# Patient Record
Sex: MALE | Race: WHITE | NOT HISPANIC OR LATINO | Employment: UNEMPLOYED | URBAN - METROPOLITAN AREA
[De-identification: names, ages, dates, MRNs, and addresses within clinical notes are randomized per-mention and may not be internally consistent; named-entity substitution may affect disease eponyms.]

---

## 2018-01-05 ENCOUNTER — COMMUNICATION - HEALTHEAST (OUTPATIENT)
Dept: SCHEDULING | Facility: CLINIC | Age: 33
End: 2018-01-05

## 2019-04-10 ENCOUNTER — OFFICE VISIT - HEALTHEAST (OUTPATIENT)
Dept: FAMILY MEDICINE | Facility: CLINIC | Age: 34
End: 2019-04-10

## 2019-04-10 DIAGNOSIS — I10 HYPERTENSION, UNSPECIFIED TYPE: ICD-10-CM

## 2019-04-10 DIAGNOSIS — Z01.818 PREOPERATIVE EXAMINATION: ICD-10-CM

## 2019-04-10 DIAGNOSIS — G47.33 OSA (OBSTRUCTIVE SLEEP APNEA): ICD-10-CM

## 2019-04-10 DIAGNOSIS — Z87.828 HISTORY OF LACERATION OF SKIN: ICD-10-CM

## 2019-04-10 LAB
ANION GAP SERPL CALCULATED.3IONS-SCNC: 10 MMOL/L (ref 5–18)
BUN SERPL-MCNC: 12 MG/DL (ref 8–22)
CALCIUM SERPL-MCNC: 9.9 MG/DL (ref 8.5–10.5)
CHLORIDE BLD-SCNC: 105 MMOL/L (ref 98–107)
CO2 SERPL-SCNC: 24 MMOL/L (ref 22–31)
CREAT SERPL-MCNC: 0.71 MG/DL (ref 0.7–1.3)
ERYTHROCYTE [DISTWIDTH] IN BLOOD BY AUTOMATED COUNT: 11 % (ref 11–14.5)
GFR SERPL CREATININE-BSD FRML MDRD: >60 ML/MIN/1.73M2
GLUCOSE BLD-MCNC: 91 MG/DL (ref 70–125)
HCT VFR BLD AUTO: 44.7 % (ref 40–54)
HGB BLD-MCNC: 14.8 G/DL (ref 14–18)
MCH RBC QN AUTO: 31.5 PG (ref 27–34)
MCHC RBC AUTO-ENTMCNC: 33.1 G/DL (ref 32–36)
MCV RBC AUTO: 95 FL (ref 80–100)
PLATELET # BLD AUTO: 293 THOU/UL (ref 140–440)
PMV BLD AUTO: 9.2 FL (ref 7–10)
POTASSIUM BLD-SCNC: 4.7 MMOL/L (ref 3.5–5)
RBC # BLD AUTO: 4.71 MILL/UL (ref 4.4–6.2)
SODIUM SERPL-SCNC: 139 MMOL/L (ref 136–145)
WBC: 7.4 THOU/UL (ref 4–11)

## 2019-04-11 ENCOUNTER — OFFICE VISIT - HEALTHEAST (OUTPATIENT)
Dept: SURGERY | Facility: CLINIC | Age: 34
End: 2019-04-11

## 2019-04-11 ENCOUNTER — ANESTHESIA - HEALTHEAST (OUTPATIENT)
Dept: SURGERY | Facility: CLINIC | Age: 34
End: 2019-04-11

## 2019-04-11 DIAGNOSIS — S31.802A: ICD-10-CM

## 2019-04-11 ASSESSMENT — MIFFLIN-ST. JEOR: SCORE: 2642.7

## 2019-04-12 ENCOUNTER — SURGERY - HEALTHEAST (OUTPATIENT)
Dept: SURGERY | Facility: CLINIC | Age: 34
End: 2019-04-12

## 2019-04-12 ASSESSMENT — MIFFLIN-ST. JEOR: SCORE: 2640.43

## 2019-04-18 ENCOUNTER — OFFICE VISIT - HEALTHEAST (OUTPATIENT)
Dept: SURGERY | Facility: CLINIC | Age: 34
End: 2019-04-18

## 2019-04-18 DIAGNOSIS — S31.811A LACERATION OF RIGHT BUTTOCK, INITIAL ENCOUNTER: ICD-10-CM

## 2019-06-05 ENCOUNTER — OFFICE VISIT - HEALTHEAST (OUTPATIENT)
Dept: FAMILY MEDICINE | Facility: CLINIC | Age: 34
End: 2019-06-05

## 2019-06-05 DIAGNOSIS — I15.9 SECONDARY HYPERTENSION: ICD-10-CM

## 2019-06-05 DIAGNOSIS — S31.811A LACERATION OF RIGHT BUTTOCK, INITIAL ENCOUNTER: ICD-10-CM

## 2020-01-21 ENCOUNTER — OFFICE VISIT - HEALTHEAST (OUTPATIENT)
Dept: FAMILY MEDICINE | Facility: CLINIC | Age: 35
End: 2020-01-21

## 2020-01-21 DIAGNOSIS — M25.562 ACUTE PAIN OF LEFT KNEE: ICD-10-CM

## 2020-03-16 ENCOUNTER — OFFICE VISIT - HEALTHEAST (OUTPATIENT)
Dept: FAMILY MEDICINE | Facility: CLINIC | Age: 35
End: 2020-03-16

## 2020-03-16 DIAGNOSIS — G47.33 OSA (OBSTRUCTIVE SLEEP APNEA): ICD-10-CM

## 2020-03-16 DIAGNOSIS — I15.9 SECONDARY HYPERTENSION: ICD-10-CM

## 2020-03-16 RX ORDER — HYDROCHLOROTHIAZIDE 25 MG/1
25 TABLET ORAL DAILY
Qty: 90 TABLET | Refills: 1 | Status: SHIPPED | OUTPATIENT
Start: 2020-03-16 | End: 2022-04-18

## 2020-07-31 ENCOUNTER — OFFICE VISIT - HEALTHEAST (OUTPATIENT)
Dept: FAMILY MEDICINE | Facility: CLINIC | Age: 35
End: 2020-07-31

## 2020-07-31 DIAGNOSIS — R31.0 GROSS HEMATURIA: ICD-10-CM

## 2020-07-31 DIAGNOSIS — R10.9 FLANK PAIN: ICD-10-CM

## 2020-07-31 DIAGNOSIS — I10 BENIGN ESSENTIAL HYPERTENSION: ICD-10-CM

## 2020-07-31 DIAGNOSIS — R00.0 TACHYCARDIA: ICD-10-CM

## 2020-07-31 DIAGNOSIS — R11.11 VOMITING WITHOUT NAUSEA, INTRACTABILITY OF VOMITING NOT SPECIFIED, UNSPECIFIED VOMITING TYPE: ICD-10-CM

## 2020-07-31 DIAGNOSIS — R35.0 URINE FREQUENCY: ICD-10-CM

## 2020-07-31 LAB
ALBUMIN UR-MCNC: ABNORMAL MG/DL
APPEARANCE UR: CLEAR
BILIRUB UR QL STRIP: NEGATIVE
COLOR UR AUTO: YELLOW
GLUCOSE UR STRIP-MCNC: NEGATIVE MG/DL
HGB UR QL STRIP: NEGATIVE
KETONES UR STRIP-MCNC: NEGATIVE MG/DL
LEUKOCYTE ESTERASE UR QL STRIP: NEGATIVE
NITRATE UR QL: NEGATIVE
PH UR STRIP: 7 [PH] (ref 5–8)
SP GR UR STRIP: 1.02 (ref 1–1.03)
UROBILINOGEN UR STRIP-ACNC: ABNORMAL

## 2020-07-31 ASSESSMENT — MIFFLIN-ST. JEOR: SCORE: 2547.45

## 2020-08-02 ENCOUNTER — COMMUNICATION - HEALTHEAST (OUTPATIENT)
Dept: SCHEDULING | Facility: CLINIC | Age: 35
End: 2020-08-02

## 2021-05-27 ENCOUNTER — RECORDS - HEALTHEAST (OUTPATIENT)
Dept: ADMINISTRATIVE | Facility: CLINIC | Age: 36
End: 2021-05-27

## 2021-05-27 NOTE — ANESTHESIA CARE TRANSFER NOTE
Last vitals:   Vitals:    04/12/19 0837   BP: (!) 197/105   Pulse: 100   Resp: 22   Temp:    SpO2: 99%     Patient's level of consciousness is drowsy  Spontaneous respirations: yes  Maintains airway independently: yes  Dentition unchanged: yes  Oropharynx: oropharynx clear of all foreign objects    QCDR Measures:  ASA# 20 - Surgical Safety Checklist: WHO surgical safety checklist completed prior to induction    PQRS# 430 - Adult PONV Prevention: 4558F - Pt received => 2 anti-emetic agents (different classes) preop & intraop  ASA# 8 - Peds PONV Prevention: NA - Not pediatric patient, not GA or 2 or more risk factors NOT present  PQRS# 424 - Nina-op Temp Management: 4559F - At least one body temp DOCUMENTED => 35.5C or 95.9F within required timeframe  PQRS# 426 - PACU Transfer Protocol: - Transfer of care checklist used  ASA# 14 - Acute Post-op Pain: ASA14B - Patient did NOT experience pain >= 7 out of 10

## 2021-05-27 NOTE — ANESTHESIA POSTPROCEDURE EVALUATION
Patient: Dk Akins  IRRIGATION AND DEBRIDEMENT, TORSO, PERIANAL WOUND  Anesthesia type: general    Patient location: PACU  Last vitals:   Vitals Value Taken Time   /77 4/12/2019 10:30 AM   Temp 36.9  C (98.4  F) 4/12/2019 10:40 AM   Pulse 82 4/12/2019 10:43 AM   Resp 18 4/12/2019 10:40 AM   SpO2 95 % 4/12/2019 10:43 AM   Vitals shown include unvalidated device data.  Post vital signs: stable  Level of consciousness: awake and responds to simple questions  Post-anesthesia pain: pain controlled  Post-anesthesia nausea and vomiting: no  Pulmonary: unassisted, return to baseline  Cardiovascular: stable and blood pressure at baseline  Hydration: adequate  Anesthetic events: no    QCDR Measures:  ASA# 11 - Nina-op Cardiac Arrest: ASA11B - Patient did NOT experience unanticipated cardiac arrest  ASA# 12 - Nina-op Mortality Rate: ASA12B - Patient did NOT die  ASA# 13 - PACU Re-Intubation Rate: ASA13B - Patient did NOT require a new airway mgmt  ASA# 10 - Composite Anes Safety: ASA10A - No serious adverse event    Additional Notes:

## 2021-05-27 NOTE — PROGRESS NOTES
Preoperative Exam    Scheduled Procedure: debridement R side buttocks  Surgery Date: Unspecified  Surgery Location: [facility]  Surgeon: Unspecified    33-year-old male with a past medical history of obstructive sleep apnea, and hypertension as well as obesity presents today for acute onset laceration.  He was seen in the emergency department this weekend after he accidentally sat on a betts's hook and sustained a laceration to his right buttocks.  He was sent to a different hospital ostensibly for insurance reasons and unfortunately showed up in this clinic requesting guidance.    He has a history of hypertension but for reasons that are not clear he is neglected to take his medication for the past month or so.  He has been on hydrochlorothiazide 25 mg daily.    The patient has had multiple past orthopedic surgeries without complication, he has high risk for sleep apnea, family history is negative for malignant hyperthermia, he is not on any blood thinners, and he is a Mallampati class III.    Assessment/Plan:     1. History of laceration of skin  Patient has a laceration which was inspected and can be repaired and debrided by general surgery.  Referral made.  - Ambulatory referral to General Surgery    2. Preoperative examination  Patient is been optimized for surgery  - HM2(CBC w/o Differential)  - Basic Metabolic Panel    3. LATOYA (obstructive sleep apnea)  Close monitoring postoperatively for sleep apnea    4. Hypertension, unspecified type  Patient's blood pressure elevated.  Unfortunately he has not been taking his antihypertensives and I encouraged him to get back on his hydrochlorothiazide.  Close monitoring of blood pressure pre-and postoperatively recommended.    Surgical Procedure Risk: Low (reported cardiac risk generally < 1%)  Have you had prior anesthesia?: Yes  Have you or any family members had a previous anesthesia reaction:  No  Do you or any family members have a history of a clotting or  bleeding disorder?: Yes: hx of DVT on shin  Cardiac Risk Assessment: no increased risk for major cardiac complications    Pt has been optimized for surgery      Functional Status: Independent  Patient plans to recover at home with family.     Subjective:      Dk Akins is a 33 y.o. male who presents for a preoperative consultation.      All other systems reviewed and are negative, other than those listed in the HPI.    Pertinent History  Do you have difficulty breathing or chest pain after walking up a flight of stairs: No  History of obstructive sleep apnea: Yes: undiagnosed  Steroid use in the last 6 months: No  Frequent Aspirin/NSAID use: No  Prior Blood Transfusion: No  Prior Blood Transfusion Reaction: No  If for some reason prior to, during or after the procedure, if it is medically indicated, would you be willing to have a blood transfusion?:  There is no transfusion refusal.    Current Outpatient Medications   Medication Sig Dispense Refill     amoxicillin-clavulanate (AUGMENTIN) 875-125 mg per tablet Take 1 tablet by mouth.       No current facility-administered medications for this visit.         No Known Allergies    Patient Active Problem List   Diagnosis     Closed Fracture Of The Patella     Bone Cyst       Past Medical History:   Diagnosis Date     Anxiety      Femur fracture (H)     x4       Past Surgical History:   Procedure Laterality Date     KNEE SURGERY       [place] OPEN TREATMENT BIMALLEOLAR ANKLE FRACTURE      Description: Open Treatment Of Bimalleolar Ankle Fracture;  Recorded: 09/27/2012;       Social History     Socioeconomic History     Marital status: Single     Spouse name: Not on file     Number of children: Not on file     Years of education: Not on file     Highest education level: Not on file   Occupational History     Not on file   Social Needs     Financial resource strain: Not on file     Food insecurity:     Worry: Not on file     Inability: Not on file     Transportation  needs:     Medical: Not on file     Non-medical: Not on file   Tobacco Use     Smoking status: Light Tobacco Smoker     Smokeless tobacco: Never Used   Substance and Sexual Activity     Alcohol use: Not on file     Drug use: Not on file     Sexual activity: Not on file   Lifestyle     Physical activity:     Days per week: Not on file     Minutes per session: Not on file     Stress: Not on file   Relationships     Social connections:     Talks on phone: Not on file     Gets together: Not on file     Attends Voodoo service: Not on file     Active member of club or organization: Not on file     Attends meetings of clubs or organizations: Not on file     Relationship status: Not on file     Intimate partner violence:     Fear of current or ex partner: Not on file     Emotionally abused: Not on file     Physically abused: Not on file     Forced sexual activity: Not on file   Other Topics Concern     Not on file   Social History Narrative     Not on file       ROS:  10 pt system review complete.  Notable for hypertension obesity              Objective:     Vitals:    04/10/19 1352   BP: (!) 160/100   Pulse: 95   Resp: 16   SpO2: 97%   Weight: (!) 372 lb (168.7 kg)         Physical Exam:  Constitutional:    --Vitals as above  --No acute distress  Eyes-  --Sclera noninjected  --Lids and conjunctiva normal  ENT-  --TMs clear  --Sclera noninjected  --Pharynx not erythematous  Neck-  --Neck supple with no cervical lymphadenopathy  Lungs-  --Clear to Auscultation  Heart-  --Regular rate and rhythm  Abdomen--  --Soft, non-tender, non-distended but obese  Skin-  --Pink and dry except for the right buttock has a approximately 10 cm laceration on the medial aspect of the right buttock  Psych-  --Alert and oriented  --Normal affect      There are no Patient Instructions on file for this visit.        Labs:  Labs pending at this time.  Results will be reviewed when available.    Immunization History   Administered Date(s)  Administered     DT (pediatric) 01/01/1999     Hep B, historic 01/21/1999, 03/22/1999     Influenza, seasonal,quad inj 36+ mos 09/30/2016     Td,adult,historic,unspecified 01/01/1999     Tdap 02/07/2012       Thank you for the opportunity to participate in the care for this patient.  If you have any concerns please do not hesitate to contact me at the [facility] at [phone].    Electronically signed by Usha Garcia MD 04/10/19 1:54 PM

## 2021-05-27 NOTE — ANESTHESIA PREPROCEDURE EVALUATION
Anesthesia Evaluation      Patient summary reviewed     Airway   Mallampati: III  Neck ROM: limited  Comment: Very large neck diameter and beard   Pulmonary - normal exam                          Cardiovascular   Exercise tolerance: > or = 4 METS  (+) hypertension, ,     Rhythm: regular  Rate: normal,         Neuro/Psych    (+) depression, anxiety/panic attacks,     Endo/Other    (+) obesity (BMI 49.7),      GI/Hepatic/Renal       Other findings: Obese habitus  eyeglasses      Dental - normal exam                        Anesthesia Plan  Planned anesthetic: general endotracheal  Gildescope, discussed the possibility of awake fiberoptic intubation  Patient likely has undiagnosed sleep apnea.  Will monitor accordingly  ASA 3   Induction: intravenous   Anesthetic plan and risks discussed with: patient and spouse  Anesthesia plan special considerations: antiemetics,   Post-op plan: routine recovery

## 2021-05-27 NOTE — PROGRESS NOTES
HPI: Dk Akins is a 33 y.o. male referred to see me by Usha Garcia MD for duration of a perianal laceration..  He notes sustaining a laceration to his right buttock 4 days ago, when he accidentally sat on a betts's hook.  He initially presented to urgent care, where after inspection he was referred to our colleagues at [facility] were unable to help him due to insurance reasons.  He was then seen by Dr. Garcia yesterday, who felt this wound needed debridement and refer him on to us.  Notes still having a fair amount of pain, no difficulty with defecation, no incontinence.    Allergies:Patient has no known allergies.    Past Medical History:   Diagnosis Date     Anxiety      Femur fracture (H)     x4       Past Surgical History:   Procedure Laterality Date     KNEE SURGERY       [place] OPEN TREATMENT BIMALLEOLAR ANKLE FRACTURE      Description: Open Treatment Of Bimalleolar Ankle Fracture;  Recorded: 09/27/2012;       CURRENT MEDS:    Current Outpatient Medications:      amoxicillin-clavulanate (AUGMENTIN) 875-125 mg per tablet, Take 1 tablet by mouth., Disp: , Rfl:      hydroCHLOROthiazide (HYDRODIURIL) 25 MG tablet, Take 25 mg by mouth daily., Disp: , Rfl:     Family History   Problem Relation Age of Onset     Mental retardation Brother      Breast cancer Maternal Grandmother      Coronary artery disease Paternal Grandfather         reports that he has been smoking.  He has never used smokeless tobacco.    Review of Systems:  The 10 point review of systems  is within normal limits except for as mentioned above in the HPI.  General ROS: No complaints or constitutional symptoms  Skin: As noted in HPI  Hematologic/Lymphatic: No symptoms or complaints  Psychiatric: No symptoms or complaints  Endocrine: No excessive fatigue, no hypermetabolic symptoms reported  Respiratory ROS: no cough, shortness of breath, or wheezing  Cardiovascular ROS: no chest pain or dyspnea on exertion  Gastrointestinal  ROS: GI complaints or concerns  Musculoskeletal ROS: no recent injuries reported  Neurological ROS: no focal neurologic defects reported.        BP (!) 140/94 (Patient Site: Right Arm, Patient Position: Sitting, Cuff Size: Adult Large)   Pulse (!) 107   Ht 6' (1.829 m)   Wt (!) 367 lb (166.5 kg)   SpO2 97%   BMI 49.77 kg/m    Body mass index is 49.77 kg/m .    EXAM:  General : Alert, cooperative, appears stated age   Skin: 8-10 cm laceration at the 3 o'clock position examined the patient in prone jackknife, with some fibrinous exudate and dead skin tissue at the medial aspect.  No purulence noted.  Lymphatic: No obvious adenopathy, no swelling   Eyes: No scleral icterus, pupils equal  HENT: no traumatic injury to the head or face, no gross abnormalities  Lungs: Normal respiratory effort  Heart: Regular rate and rhythm  Musculoskeletal: No obvious swelling  Neurologic: Grossly intact      LABS:  Lab Results   Component Value Date    WBC 7.4 04/10/2019    HGB 14.8 04/10/2019    HCT 44.7 04/10/2019    MCV 95 04/10/2019     04/10/2019     INR/Prothrombin Time  Results from last 7 days   Lab Units 04/10/19  1428   LN-SODIUM mmol/L 139   LN-POTASSIUM mmol/L 4.7   LN-CHLORIDE mmol/L 105   LN-CO2 mmol/L 24   LN-BLOOD UREA NITROGEN mg/dL 12   LN-CREATININE mg/dL 0.71   LN-CALCIUM mg/dL 9.9     Lab Results   Component Value Date    ALT 56 04/04/2011    AST 23 04/04/2011    ALKPHOS 60 04/04/2011    BILITOT 1.23 (H) 04/04/2011     Assessment/Plan:   1. Laceration of buttock with foreign body, unspecified laterality, initial encounter        Dk Akins is a 33 y.o. male with a laceration now 4 days old of the skin involving the perianal tissue, but not involving the sphincter complex.  We will plan on performing debridement under monitored anesthetic care tomorrow.  Discussed that we will not be primarily closing the wound due to bacterial colonization in the area, but will try to reapproximate it to allow for  quicker healing.    Maxi Rolle MD  [phone]  [facility] Department of Surgery

## 2021-05-29 NOTE — PROGRESS NOTES
Chief Complaint   Patient presents with     Wound Check     Pt c/o wound from April has reopened       HPI: 33-year-old male presents today with 2 concerns.  The first is a right buttock wound.  He sat on a betts's hook approximately 1 month ago, had surgery, and was using wet-to-dry dressings and noted that the wound was completely healed.  In fact, he showed me a photo of the wound being completely healed and closed.  Unfortunately he was fishing over the weekend, and was leaning against a fishing seat and opened up the wound.  Since then has been having clear drainage.  He has had no bowel movement issues.  No blood.    He was also concerned about hypertension.  He is currently on hydrochlorothiazide from another provider, and his blood pressure has been elevated in the past.    ROS: No chest pain or shortness of breath.  No blood from the wound.  No bowel or bladder difficulties.    SH:    reports that he has been smoking.  He has never used smokeless tobacco. He reports that he drinks about 1.2 oz of alcohol per week.      FH: The Patient's family history includes Breast cancer in his maternal grandmother; Coronary artery disease in his paternal grandfather; Mental retardation in his brother.     Meds:  Dk has a current medication list which includes the following prescription(s): docusate sodium, hydrochlorothiazide, and ibuprofen.    O:  /80   Pulse 76   Temp 99  F (37.2  C)   Resp 16   Wt (!) 373 lb (169.2 kg)   SpO2 96%   BMI 50.59 kg/m    Examination of the buttocks show a 1 x 3 cm oval laceration in the right gluteal region medial aspect that has epithelization underneath with open wound edges of approximately 1 cm.    A/P:   1. Laceration of right buttock, initial encounter  The wound appears to be clean, and no evidence of infection.  I think that allowing it to heal from within as opposed to closure is the appropriate choice as was the choice of surgery.  Encourage wet-to-dry  dressings and follow-up in 4 weeks.  Wife is very attentive and aware of wound dressing techniques.  He will let me know if wound gets worse in any way.    2. Secondary hypertension  Blood pressure elevated and I encouraged him to continue the hydrochlorothiazide.  Encouraged him to give me serial blood pressures and if blood pressures are elevated at his next visit, in 4 weeks, would consider further medications.  Encouraged exercise weight loss and healthy diet.

## 2021-06-02 ENCOUNTER — RECORDS - HEALTHEAST (OUTPATIENT)
Dept: ADMINISTRATIVE | Facility: CLINIC | Age: 36
End: 2021-06-02

## 2021-06-03 VITALS — WEIGHT: 315 LBS | BODY MASS INDEX: 48.96 KG/M2

## 2021-06-03 VITALS — WEIGHT: 315 LBS | BODY MASS INDEX: 50.59 KG/M2

## 2021-06-03 VITALS — BODY MASS INDEX: 42.66 KG/M2 | HEIGHT: 72 IN | WEIGHT: 315 LBS

## 2021-06-03 VITALS — BODY MASS INDEX: 50.45 KG/M2 | WEIGHT: 315 LBS

## 2021-06-04 VITALS
SYSTOLIC BLOOD PRESSURE: 150 MMHG | RESPIRATION RATE: 16 BRPM | HEART RATE: 102 BPM | BODY MASS INDEX: 49.37 KG/M2 | DIASTOLIC BLOOD PRESSURE: 90 MMHG | OXYGEN SATURATION: 96 % | WEIGHT: 315 LBS

## 2021-06-04 VITALS
DIASTOLIC BLOOD PRESSURE: 106 MMHG | SYSTOLIC BLOOD PRESSURE: 160 MMHG | WEIGHT: 315 LBS | BODY MASS INDEX: 42.66 KG/M2 | HEART RATE: 125 BPM | OXYGEN SATURATION: 97 % | HEIGHT: 72 IN

## 2021-06-04 VITALS
WEIGHT: 315 LBS | OXYGEN SATURATION: 97 % | SYSTOLIC BLOOD PRESSURE: 158 MMHG | DIASTOLIC BLOOD PRESSURE: 90 MMHG | BODY MASS INDEX: 47.88 KG/M2 | RESPIRATION RATE: 16 BRPM | HEART RATE: 97 BPM

## 2021-06-05 NOTE — PROGRESS NOTES
Chief Complaint   Patient presents with     Knee Pain     Pt c/o slipping on the ice and hurt his L knee 2 weeks ago. He also pulled off his boots and fell landed on L knee.        HPI: Complicated 34-year-old male with multiple past orthopedic injuries including fractured patella, wound dehiscence and hypertension, presents today with left knee pain for 1 month in duration.  He notes that he slipped on the ice and had a pivoting motion which caused pain.  He cannot tell me whether not it was swollen.  Now hurts him to ambulate particularly up and down stairs.    ROS: No distal paresthesias.  No swelling.    SH:    reports that he has been smoking. He has never used smokeless tobacco. He reports current alcohol use of about 2.0 standard drinks of alcohol per week.      FH: The Patient's family history includes Breast cancer in his maternal grandmother; Coronary artery disease in his paternal grandfather; Mental retardation in his brother.     Meds: Dk has a current medication list which includes the following prescription(s): docusate sodium, hydrochlorothiazide, and ibuprofen.    O:  /90   Pulse 97   Resp 16   Wt (!) 353 lb (160.1 kg)   SpO2 97%   BMI 47.88 kg/m       Constitutional:    --Vitals as above  --No acute distress  Eyes-  --Sclera noninjected  --Lids and conjunctiva normal  Musculoskeletal-left knee is examined and shows patella freely movable, there is no mediolateral or posterior joint space tenderness, normal flexion extension, positive Josue sign, negative Lockman sign.  Skin-  --Finderne and dry    A/P:   1. Acute pain of left knee  Most likely represents meniscal tear.  Offered MRI and imaging.  Patient will be referred to orthopedics since he has had a relationship with some orthopedics in the past.  - Ambulatory referral to Orthopedics

## 2021-06-06 NOTE — PROGRESS NOTES
Chief Complaint   Patient presents with     Snoring     Pt would like referral to sleep clinic       HPI: 34-year-old male with history of obesity, hypertension presents today with concerns about sleep apnea.  His wife tells him that he snores, and that is the only evidence that he has.  He sleeps well, feels rested and has no concerns personally.    He continues to have hypertension has not taken his hydrochlorothiazide.    His knee is improving after surgery.    ROS: No sleepiness.  No falling asleep in the middle of the day.  No difficulty breathing at night.    SH: Reviewed-see Snapshot for review.     FH: Reviewed-see Snapshot for review.    Meds:  Dk has a current medication list which includes the following prescription(s): docusate sodium, hydrochlorothiazide, and ibuprofen.    O:  /90   Pulse (!) 102   Resp 16   Wt (!) 364 lb (165.1 kg)   SpO2 96%   BMI 49.37 kg/m    Constitutional:    --Vitals as above  --No acute distress  Eyes-  --Sclera noninjected  --Lids and conjunctiva normal  --Pink and dry    A/P:   1. LATOYA (obstructive sleep apnea)  The patient has no symptoms of sleep apnea but is told he snores.  Will refer to sleep medicine.  - Ambulatory referral to Sleep Medicine    2. Secondary hypertension  Restart hydrochlorothiazide monitor blood pressure  - hydroCHLOROthiazide (HYDRODIURIL) 25 MG tablet; Take 1 tablet (25 mg total) by mouth daily.  Dispense: 90 tablet; Refill: 1    3.  Obesity  -Patient is aware he needs to lose weight.

## 2021-06-10 NOTE — PROGRESS NOTES
Chief Complaint   Patient presents with     Back Pain     Started off with low left side now last 2 days has been low right side. Pain wraps around to the front.      Urine Frequency     States that he has had increased frequency but still able to go.        HPI: Patient presents today with 2 days of worsening right flank pain and also severe left lower back pain as well.  This is in the presence of urinary frequency.  When asked about hematuria, the patient says that last night he cannot remember if he directed or if it actually happened, but he has a foggy vision of him having a significant amount of blood in the toilet bowl.  No dysuria.  He has been vomiting the pain is so bad.  He intermittently will feel hot and cold.  No constipation or diarrhea but he admits the pain gets so bad at times that he will vomit.  No personal history of renal colic but he thinks his dad might of had a kidney stone.  Specific concerns for STDs.    Blood pressure today is elevated.  Patient says that this is nothing new.  No chest pain palpitations, lightheadedness, dizziness.      ROS:Review of Systems - negative except for what's listed in the HPI    SH: The Patient's  reports that he has been smoking. He has never used smokeless tobacco. He reports current alcohol use of about 2.0 standard drinks of alcohol per week.      FH: The Patient's family history includes Breast cancer in his maternal grandmother; Coronary artery disease in his paternal grandfather; Mental retardation in his brother.     Meds:    Current Outpatient Medications on File Prior to Visit   Medication Sig Dispense Refill     docusate sodium (COLACE) 100 MG capsule Take 100 mg by mouth daily.       hydroCHLOROthiazide (HYDRODIURIL) 25 MG tablet Take 1 tablet (25 mg total) by mouth daily. 90 tablet 1     ibuprofen (ADVIL,MOTRIN) 200 MG tablet Take 800 mg by mouth every 6 (six) hours as needed for pain.       No current facility-administered medications on file  prior to visit.        O:  BP (!) 160/106   Pulse (!) 125   Ht 6' (1.829 m)   Wt (!) 346 lb (156.9 kg)   SpO2 97%   BMI 46.93 kg/m      Physical Examination:   General appearance -alert.  Appears in significant distress.  Hunched over.  He has vomited into a trash can.  Mental status - alert, oriented to person, place, and time  Mouth - mucous membranes moist  Neck - no significant adenopathy  Lymphatics - no palpable lymphadenopathy, no hepatosplenomegaly  Chest - clear to auscultation, no wheezes, rales or rhonchi, symmetric air entry  Heart - normal rate and regular rhythm, S1 and S2 normal, no murmurs noted  Abdomen -mild discomfort with palpation along the right and left abdomen.  Musculoskeletal-hunched over.  Slow sit to stand.  No significant CVA tenderness.  Extremities peripheral pulses normal  Skin - normal coloration and turgor.      A/P:     Problem List Items Addressed This Visit     None      Visit Diagnoses     Flank pain    -  Primary    Urine frequency        Relevant Orders    Urinalysis Macroscopic (Completed)    Gross hematuria        Tachycardia        Vomiting without nausea, intractability of vomiting not specified, unspecified vomiting type        Benign essential hypertension            Patient appears extremely uncomfortable.  Given the vital signs and potential for infectious origin, we have to think of sepsis as well.  Likelihood of kidney stone is high with possible hydronephrosis.  Urine is surprisingly normal.  Potential also for back issues.  Discussed with the patient that we should get evaluation in the emergency department but he declines.  He would like to try to get a CT scan done outpatient.  This needs to be done stat.    Addendum:    Reentering the room with the ketorolac injection for the patient, he has changed his mind and is willing to go to the emergency department.  Report called to [facility] ED.    1. Flank pain    2. Urine frequency  - Urinalysis  Macroscopic    3. Gross hematuria    4. Tachycardia    5. Vomiting without nausea, intractability of vomiting not specified, unspecified vomiting type    6. Benign essential hypertension        Kevin Parada, CNP

## 2021-06-10 NOTE — PATIENT INSTRUCTIONS - HE
Toradol injection given. No advil or ibuprofen for the next 24 hours.    CT to rule out a stone.    I'll call your cell phone when the results are read by radiology and will leave a message if I cant reach you.    If the pain is too uncontrolled, get checked out in the ER..

## 2021-06-16 PROBLEM — T81.33XA TRAUMATIC WOUND DEHISCENCE: Status: ACTIVE | Noted: 2019-04-11

## 2021-06-19 NOTE — LETTER
Letter by Maxi Rolle MD at      Author: Maxi Rolle MD Service: -- Author Type: --    Filed:  Encounter Date: 4/18/2019 Status: (Other)         April 18, 2019     Patient: Dk Akins   YOB: 1985   Date of Visit: 4/18/2019       To Whom It May Concern:    It is my medical opinion that Dk Akins should remain out of work until 4/29/2019 due to an injury resulting in an open wound.  Due to its location, frequent physical activity poses a risk to its healing.  .    If you have any questions or concerns, please don't hesitate to call.    Sincerely,        Electronically signed by Maxi Rolle MD

## 2021-06-27 ENCOUNTER — HEALTH MAINTENANCE LETTER (OUTPATIENT)
Age: 36
End: 2021-06-27

## 2021-10-17 ENCOUNTER — HEALTH MAINTENANCE LETTER (OUTPATIENT)
Age: 36
End: 2021-10-17

## 2022-04-18 ENCOUNTER — OFFICE VISIT (OUTPATIENT)
Dept: FAMILY MEDICINE | Facility: CLINIC | Age: 37
End: 2022-04-18
Payer: COMMERCIAL

## 2022-04-18 VITALS
HEART RATE: 88 BPM | DIASTOLIC BLOOD PRESSURE: 82 MMHG | OXYGEN SATURATION: 97 % | SYSTOLIC BLOOD PRESSURE: 128 MMHG | BODY MASS INDEX: 42.04 KG/M2 | WEIGHT: 310 LBS

## 2022-04-18 DIAGNOSIS — E66.01 MORBID OBESITY (H): ICD-10-CM

## 2022-04-18 DIAGNOSIS — Z09 HOSPITAL DISCHARGE FOLLOW-UP: Primary | ICD-10-CM

## 2022-04-18 DIAGNOSIS — Z91.89 AT RISK FOR NARCOLEPSY: ICD-10-CM

## 2022-04-18 DIAGNOSIS — I15.9 SECONDARY HYPERTENSION: ICD-10-CM

## 2022-04-18 DIAGNOSIS — G47.33 OSA (OBSTRUCTIVE SLEEP APNEA): ICD-10-CM

## 2022-04-18 PROCEDURE — 99214 OFFICE O/P EST MOD 30 MIN: CPT | Performed by: FAMILY MEDICINE

## 2022-04-18 RX ORDER — HYDROCHLOROTHIAZIDE 25 MG/1
25 TABLET ORAL
COMMUNITY
Start: 2020-03-16 | End: 2022-05-02 | Stop reason: DRUGHIGH

## 2022-04-18 NOTE — PROGRESS NOTES
ANTICOAGULATION FOLLOW-UP CLINIC VISIT    Patient Name:  Angel Garrett  Date:  2021  Contact Type:  Face to Face    SUBJECTIVE:  Patient Findings         Clinical Outcomes     Negatives:  Major bleeding event, Thromboembolic event, Anticoagulation-related hospital admission, Anticoagulation-related ED visit, Anticoagulation-related fatality           OBJECTIVE    Recent labs: (last 7 days)     21  1515   INR 2.9*       ASSESSMENT / PLAN  INR assessment THER    Recheck INR In: 6 WEEKS    INR Location Clinic      Anticoagulation Summary  As of 2021    INR goal:  2.0-3.0   TTR:  86.1 % (10.1 mo)   INR used for dosin.9 (2021)   Warfarin maintenance plan:  9 mg (3 mg x 3) every Sat; 6 mg (3 mg x 2) all other days   Full warfarin instructions:  9 mg every Sat; 6 mg all other days   Weekly warfarin total:  45 mg   No change documented:  Taniya Christiansen RN   Plan last modified:  Saba Honeycutt RN (2020)   Next INR check:  2021   Priority:  Maintenance   Target end date:  Indefinite    Indications    Chronic atrial fibrillation (H) [I48.20]  Anticoagulation monitoring  INR range 2-3 [Z79.01]             Anticoagulation Episode Summary     INR check location:  Anticoagulation Clinic    Preferred lab:  [facility]    Send INR reminders to:  [facility]    Comments:        Anticoagulation Care Providers     Provider Role Specialty Phone number    Donte Mckeon MD Referring Family Medicine [phone]            See the Encounter Report to view Anticoagulation Flowsheet and Dosing Calendar (Go to Encounters tab in chart review, and find the Anticoagulation Therapy Visit)        Taniya Christiansen RN                  Assessment/plan   Dk Akins is a 36 year old male who is New  patient to my practice here with chief complaint     Patient presents with:  Establish Care: Check for sleep Apnea and follow up blood pressure        Dk was seen today for establish care.    Diagnoses and all orders for this visit:    Hospital discharge follow-up  Comments:  STOP-BANG score 6-7. CPAP/sleep test. Will follow up.    Secondary hypertension  Comments:  Currently controlled with hydrochlorothiazide.    LATOYA (obstructive sleep apnea)  Comments:  STOP-BANG score 6-7. CPAP/sleep test. Will follow up.  Orders:  -     Adult Sleep Eval & Management  Referral; Future    Morbid obesity (H)  Comments:  STOP-BANG score 6-7. CPAP/sleep test. Will follow up.    At risk for narcolepsy  Comments:  STOP-BANG score 6-7. CPAP/sleep test. Will follow up.      He will also like to establish care with us advised to make appointment in next 2 to 3 weeks for preventive visit  No fasting labs done today    Subjective:      HPI: Dk Akins, a group-home resident, is a 36 year old male here for hospital followup visit. He went to the urgent care and ER 8 days ago with a chief complaint of confusion with increased nausea, vomiting, diarrhea, and warm feeling over the upper body including the face over the past week. This might have been a stomach flu. It might have caused dehydration as reflected in his lab and decompensation in the context of severe sleep deprivation from LATOYA +/- narcolepsy.    HISTORY: Last Saturday, he came home looking confused but reports he remembered everything up to that point. He drove to the gas station shortly after he came home and apparently fell asleep. However, he doesn't remember spending time with his daughter and how he drove to the gas station or how he fell asleep. He was found by his concerned wife as it was the gas station he usually goes to.     He admits to drinking alcohol but the last drink was  over 3 weeks ago. Has had an eye exam done in the last 6 months. He would like to establish care at this clinic. Recommended scheduling a preventive visit at his convenience in the next two weeks or so after completing the sleep study. Scored 6-7 points on STOP-BANG.    Hospital Follow-up Visit:    Hospital/Nursing Home/ Rehab Facility: [facility]  Date of Admission: 4/10/22  Date of Discharge: 4/10/22  Reason(s) for Admission: Confusion, Found at the gas station sleeping      Was your hospitalization related to COVID-19? No   Problems taking medications regularly:  None  Medication changes since discharge: Hydrochlorothiazide 25m  Problems adhering to non-medication therapy:  None    Summary of hospitalization:  [facility] discharge summary reviewed  Diagnostic Tests/Treatments reviewed.  Follow up needed: Sleep test  Other Healthcare Providers Involved in Patient s Care:         None  Update since discharge: improved. Post Discharge Medication Reconciliation: discharge medications reconciled, continue medications without change.  Plan of care communicated with patient            I have personally reviewed the patient's allergies, medications, past medical history, family history, social history, rooming notes and problem list in detail and updated the patient record as necessary.      Past Medical History:   Diagnosis Date     Anxiety      Anxiety      Femur fracture (H)     x4     Femur fracture (H)     x4     Humerus fracture 2000     Humerus fracture 2000     Hypertension      Hypertension      Past Surgical History:   Procedure Laterality Date     HC OPEN TX BIMALLEOLAR ANKLE FX, INCL INTERNAL FIXATION      Description: Open Treatment Of Bimalleolar Ankle Fracture;  Recorded: 09/27/2012;     KNEE SURGERY       Patient has no known allergies.  Current Outpatient Medications   Medication Sig Dispense Refill     hydrochlorothiazide (HYDRODIURIL) 25 MG tablet Take 25 mg by mouth       Family  History   Problem Relation Age of Onset     Mental retardation Brother      Breast Cancer Maternal Grandmother      Coronary Artery Disease Paternal Grandfather        Patient Active Problem List   Diagnosis     Closed Fracture Of The Patella     Bone cyst     Traumatic wound dehiscence     Laceration of right buttock, initial encounter     Secondary hypertension     Elevated blood pressure     Morbid obesity (H)       Review of Systems  Skin: negative, rash, bruising  Eyes: negative, visual blurring, double vision  Ears/Nose/Throat: negative, tinnitus, vertigo  Respiratory: No shortness of breath, dyspnea on exertion, cough, or hemoptysis  Cardiovascular: negative, exertional chest pain or pressure, paroxysmal nocturnal dyspnea, orthopnea and lower extremity edema  Gastrointestinal: negative, nausea, vomiting, melena, hematochezia, constipation and diarrhea  Genitourinary: negative, dysuria, frequency, urgency and hesitancy  Musculoskeletal: negative, joint pain and joint swelling  Neurologic: negative, numbness or tingling of hands and numbness or tingling of feet  Psychiatric: negative, excessive alcohol consumption and illegal drug usage  Hematologic/Lymphatic/Immunologic: negative, chills and fever  Endocrine: negative, cold intolerance and heat intolerance    Social History     Social History Narrative     Not on file       Objective:     Vitals:    04/18/22 1106   BP: 128/82   Pulse: 88   SpO2: 97%   Weight: 140.6 kg (310 lb)       Physical Exam:   Physical Exam:  General Appearance:  Appears comfortable, cooperative, tired/sleepy, no distress  Head: Normocephalic, without obvious abnormality, atraumatic  Eyes: PERRL, conjunctiva/corneas clear, EOM's intact, both eyes             Nose: Nares normal, no drainage   Throat: Lips, mucosa, and tongue normal; teeth and gums normal  Neck: Supple, symmetrical, trachea midline, no adenopathy;                      Lungs: Clear to auscultation bilaterally, respirations  unlabored  Heart: Regular rate and rhythm, S1 and S2 normal, no murmur, rubs or gallop  Abdomen: soft, symmetric, BS normal in 4 quadrants. No guarding or rebound tenderness  Extremities: Extremities normal, atraumatic, no cyanosis or edema  Pulses: DP pulses are 1-2+ bilat.  Skin: no rashes or lesions        25 minutes spent on the day of encounter doing chart review, history and exam, documentation, and further activities as noted.     This note has been dictated using voice recognition software. Any grammatical or context distortions are unintentional and inherent to the software    Bridgette Peace MD     There are no Patient Instructions on file for this visit.

## 2022-05-02 ENCOUNTER — OFFICE VISIT (OUTPATIENT)
Dept: FAMILY MEDICINE | Facility: CLINIC | Age: 37
End: 2022-05-02
Payer: COMMERCIAL

## 2022-05-02 VITALS
SYSTOLIC BLOOD PRESSURE: 148 MMHG | HEART RATE: 84 BPM | WEIGHT: 315 LBS | BODY MASS INDEX: 49.68 KG/M2 | DIASTOLIC BLOOD PRESSURE: 94 MMHG

## 2022-05-02 DIAGNOSIS — I15.9 SECONDARY HYPERTENSION: Primary | ICD-10-CM

## 2022-05-02 DIAGNOSIS — G47.33 OSA (OBSTRUCTIVE SLEEP APNEA): ICD-10-CM

## 2022-05-02 DIAGNOSIS — Z13.228 SCREENING FOR METABOLIC DISORDER: ICD-10-CM

## 2022-05-02 DIAGNOSIS — E66.01 MORBID OBESITY (H): ICD-10-CM

## 2022-05-02 DIAGNOSIS — Z11.59 ENCOUNTER FOR HEPATITIS C SCREENING TEST FOR LOW RISK PATIENT: ICD-10-CM

## 2022-05-02 LAB
ANION GAP SERPL CALCULATED.3IONS-SCNC: 15 MMOL/L (ref 5–18)
BUN SERPL-MCNC: 15 MG/DL (ref 8–22)
CALCIUM SERPL-MCNC: 9.9 MG/DL (ref 8.5–10.5)
CHLORIDE BLD-SCNC: 99 MMOL/L (ref 98–107)
CHOLEST SERPL-MCNC: 221 MG/DL
CO2 SERPL-SCNC: 26 MMOL/L (ref 22–31)
CREAT SERPL-MCNC: 0.8 MG/DL (ref 0.7–1.3)
FASTING STATUS PATIENT QL REPORTED: NO
GFR SERPL CREATININE-BSD FRML MDRD: >90 ML/MIN/1.73M2
GLUCOSE BLD-MCNC: 101 MG/DL (ref 70–125)
HBA1C MFR BLD: 5.3 % (ref 0–5.6)
HDLC SERPL-MCNC: 37 MG/DL
LDLC SERPL CALC-MCNC: 124 MG/DL
POTASSIUM BLD-SCNC: 4.2 MMOL/L (ref 3.5–5)
SODIUM SERPL-SCNC: 140 MMOL/L (ref 136–145)
TRIGL SERPL-MCNC: 301 MG/DL

## 2022-05-02 PROCEDURE — 80048 BASIC METABOLIC PNL TOTAL CA: CPT | Performed by: FAMILY MEDICINE

## 2022-05-02 PROCEDURE — 0054A COVID-19,PF,PFIZER (12+ YRS): CPT | Performed by: FAMILY MEDICINE

## 2022-05-02 PROCEDURE — 91305 COVID-19,PF,PFIZER (12+ YRS): CPT | Performed by: FAMILY MEDICINE

## 2022-05-02 PROCEDURE — 36415 COLL VENOUS BLD VENIPUNCTURE: CPT | Performed by: FAMILY MEDICINE

## 2022-05-02 PROCEDURE — 90715 TDAP VACCINE 7 YRS/> IM: CPT | Performed by: FAMILY MEDICINE

## 2022-05-02 PROCEDURE — 99214 OFFICE O/P EST MOD 30 MIN: CPT | Mod: 25 | Performed by: FAMILY MEDICINE

## 2022-05-02 PROCEDURE — 83036 HEMOGLOBIN GLYCOSYLATED A1C: CPT | Performed by: FAMILY MEDICINE

## 2022-05-02 PROCEDURE — 80061 LIPID PANEL: CPT | Performed by: FAMILY MEDICINE

## 2022-05-02 PROCEDURE — 86803 HEPATITIS C AB TEST: CPT | Performed by: FAMILY MEDICINE

## 2022-05-02 PROCEDURE — 90471 IMMUNIZATION ADMIN: CPT | Performed by: FAMILY MEDICINE

## 2022-05-02 RX ORDER — ATENOLOL AND CHLORTHALIDONE TABLET 50; 25 MG/1; MG/1
1 TABLET ORAL DAILY
Qty: 90 TABLET | Refills: 1 | Status: SHIPPED | OUTPATIENT
Start: 2022-05-02 | End: 2023-05-10

## 2022-05-02 NOTE — PROGRESS NOTES
Assessment/plan   Dk Akins is a 36 year old male who is  establish patient to my practice here with chief complaint     Patient presents with:  RECHECK: Follow up regarding the confusion and blood pressure, no new concerns, confusion gotten better       Dk was seen today for recheck.    Diagnoses and all orders for this visit:    Secondary hypertension  -     atenolol-chlorthalidone (TENORETIC) 50-25 MG tablet; Take 1 tablet by mouth daily  -     REVIEW OF HEALTH MAINTENANCE PROTOCOL ORDERS  -     TDAP VACCINE (Adacel, Boostrix)  -     COVID-19,PF,PFIZER (12+ YRS)  -     Basic metabolic panel  (Ca, Cl, CO2, Creat, Gluc, K, Na, BUN); Future  -     Basic metabolic panel  (Ca, Cl, CO2, Creat, Gluc, K, Na, BUN)    Morbid obesity (H)    LATOYA (obstructive sleep apnea)    Screening for metabolic disorder  -     Lipid Profile; Future  -     Hemoglobin A1c; Future  -     Lipid Profile  -     Hemoglobin A1c    Encounter for hepatitis C screening test for low risk patient  -     Hepatitis C antibody; Future  -     Hepatitis C antibody    As blood pressure is not well controlled we will change his medication to atenolol with chlorthalidone combination 1 tablet every day advised to check the blood pressure at his work and also for documentation 2 weeks follow-up at clinic by nurse visit  Work on DASH diet stable from caffeine and smoking and some form of physical activity on daily basis  Follow-up in 3-month for office visit      Subjective:      HPI: Dk Akins is a 36 year old male is here for Follow up on his blood pressure and continue to have sleep issues.  Patient not able to get into sleep study till end of June.  Continue to struggle a lot at work trying to find a new job  For some reason his blood pressure was in normal range when he came back for a first visit after the hospital discharge not sure it was the correct blood pressure as he continued to have quite elevated blood pressure even on  hydrochlorothiazide 25 mg dose high.  Patient denies any chest pain pressure shortness of breath    Sleep issues: Most likely patient has obstructive sleep apnea with narcolepsy ?  Patient does fall asleep without a problem but whenever his 2-year-old daughter wake up in the night he not able to fall back to sleep he has very hard time staying awake during the daytime and found himself in a situation where he will fall asleep and do not even remember.        Patient wt was entered wrong at last visit he was 370lb , lost 4 lb since last visit     I have personally reviewed the patient's allergies, medications, past medical history, family history, social history, rooming notes and problem list in detail and updated the patient record as necessary.      Past Medical History:   Diagnosis Date     Anxiety      Anxiety      Femur fracture (H)     x4     Femur fracture (H)     x4     Humerus fracture 2000     Humerus fracture 2000     Hypertension      Hypertension      Past Surgical History:   Procedure Laterality Date     HC OPEN TX BIMALLEOLAR ANKLE FX, INCL INTERNAL FIXATION      Description: Open Treatment Of Bimalleolar Ankle Fracture;  Recorded: 09/27/2012;     KNEE SURGERY       Patient has no known allergies.  Current Outpatient Medications   Medication Sig Dispense Refill     atenolol-chlorthalidone (TENORETIC) 50-25 MG tablet Take 1 tablet by mouth daily 90 tablet 1     Family History   Problem Relation Age of Onset     Mental retardation Brother      Breast Cancer Maternal Grandmother      Coronary Artery Disease Paternal Grandfather        Patient Active Problem List   Diagnosis     Closed Fracture Of The Patella     Bone cyst     Traumatic wound dehiscence     Laceration of right buttock, initial encounter     Secondary hypertension     Elevated blood pressure     Morbid obesity (H)       Review of Systems   12 point comprehensive review of systems was negative except as noted and HPI     Social History      Social History Narrative     Not on file       Objective:     Vitals:    05/02/22 1126 05/02/22 1145   BP: (!) 156/92 (!) 148/94   Pulse: 84    Weight: (!) 166.2 kg (366 lb 4.8 oz)        Physical Exam:   Physical Exam:  General Appearance:  Appears comfortable, Alert, cooperative, no distress,   Head: Normocephalic, without obvious abnormality, atraumatic  Eyes: PERRL, conjunctiva/corneas clear, EOM's intact, both eyes             Nose: Nares normal, no drainage   Throat: Lips, mucosa, and tongue normal; teeth and gums normal  Neck: Supple, symmetrical, trachea midline, no adenopathy;                      Lungs: Clear to auscultation bilaterally, respirations unlabored   Heart: Regular rate and rhythm, S1 and S2 normal, no murmur, rubs or gallop  Extremities: Extremities normal, atraumatic, no cyanosis or edema  Pulses: DP pulses are 1-2+ bilat.    Skin: no rashes or lesions       20minutes spent on the day of encounter doing chart review, history and exam, documentation, and further activities as noted.     This note has been dictated using voice recognition software. Any grammatical or context distortions are unintentional and inherent to the software    Bridgette Peace MD     There are no Patient Instructions on file for this visit.

## 2022-05-03 LAB — HCV AB SERPL QL IA: NEGATIVE

## 2022-07-10 ASSESSMENT — SLEEP AND FATIGUE QUESTIONNAIRES
HOW LIKELY ARE YOU TO NOD OFF OR FALL ASLEEP WHILE LYING DOWN TO REST IN THE AFTERNOON WHEN CIRCUMSTANCES PERMIT: HIGH CHANCE OF DOZING
HOW LIKELY ARE YOU TO NOD OFF OR FALL ASLEEP WHILE SITTING INACTIVE IN A PUBLIC PLACE: SLIGHT CHANCE OF DOZING
HOW LIKELY ARE YOU TO NOD OFF OR FALL ASLEEP WHILE WATCHING TV: MODERATE CHANCE OF DOZING
HOW LIKELY ARE YOU TO NOD OFF OR FALL ASLEEP WHILE SITTING AND READING: WOULD NEVER DOZE
HOW LIKELY ARE YOU TO NOD OFF OR FALL ASLEEP IN A CAR, WHILE STOPPED FOR A FEW MINUTES IN TRAFFIC: WOULD NEVER DOZE
HOW LIKELY ARE YOU TO NOD OFF OR FALL ASLEEP WHILE SITTING AND TALKING TO SOMEONE: WOULD NEVER DOZE
HOW LIKELY ARE YOU TO NOD OFF OR FALL ASLEEP WHEN YOU ARE A PASSENGER IN A CAR FOR AN HOUR WITHOUT A BREAK: HIGH CHANCE OF DOZING
HOW LIKELY ARE YOU TO NOD OFF OR FALL ASLEEP WHILE SITTING QUIETLY AFTER LUNCH WITHOUT ALCOHOL: WOULD NEVER DOZE

## 2022-07-11 ENCOUNTER — VIRTUAL VISIT (OUTPATIENT)
Dept: SLEEP MEDICINE | Facility: CLINIC | Age: 37
End: 2022-07-11
Attending: FAMILY MEDICINE
Payer: COMMERCIAL

## 2022-07-11 VITALS — WEIGHT: 315 LBS | HEIGHT: 72 IN | BODY MASS INDEX: 42.66 KG/M2

## 2022-07-11 DIAGNOSIS — I15.9 SECONDARY HYPERTENSION: ICD-10-CM

## 2022-07-11 DIAGNOSIS — F51.04 CHRONIC INSOMNIA: ICD-10-CM

## 2022-07-11 DIAGNOSIS — G47.30 OBSERVED SLEEP APNEA: ICD-10-CM

## 2022-07-11 DIAGNOSIS — R06.83 SNORING: Primary | ICD-10-CM

## 2022-07-11 PROCEDURE — 99204 OFFICE O/P NEW MOD 45 MIN: CPT | Mod: GT | Performed by: PHYSICIAN ASSISTANT

## 2022-07-11 NOTE — PATIENT INSTRUCTIONS
"      MY TREATMENT INFORMATION FOR SLEEP APNEA-  Dk Akins    DOCTOR : Bennett Goltz, PA-[facility]    Am I having a sleep study at a sleep center?  --->Due to normal delays, you will be contacted within 2-4 weeks to schedule    Am I having a home sleep study?  --->Watch the video for the device you are using:    -/drop off device-   https://www.[employer].com/watch?v=yGGFBdELGhk    -Disposable device sent out require phone/computer application-   https://www.[employer].com/watch?v=BCce_vbiwxE      Frequently asked questions:  1. What is Obstructive Sleep Apnea (LATOYA)? LATOYA is the most common type of sleep apnea. Apnea means, \"without breath.\"  Apnea is most often caused by narrowing or collapse of the upper airway as muscles relax during sleep.   Almost everyone has occasional apneas. Most people with sleep apnea have had brief interruptions at night frequently for many years.  The severity of sleep apnea is related to how frequent and severe the events are.   2. What are the consequences of LATOYA? Symptoms include: feeling sleepy during the day, snoring loudly, gasping or stopping of breathing, trouble sleeping, and occasionally morning headaches or heartburn at night.  Sleepiness can be serious and even increase the risk of falling asleep while driving. Other health consequences may include development of high blood pressure and other cardiovascular disease in persons who are susceptible. Untreated LATOYA  can contribute to heart disease, stroke and diabetes.   3. What are the treatment options? In most situations, sleep apnea is a lifelong disease that must be managed with daily therapy. Medications are not effective for sleep apnea and surgery is generally not considered until other therapies have been tried. Your treatment is your choice . Continuous Positive Airway (CPAP) works right away and is the therapy that is effective in nearly everyone. An oral device to hold your jaw forward is usually the next most " reliable option. Other options include postioning devices (to keep you off your back), weight loss, and surgery including a tongue pacing device. There is more detail about some of these options below.  4. Are my sleep studies covered by insurance? Although we will request verification of coverage, we advise you also check in advance of the study to ensure there is coverage.    Important tips for those choosing CPAP and similar devices   Know your equipment:  CPAP is continuous positive airway pressure that prevents obstructive sleep apnea by keeping the throat from collapsing while you are sleeping. In most cases, the device is  smart  and can slowly self-adjusts if your throat collapses and keeps a record every day of how well you are treated-this information is available to you and your care team.  BPAP is bilevel positive airway pressure that keeps your throat open and also assists each breath with a pressure boost to maintain adequate breathing.  Special kinds of BPAP are used in patients who have inadequate breathing from lung or heart disease. In most cases, the device is  smart  and can slowly self-adjusts to assist breathing. Like CPAP, the device keeps a record of how well you are treated.  Your mask is your connection to the device. You get to choose what feels most comfortable and the staff will help to make sure if fits. Here: are some examples of the different masks that are available:       Key points to remember on your journey with sleep apnea:  Sleep study.  PAP devices often need to be adjusted during a sleep study to show that they are effective and adjusted right.  Good tips to remember: Try wearing just the mask during a quiet time during the day so your body adapts to wearing it. A humidifier is recommended for comfort in most cases to prevent drying of your nose and throat. Allergy medication from your provider may help you if you are having nasal congestion.  Getting settled-in. It takes  more than one night for most of us to get used to wearing a mask. Try wearing just the mask during a quiet time during the day so your body adapts to wearing it. A humidifier is recommended for comfort in most cases. Our team will work with you carefully on the first day and will be in contact within 4 days and again at 2 and 4 weeks for advice and remote device adjustments. Your therapy is evaluated by the device each day.   Use it every night. The more you are able to sleep naturally for 7-8 hours, the more likely you will have good sleep and to prevent health risks or symptoms from sleep apnea. Even if you use it 4 hours it helps. Occasionally all of us are unable to use a medical therapy, in sleep apnea, it is not dangerous to miss one night.   Communicate. Call our skilled team on the number provided on the first day if your visit for problems that make it difficult to wear the device. Over 2 out of 3 patients can learn to wear the device long-term with help from our team. Remember to call our team or your sleep providers if you are unable to wear the device as we may have other solutions for those who cannot adapt to mask CPAP therapy. It is recommended that you sleep your sleep provider within the first 3 months and yearly after that if you are not having problems.   Use it for your health. We encourage use of CPAP masks during daytime quiet periods to allow your face and brain to adapt to the sensation of CPAP so that it will be a more natural sensation to awaken to at night or during naps. This can be very useful during the first few weeks or months of adapting to CPAP though it does not help medically to wear CPAP during wakefulness and  should not be used as a strategy just to meet guidelines.  Take care of your equipment. Make sure you clean your mask and tubing using directions every day and that your filter and mask are replaced as recommended or if they are not working.     BESIDES CPAP, WHAT OTHER  THERAPIES ARE THERE?    Positioning Device  Positioning devices are generally used when sleep apnea is mild and only occurs on your back.This example shows a pillow that straps around the waist. It may be appropriate for those whose sleep study shows milder sleep apnea that occurs primarily when lying flat on one's back. Preliminary studies have shown benefit but effectiveness at home may need to be verified by a home sleep test. These devices are generally not covered by medical insurance.  Examples of devices that maintain sleeping on the back to prevent snoring and mild sleep apnea.    Belt type body positioner  http://InCytu.Simpleshow/    Electronic reminder  http://nightshifttherapy.com/  http://www.[employer].Simpleshow.au/      Oral Appliance  What is oral appliance therapy?  An oral appliance device fits on your teeth at night like a retainer used after having braces. The device is made by a specialized dentist and requires several visits over 1-2 months before a manufactured device is made to fit your teeth and is adjusted to prevent your sleep apnea. Once an oral device is working properly, snoring should be improved. A home sleep test may be recommended at that time if to determine whether the sleep apnea is adequately treated.       Some things to remember:  -Oral devices are often, but not always, covered by your medical insurance. Be sure to check with your insurance provider.   -If you are referred for oral therapy, you will be given a list of specialized dentists to consider or you may choose to visit the Web site of the American Academy of Dental Sleep Medicine  -Oral devices are less likely to work if you have severe sleep apnea or are extremely overweight.     More detailed information  An oral appliance is a small acrylic device that fits over the upper and lower teeth  (similar to a retainer or a mouth guard). This device slightly moves jaw forward, which moves the base of the tongue forward, opens the  airway, improves breathing for effective treat snoring and obstructive sleep apnea in perhaps 7 out of 10 people .  The best working devices are custom-made by a dental device  after a mold is made of the teeth 1, 2, 3.  When is an oral appliance indicated?  Oral appliance therapy is recommended as a first-line treatment for patients with primary snoring, mild sleep apnea, and for patients with moderate sleep apnea who prefer appliance therapy to use of CPAP4, 5. Severity of sleep apnea is determined by sleep testing and is based on the number of respiratory events per hour of sleep.   How successful is oral appliance therapy?  The success rate of oral appliance therapy in patients with mild sleep apnea is 75-80% while in patients with moderate sleep apnea it is 50-70%. The chance of success in patients with severe sleep apnea is 40-50%. The research also shows that oral appliances have a beneficial effect on the cardiovascular health of LATOYA patients at the same magnitude as CPAP therapy7.  Oral appliances should be a second-line treatment in cases of severe sleep apnea, but if not completely successful then a combination therapy utilizing CPAP plus oral appliance therapy may be effective. Oral appliances tend to be effective in a broad range of patients although studies show that the patients who have the highest success are females, younger patients, those with milder disease, and less severe obesity. 3, 6.   Finding a dentist that practices dental sleep medicine  Specific training is available through the American Academy of Dental Sleep Medicine for dentists interested in working in the field of sleep. To find a dentist who is educated in the field of sleep and the use of oral appliances, near you, visit the Web site of the American Academy of Dental Sleep Medicine.    References  1. karla Sarah al. Objectively measured vs self-reported compliance during oral appliance therapy for sleep-disordered  breathing. Chest 2013; 144(5): 3999-4915.  2. Yobani, et al. Objective measurement of compliance during oral appliance therapy for sleep-disordered breathing. Thorax 2013; 68(1): 91-96.  3. Cristina et al. Mandibular advancement devices in 620 men and women with LATOYA and snoring: tolerability and predictors of treatment success. Chest 2004; 125: 1297-5323.  4. Nona et al. Oral appliances for snoring and LATOYA: a review. Sleep 2006; 29: 244-262.  5. Jonas et al. Oral appliance treatment for LATOYA: an update. J Clin Sleep Med 2014; 10(2): 215-227.  6. Dorina et al. Predictors of OSAH treatment outcome. J Dent Res 2007; 86: 5799-5228.      Weight Loss:    Weight loss is a long-term strategy that may improve sleep apnea in some patients.    Weight management is a personal decision and the decision should be based on your interest and the potential benefits.  If you are interested in exploring weight loss strategies, the following discussion covers the impact on weight loss on sleep apnea and the approaches that may be successful.    Being overweight does not necessarily mean you will have health consequences.  Those who have BMI over 35 or over 27 with existing medical conditions carries greater risk.   Weight loss decreases severity of sleep apnea in most people with obesity. For those with mild obesity who have developed snoring with weight gain, even 15-30 pound weight loss can improve and occasionally eliminate sleep apnea.  Structured and life-long dietary and health habits are necessary to lose weight and keep healthier weight levels.     Though there may be significant health benefits from weight loss, long-term weight loss is very difficult to achieve- studies show success with dietary management in less than 10% of people. In addition, substantial weight loss may require years of dietary control and may be difficult if patients have severe obesity. In these cases, surgical management may be  considered.  Finally, older individuals who have tolerated obesity without health complications may be less likely to benefit from weight loss strategies.      BMI 47    Surgery:    Surgery for obstructive sleep apnea is considered generally only when other therapies fail to work. Surgery may be discussed with you if you are having a difficult time tolerating CPAP and or when there is an abnormal structure that requires surgical correction.  Nose and throat surgeries often enlarge the airway to prevent collapse.  Most of these surgeries create pain for 1-2 weeks and up to half of the most common surgeries are not effective throughout life.  You should carefully discuss the benefits and drawbacks to surgery with your sleep provider and surgeon to determine if it is the best solution for you.   More information  Surgery for LATOYA is directed at areas that are responsible for narrowing or complete obstruction of the airway during sleep.  There are a wide range of procedures available to enlarge and/or stabilize the airway to prevent blockage of breathing in the three major areas where it can occur: the palate, tongue, and nasal regions.  Successful surgical treatment depends on the accurate identification of the factors responsible for obstructive sleep apnea in each person.  A personalized approach is required because there is no single treatment that works well for everyone.  Because of anatomic variation, consultation with an examination by a sleep surgeon is a critical first step in determining what surgical options are best for each patient.  In some cases, examination during sedation may be recommended in order to guide the selection of procedures.  Patients will be counseled about risks and benefits as well as the typical recovery course after surgery. Surgery is typically not a cure for a person s LATOYA.  However, surgery will often significantly improve one s LATOYA severity (termed  success rate ).  Even in the  absence of a cure, surgery will decrease the cardiovascular risk associated with OSA7; improve overall quality of life8 (sleepiness, functionality, sleep quality, etc).      Palate Procedures:  Patients with LATOYA often have narrowing of their airway in the region of their tonsils and uvula.  The goals of palate procedures are to widen the airway in this region as well as to help the tissues resist collapse.  Modern palate procedure techniques focus on tissue conservation and soft tissue rearrangement, rather than tissue removal.  Often the uvula is preserved in this procedure. Residual sleep apnea is common in patient after pharyngoplasty with an average reduction in sleep apnea events of 33%2.      Tongue Procedures:  ExamWhile patients are awake, the muscles that surround the throat are active and keep this region open for breathing. These muscles relax during sleep, allowing the tongue and other structures to collapse and block breathing.  There are several different tongue procedures available.  Selection of a tongue base procedure depends on characteristics seen on physical exam.  Generally, procedures are aimed at removing bulky tissues in this area or preventing the back of the tongue from falling back during sleep.  Success rates for tongue surgery range from 50-62%3.    Hypoglossal Nerve Stimulation:  Hypoglossal nerve stimulation has recently received approval from the [place] Food and Drug Administration for the treatment of obstructive sleep apnea.  This is based on research showing that the system was safe and effective in treating sleep apnea6.  Results showed that the median AHI score decreased 68%, from 29.3 to 9.0. This therapy uses an implant system that senses breathing patterns and delivers mild stimulation to airway muscles, which keeps the airway open during sleep.  The system consists of three fully implanted components: a small generator (similar in size to a pacemaker), a breathing  sensor, and a stimulation lead.  Using a small handheld remote, a patient turns the therapy on before bed and off upon awakening.    Candidates for this device must be greater than 22 years of age, have moderate to severe LATOYA (AHI between 20-65), BMI less than 32, have tried CPAP/oral appliance without success, and have appropriate upper airway anatomy (determined by a sleep endoscopy performed by Dr. Patel).    Hypoglossal Nerve Stimulation Pathway:    The sleep surgeon s office will work with the patient through the insurance prior-authorization process (including communications and appeals).    Nasal Procedures:  Nasal obstruction can interfere with nasal breathing during the day and night.  Studies have shown that relief of nasal obstruction can improve the ability of some patients to tolerate positive airway pressure therapy for obstructive sleep apnea1.  Treatment options include medications such as nasal saline, topical corticosteroid and antihistamine sprays, and oral medications such as antihistamines or decongestants. Non-surgical treatments can include external nasal dilators for selected patients. If these are not successful by themselves, surgery can improve the nasal airway either alone or in combination with these other options.      Combination Procedures:  Combination of surgical procedures and other treatments may be recommended, particularly if patients have more than one area of narrowing or persistent positional disease.  The success rate of combination surgery ranges from 66-80%2,3.    References  David MICHAEL. The Role of the Nose in Snoring and Obstructive Sleep Apnoea: An Update.  Eur Arch Otorhinolaryngol. 2011; 268: 1365-73.   Cori SM; Jessica JA; Kyree JR; Pallanch JF; Eunice MB; Kavya SG; Vee PAGAN. Surgical modifications of the upper airway for obstructive sleep apnea in adults: a systematic review and meta-analysis. SLEEP 2010;33(10):2217-3413. Mai MCMANUS. Hypopharyngeal surgery in  obstructive sleep apnea: an evidence-based medicine review.  Arch Otolaryngol Head Neck Surg. 2006 Feb;132(2):206-13.  Sourav YH1, Tete Y, Telly NATHALIA. The efficacy of anatomically based multilevel surgery for obstructive sleep apnea. Otolaryngol Head Neck Surg. 2003 Oct;129(4):327-35.  Mia MCMANUS, Goldberg A. Hypopharyngeal Surgery in Obstructive Sleep Apnea: An Evidence-Based Medicine Review. Arch Otolaryngol Head Neck Surg. 2006 Feb;132(2):206-13.  Ryan MAYO et al. Upper-Airway Stimulation for Obstructive Sleep Apnea.  N Engl J Med. 2014 Jan 9;370(2):139-49.  Jenaro Y et al. Increased Incidence of Cardiovascular Disease in Middle-aged Men with Obstructive Sleep Apnea. Am J Respir Crit Care Med; 2002 166: 159-165  Cartagenajoanie HENDRICKSON et al. Studying Life Effects and Effectiveness of Palatopharyngoplasty (SLEEP) study: Subjective Outcomes of Isolated Uvulopalatopharyngoplasty. Otolaryngol Head Neck Surg. 2011; 144: 623-631.        WHAT IF I ONLY HAVE SNORING?    Mandibular advancement devices, lateral sleep positioning, long-term weight loss and treatment of nasal allergies have been shown to improve snoring.  Exercising tongue muscles with a game (https://www.ncbi.nlm.nih.gov/pubmed/71325001) or stimulating the tongue during the day with a device (https://doi.org/10.3390/ekr08208911) have improved snoring in some individuals.    Remember to Drive Safe... Drive Alive     Sleep health profoundly affects your health, mood, and your safety.  Thirty three percent of the population (one in three of us) is not getting enough sleep and many have a sleep disorder. Not getting enough sleep or having an untreated / undertreated sleep condition may make us sleepy without even knowing it. In fact, our driving could be dramatically impaired due to our sleep health. As your provider, here are some things I would like you to know about driving:     Here are some warning signs for impairment and dangerous drowsy driving:              -Having  been awake more than 16 hours               -Looking tired               -Eyelid drooping              -Head nodding (it could be too late at this point)              -Driving for more than 30 minutes     Some things you could do to make the driving safer if you are experiencing some drowsiness:              -Stop driving and rest              -Call for transportation              -Make sure your sleep disorder is adequately treated     Some things that have been shown NOT to work when experiencing drowsiness while driving:              -Turning on the radio              -Opening windows              -Eating any  distracting  /  entertaining  foods (e.g., sunflower seeds, candy, or any other)              -Talking on the phone      Your decision may not only impact your life, but also the life of others. Please, remember to drive safe for yourself and all of us.

## 2022-07-11 NOTE — NURSING NOTE
Chief Complaint   Patient presents with     Video Visit     New Sleep:  Excessive daytime sleepiness       Patient confirms medications and allergies are accurate via patients echeck in completion, and or denies any changes since last reviewed/verified.     Flu shot 12/28/21    Cami Craig, Virtual Facilitator/JAMESN

## 2022-07-11 NOTE — PROGRESS NOTES
Nadir is a 36 year old who is being evaluated via a billable video visit.      How would you like to obtain your AVS? MyChart  If the video visit is dropped, the invitation should be resent by: Text to cell phone: [phone]  Will anyone else be joining your video visit? Neisha Craig, Cely Facilitator/LPN      Video-Visit Details    Video Start Time: 8:30 AM    Type of service:  Video Visit    Video End Time:9:02 AM    Originating Location (pt. Location): Home    Distant Location (provider location):  [facility] [place]     Platform used for Video Visit: [facility]      Outpatient Sleep Medicine Consultation:      Name: kD Akins MRN# [id number]   Age: 36 year old YOB: 1985     Date of Consultation: July 11, 2022  Consultation is requested by: Bridgette Peace MD  [address] [place] [address] Bridgette Peace  Primary care provider: Usha Garcia       Reason for Sleep Consult:     Dk Akins is sent by Bridgette Peace for a sleep consultation regarding LATOYA.    Patient s Reason for visit  Dk Akins main reason for visit: Gasping for breath/choking while sleeping, feeling fatigued after a night s sleep  Patient states problem(s) started: Many years ago  Dk Akins's goals for this visit: Speak with provider regarding sleeping issues           Assessment and Plan:     Summary Sleep Diagnoses and Recommendations:  (R06.83) Snoring  (primary encounter diagnosis), (G47.30) Observed sleep apnea, (Z68.42) BMI 45.0-49.9, adult (H), (I15.9) Secondary hypertension  Comment: Nadir presents for evaluation of probable sleep apnea. He has snored loudly for his entire adult life. He wakes himself with gasping frequently. He feels unrefreshed by his sleep but denies inadvertent dozing. His ESS is just subthreshold at 9/24). STOP BANG TOTAL: 6 snoring, tired, observed apnea, HTN, BMI >35 (47), male gender. We do not have a neck measurement, but it is  likely over 40 cm. His BMI of 47 confers some risk for hypoventilation, but he denies morning headaches or confusion and his CO2 on metabolic panels has been no higher than 26.   Plan: HST-Home Sleep Apnea Test            (F51.04) Chronic insomnia  Comment: Nadir has some difficulty falling asleep on 4-5 nights per week and difficulty getting back to sleep after awakenings a couple of times per week. He sometimes has a racing mind. He also sometimes has late caffeine. He has a history of anxiety. He also indicates that he sometimes wakes with a gasp and then has trouble getting back to sleep.  Plan: I suspect his insomnia will improve once his apnea is treated. We will reassess at a future visit.      Comorbid Diagnoses:  HTN, morbid obesity        Summary Counseling:    Sleep Testing Reviewed  Obstructive Sleep Apnea Reviewed  Complications of Untreated Sleep Apnea Reviewed      Patient will follow up 2 weeks after sleep study.  Bennett Goltz, PA-C     Total time spent reviewing medical records, history and physical examination, review of previous testing and interpretation as well as documentation on this date: 43 min    CC: Bridgette Peace          History of Present Illness:     Nadir has woken up gasping/choking at night for years. He has been told by friends at [employer] that he has to go to bed last or they wouldn't fall asleep. His wife says it seemed to get better when his weight was lower (330#). It has been bad again in the last 3-4 years. He feels unrefreshed by his sleep. He may nap in the afternoon if he has time, but usually does not have trouble staying awake if he needs to.     Past Sleep Evaluations: none    SLEEP-WAKE SCHEDULE:     Work/School Days: Patient goes to school/work: Yes   Usually gets into bed at 11:00pm  Takes patient about 10 minutes to 1 hour to fall asleep  Has trouble falling asleep 4-5 nights per week, probably if his mind is running. Sometimes he dozes, wakes with a gasp and then can't  get back to sleep.  Wakes up in the middle of the night 2-3 times.  Wakes up due to Snorting self awake, Use the bathroom, Anxiety  He has trouble falling back asleep 2 times a week.   It usually takes It varies to get back to sleep  Patient is usually up at 8am  Uses alarm: Yes    Weekends/Non-work Days/All Other Days:  Usually gets into bed at 12am   Takes patient about Varies to fall asleep  Patient is usually up at 9am  Uses alarm: No  He does not feel he sleeps better on weekends    Sleep Need  Patient gets  5-6 hours sleep on average   Patient thinks he needs about 8 hours sleep    Dk Akins prefers to sleep in this position(s): Side, Stomach  Patient states they do the following activities in bed: Use phone, computer, or tablet- on phone    Naps  Patient takes a purposeful nap 2-3 times a week and naps are usually 1-2 hours in duration  He feels better after a nap: Yes  He dozes off unintentionally Occasionally days per week  Patient has had a driving accident or near-miss due to sleepiness/drowsiness: No      SLEEP DISRUPTIONS:    Breathing/Snoring  Patient snores:Yes  Other people complain about his snoring: Yes  Patient has been told he stops breathing in his sleep: Yes  He has issues with the following: Heartburn or reflux at night, Getting up to urinate more than once  No morning headaches or difficulty breathing through his nose. He denies feeling short of breath at night or laying flat. CO2 has been 26 or less on metabolic panels.    Movement:  Patient gets pain, discomfort, with an urge to move:  No  It happens when he is resting:  No  It happens more at night:  No  Patient has been told he kicks his legs at night:  Yes- as he is waking himself from gasping     Behaviors in Sleep:  Dk Akins has experienced the following behaviors while sleeping:    Pt denies bruxism, sleep talking, sleep walking, and dream enactment behavior. Pt denies sleep paralysis, hypnagogue and cataplexy.        Is there anything else you would like your sleep provider to know: No      CAFFEINE AND OTHER SUBSTANCES:    Patient consumes caffeinated beverages per day:  4-5 pop, energy drinks (1)  Last caffeine use is usually: Varies-sometimes as late as 9pm  List of any prescribed or over the counter stimulants that patient takes:  none  List of any prescribed or over the counter sleep medication patient takes: No  List of previous sleep medications that patient has tried: may have tried melatonin years ago (does not recall if it helped)  Patient drinks alcohol to help them sleep: No  Patient drinks alcohol near bedtime: No    Family History:  Patient has a family member been diagnosed with a sleep disorder: Yes  Maternal grandmother, aunt     Social history:  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  He lives with his wife and child.    SCALES:    EPWORTH SLEEPINESS SCALE      Melrose Sleepiness Scale ( KARAN Cervantes  1990-1997[facility]/English - Final version - 21 Nov 07 - [facility] Research [employer].) 7/10/2022   Sitting and reading Would never doze   Watching TV Moderate chance of dozing   Sitting, inactive in a public place (e.g. a theatre or a meeting) Slight chance of dozing   As a passenger in a car for an hour without a break High chance of dozing   Lying down to rest in the afternoon when circumstances permit High chance of dozing   Sitting and talking to someone Would never doze   Sitting quietly after a lunch without alcohol Would never doze   In a car, while stopped for a few minutes in traffic Would never doze   Melrose Score (MC) 9   Melrose Score (Sleep) 9         INSOMNIA SEVERITY INDEX (EVERARDO)      Insomnia Severity Index (EVERARDO) 7/10/2022   Difficulty falling asleep 2   Difficulty staying asleep 1   Problems waking up too  early 3   How SATISFIED/DISSATISFIED are you with your CURRENT sleep pattern? 3   How NOTICEABLE to others do you think your sleep problem is in terms of impairing the quality of your life? 4   How WORRIED/DISTRESSED are you about your current sleep problem? 4   To what extent do you consider your sleep problem to INTERFERE with your daily functioning (e.g. daytime fatigue, mood, ability to function at work/daily chores, concentration, memory, mood, etc.) CURRENTLY? 4   EVERARDO Total Score 21       Guidelines for Scoring/Interpretation:  Total score categories:  0-7 = No clinically significant insomnia   8-14 = Subthreshold insomnia   15-21 = Clinical insomnia (moderate severity)  22-28 = Clinical insomnia (severe)  Used via courtesy of www.[employer].va.gov with permission from Pablo Cifuentes PhD., [facility]      STOP BANG     STOP BANG Questionnaire (  2008, the American Society of Anesthesiologists, Inc. [employer] 7/11/2022   1. Snoring - Do you snore loudly (louder than talking or loud enough to be heard through closed doors)? -   2. Tired - Do you often feel tired, fatigued, or sleepy during daytime? -   3. Observed - Has anyone observed you stop breathing during your sleep? -   4. Blood pressure - Do you have or are you being treated for high blood pressure? -   5. BMI - BMI more than 35 kg/m2? -   6. Age - Age over 50 yr old? -   7. Neck circumference - Neck circumference greater than 40 cm? -   8. Gender - Gender male? -   STOP BANG Score (MC): -   B/P Clinic: -   BMI Clinic: 47.47           PATIENT HEALTH QUESTIONNAIRE-9 (PHQ - 9)    PHQ-9 ([employer]) 3/16/2020   1.  Little interest or pleasure in doing things 0   2.  Feeling down, depressed, or hopeless 0       Developed by Shanthi Cuenca, Annalise Da Silva, Leo Cline and colleagues, with an educational phoebe from [employer] No permission required to reproduce, translate, display or distribute.        Allergies:     No Known Allergies    Medications:    Current Outpatient Medications   Medication Sig Dispense Refill     atenolol-chlorthalidone (TENORETIC) 50-25 MG tablet Take 1 tablet by mouth daily 90 tablet 1       Problem List:  Patient Active Problem List    Diagnosis Date Noted     Morbid obesity (H) 04/18/2022     Priority: Medium     Secondary hypertension 06/05/2019     Priority: Medium     Laceration of right buttock, initial encounter      Priority: Medium     Traumatic wound dehiscence 04/11/2019     Priority: Medium     Added automatically from request for surgery [id number]         Bone cyst      Priority: Medium     Created by [employer] [facility] Annotation: Jun 2 2008  8:Azul Rodgers: humeral   unicameral bone cyst  Replacement Utility updated for latest IMO load      Formatting of this note might be different from the original.  Created by [employer] [facility] Annotation: Jun 2 2008  8:Azul Rodgers: humeral   unicameral bone cyst    Replacement Utility updated for latest IMO load       Elevated blood pressure 09/08/2015     Priority: Medium     Closed Fracture Of The Patella      Priority: Medium     Created by Conversion            Past Medical/Surgical History:  Past Medical History:   Diagnosis Date     Anxiety      Anxiety      Femur fracture (H)     x4     Femur fracture (H)     x4     Humerus fracture 2000     Humerus fracture 2000     Hypertension      Hypertension      Past Surgical History:   Procedure Laterality Date     HC OPEN TX BIMALLEOLAR ANKLE FX, INCL INTERNAL FIXATION      Description: Open Treatment Of Bimalleolar Ankle Fracture;  Recorded: 09/27/2012;     KNEE SURGERY         Social History:  Social History     Socioeconomic History     Marital status:      Spouse name: Not on file     Number of children: Not on file     Years of education: Not on file     Highest education level: Not on file   Occupational History     Not on file   Tobacco Use     Smoking  status: Current Every Day Smoker     Smokeless tobacco: Never Used     Tobacco comment: marijuana, not tobacco   Substance and Sexual Activity     Alcohol use: Yes     Comment: once a month usually, Anywhere from 2 to 10     Drug use: Yes     Types: Marijuana     Comment: couple times per day     Sexual activity: Not on file   Other Topics Concern     Not on file   Social History Narrative     Not on file     Social Determinants of Health     Financial Resource Strain: Not on file   Food Insecurity: Not on file   Transportation Needs: Not on file   Physical Activity: Not on file   Stress: Not on file   Social Connections: Not on file   Intimate Partner Violence: Not on file   Housing Stability: Not on file       Family History:  Family History   Problem Relation Age of Onset     Mental retardation Brother      Breast Cancer Maternal Grandmother      Coronary Artery Disease Paternal Grandfather        Review of Systems:  A complete review of systems reviewed by me is negative with the exeption of what has been mentioned in the history of present illness.  In the last TWO WEEKS have you experienced any of the following symptoms?  Fevers: No  Night Sweats: No  Weight Gain: No  Pain at Night: No  Double Vision: No  Changes in Vision: No  Difficulty Breathing through Nose: No  Sore Throat in Morning: No  Dry Mouth in the Morning: No  Shortness of Breath Lying Flat: Yes-apnea  Shortness of Breath With Activity: Yes  Awakening with Shortness of Breath: No  Increased Cough: No  Heart Racing at Night: No  Swelling in Feet or Legs: No  Diarrhea at Night: No  Heartburn at Night: No  Urinating More than Once at Night: Yes  Losing Control of Urine at Night: No  Joint Pains at Night: No  Headaches in Morning: No  Weakness in Arms or Legs: No  Depressed Mood: No  Anxiety: Yes     Physical Examination:  Vitals: Ht 1.829 m (6')   Wt (!) 158.8 kg (350 lb)   BMI 47.47 kg/m    BMI= Body mass index is 47.47 kg/m .           GENERAL  APPEARANCE: healthy, alert, no distress and cooperative  EYES: Eyes grossly normal to inspection  HENT: oral mucous membranes moist and oropharynx clear  NECK: no asymmetry, masses, or scars  RESP: no respiratory distress, cough or wheeze  Mallampati Class: II.  Tonsillar Stage: 1  hidden by pillars.         Data: All pertinent previous laboratory data reviewed     Recent Labs   Lab Test 05/02/22  1207 07/31/20  1735    138   POTASSIUM 4.2 3.9   CHLORIDE 99 105   CO2 26 23   ANIONGAP 15 10    113   BUN 15 8   CR 0.80 0.73   KAROL 9.9 9.9       Recent Labs   Lab Test 07/31/20  1736   WBC 18.4*   RBC 4.99   HGB 15.4   HCT 43.8   MCV 88   MCH 30.9   MCHC 35.2   RDW 13.2          No results for input(s): PROTTOTAL, ALBUMIN, BILITOTAL, ALKPHOS, AST, ALT, BILIDIRECT in the last 36899 hours.    No results found for: TSH    No results found for: UAMP, UBARB, BENZODIAZEUR, UCANN, UCOC, OPIT, UPCP    No results found for: IRONSAT, [id number], MARK ANTHONY    No results found for: PH, PHARTERIAL, PO2, [id number], SAT, PCO2, HCO3, BASEEXCESS, SEDA, BEB    @LABRCNTIPR(phv:4,pco2v:4,po2v:4,hco3v:4,herrera:4,o2per:4)@    Echocardiology: No results found for this or any previous visit (from the past 4320 hour(s)).    Chest x-ray: No results found for this or any previous visit from the past 365 days.      Chest CT: No results found for this or any previous visit from the past 365 days.      PFT: Most Recent [facility] Pulmonary Function Testing    No results found for: 20001      Bennett Ezra Goltz, PA-C, V[facility]CTOR 7/11/2022

## 2022-07-24 ENCOUNTER — HEALTH MAINTENANCE LETTER (OUTPATIENT)
Age: 37
End: 2022-07-24

## 2022-10-01 ENCOUNTER — HEALTH MAINTENANCE LETTER (OUTPATIENT)
Age: 37
End: 2022-10-01

## 2022-11-29 PROBLEM — I10 PRIMARY HYPERTENSION: Status: ACTIVE | Noted: 2019-06-05

## 2023-01-03 ENCOUNTER — OFFICE VISIT (OUTPATIENT)
Dept: SLEEP MEDICINE | Facility: CLINIC | Age: 38
End: 2023-01-03
Payer: COMMERCIAL

## 2023-01-03 DIAGNOSIS — R06.83 SNORING: ICD-10-CM

## 2023-01-03 DIAGNOSIS — G47.33 OSA (OBSTRUCTIVE SLEEP APNEA): Primary | ICD-10-CM

## 2023-01-03 NOTE — PROGRESS NOTES
Pt is completing a home sleep test. Pt was instructed on how to put on the Noxturnal T3 device and associated equipment before going to bed and given the opportunity to practice putting it on before leaving the sleep center. Pt was reminded to bring the home sleep test kit back to the center tomorrow, at agreed upon time for download and reporting.   Adrianne Echavarria CMA on 1/3/2023 at 9:55 AM'

## 2023-01-04 ENCOUNTER — DOCUMENTATION ONLY (OUTPATIENT)
Dept: SLEEP MEDICINE | Facility: CLINIC | Age: 38
End: 2023-01-04
Payer: COMMERCIAL

## 2023-01-04 NOTE — PROCEDURES
HOME SLEEP STUDY INTERPRETATION        Patient: Dk Akins  MRN: [id number]  YOB: 1985  Study Date: 1/3/2023  PCP/Referring Provider: Usha Garcia;   Ordering Provider: Bennett Goltz PA         Indications for Home Study: Dk Akins is a 37 year old male with a history of symptoms suggestive of obstructive sleep apnea.    Estimated body mass index is 47.47 kg/m  as calculated from the following:    Height as of 22: 1.829 m (6').    Weight as of 22: 158.8 kg (350 lb).  Total score - Chandler: 9 (7/10/2022 11:36 PM)  STOP-BAN/8        Data: A full night home sleep study was performed recording the standard physiologic parameters including body position, movement, sound, nasal pressure, thermal oral airflow, chest and abdominal movements with respiratory inductance plethysmography, and oxygen saturation by pulse oximetry. Pulse rate was estimated by oximetry recording. This study was considered adequate based on > 4 hours of quality oximetry and respiratory recording. As specified by the AASM Manual for the Scoring of Sleep and Associated events, version 2.3, Rule VIII.D 1B, 4% oxygen desaturation scoring for hypopneas is used as a standard of care on all home sleep apnea testing.        Analysis Time:  296 minutes        Respiration:   Sleep Associated Hypoxemia: sustained hypoxemia was present. Baseline oxygen saturation was 86%.  Time with saturation less than or equal to 88% was 150 minutes. The lowest oxygen saturation was 62%.   Snoring: Snoring was present.  Respiratory events: The home study revealed a presence of 253 obstructive apneas and 110 mixed and central apneas. There were 54 hypopneas resulting in a combined apnea/hypopnea index [AHI] of 89 events per hour.  AHI was 86 per hour supine, 77 per hour prone, 89 per hour on left side, and 99 per hour on right side.   Pattern: Excluding events noted above, respiratory rate and pattern was Normal.      Heart  Rate: By pulse oximetry tachycardia was noted.       Assessment:     Severe obstructive sleep apnea.    Sleep associated hypoxemia was present.    Recommendations:    Considering the severity of sleep disordered breathing and sustained O2 desaturations advised an in lab titration PSG with TCM monitoring.         Diagnosis Code(s): Obstructive Sleep Apnea G47.33, Hypoxemia G47.36    Timbo Guzman MD, January 4, 2023   Diplomate, American Board of Psychiatry and Neurology, Sleep Medicine

## 2023-01-04 NOTE — PROGRESS NOTES
HST POST-STUDY QUESTIONNAIRE    1. What time did you go to bed?  12:30 am  2. How long do you think it took to fall asleep?  12:35 am  3. What time did you wake up to start the day?  7:30 am  4. Did you get up during the night at all?  yes  5. If you woke up, do you remember approximately what time(s)? frequently  6. Did you have any difficulty with the equipment?  No  7. Did you us any type of treatment with this study?  None  8. Was the head of the bed elevated? No  9. Did you sleep in a recliner?  No  10. Did you stop using CPAP at least 3 days before this test?  NA  11. Any other information you'd like us to know?

## 2023-01-04 NOTE — NURSING NOTE
Pt returned HST device. It was downloaded and forwarded data to the clinical specialist for scoring.  Adrianne Echavarria CMA on 1/4/2023 at 10:00 AM

## 2023-01-04 NOTE — PROGRESS NOTES
Considering severity of sleep disordered breathing and absence of a scheduled follow up I called patient.  He did not  but I left a message advising a titration PSG.  Order placed.  Will ask front dest to schedule a follow up with a provider.

## 2023-01-04 NOTE — PROGRESS NOTES
This HSAT was performed using a Noxturnal T3 device which recorded snore, sound, movement activity, body position, nasal pressure, oronasal thermal airflow, pulse, oximetry and both chest and abdominal respiratory effort. HSAT data was restricted to the time patient states they were in bed.     HSAT was scored using 1B 4% hypopnea rule.     HST AHI (Non-PAT): 89.3  Snoring was reported as loud.  Time with SpO2 below 89% was 149.6 minutes.   Overall signal quality was good.     Pt will follow up with sleep provider to determine appropriate therapy.

## 2023-01-05 DIAGNOSIS — I15.9 SECONDARY HYPERTENSION: ICD-10-CM

## 2023-01-05 DIAGNOSIS — R06.83 SNORING: ICD-10-CM

## 2023-01-05 DIAGNOSIS — G47.30 OBSERVED SLEEP APNEA: ICD-10-CM

## 2023-03-29 ENCOUNTER — TRANSFERRED RECORDS (OUTPATIENT)
Dept: HEALTH INFORMATION MANAGEMENT | Facility: CLINIC | Age: 38
End: 2023-03-29

## 2023-05-09 ENCOUNTER — ALLIED HEALTH/NURSE VISIT (OUTPATIENT)
Dept: FAMILY MEDICINE | Facility: CLINIC | Age: 38
End: 2023-05-09
Payer: COMMERCIAL

## 2023-05-09 VITALS — SYSTOLIC BLOOD PRESSURE: 174 MMHG | DIASTOLIC BLOOD PRESSURE: 124 MMHG

## 2023-05-09 DIAGNOSIS — I10 PRIMARY HYPERTENSION: Primary | ICD-10-CM

## 2023-05-09 PROCEDURE — 99207 PR NO CHARGE NURSE ONLY: CPT

## 2023-05-09 NOTE — PROGRESS NOTES
I met with Dk Akins at the request of Bridgette Peace to recheck his blood pressure.  Blood pressure medications on the med list were reviewed with patient.    Patient has taken all medications as per usual regimen: No  Patient reports tolerating them without any issues or concerns: No    There were no vitals filed for this visit.    After 5 minutes, the patient's blood pressure remained greater than or equal to 140/90.    Is the patient currently having any chest pain? No  Does the patient currently have a headache? No  Does the patient currently have any vision changes? No  Does the patient currently have any nausea? No  Does the patient currently have any abdominal pain? No    The previous encounter was reviewed.  The patient was discharged and the note will be sent to the provider for final review.

## 2023-05-10 ENCOUNTER — OFFICE VISIT (OUTPATIENT)
Dept: FAMILY MEDICINE | Facility: CLINIC | Age: 38
End: 2023-05-10
Payer: COMMERCIAL

## 2023-05-10 VITALS
RESPIRATION RATE: 20 BRPM | BODY MASS INDEX: 42.66 KG/M2 | SYSTOLIC BLOOD PRESSURE: 158 MMHG | DIASTOLIC BLOOD PRESSURE: 124 MMHG | WEIGHT: 315 LBS | HEIGHT: 72 IN | OXYGEN SATURATION: 95 % | HEART RATE: 99 BPM | TEMPERATURE: 98.8 F

## 2023-05-10 DIAGNOSIS — Z00.01 ENCOUNTER FOR ROUTINE ADULT MEDICAL EXAM WITH ABNORMAL FINDINGS: Primary | ICD-10-CM

## 2023-05-10 DIAGNOSIS — I16.0 HYPERTENSIVE URGENCY: ICD-10-CM

## 2023-05-10 DIAGNOSIS — Z87.891 QUIT SMOKING WITHIN PAST YEAR: ICD-10-CM

## 2023-05-10 DIAGNOSIS — E66.01 MORBID OBESITY (H): ICD-10-CM

## 2023-05-10 DIAGNOSIS — F10.29 ALCOHOL DEPENDENCE WITH UNSPECIFIED ALCOHOL-INDUCED DISORDER (H): ICD-10-CM

## 2023-05-10 DIAGNOSIS — F10.10 ALCOHOL CONSUMPTION BINGE DRINKING: ICD-10-CM

## 2023-05-10 DIAGNOSIS — G47.33 OBSTRUCTIVE SLEEP APNEA SYNDROME: ICD-10-CM

## 2023-05-10 DIAGNOSIS — Z13.228 SCREENING FOR METABOLIC DISORDER: ICD-10-CM

## 2023-05-10 DIAGNOSIS — I10 PRIMARY HYPERTENSION: ICD-10-CM

## 2023-05-10 DIAGNOSIS — I15.9 SECONDARY HYPERTENSION: ICD-10-CM

## 2023-05-10 PROBLEM — T81.33XA TRAUMATIC WOUND DEHISCENCE: Status: RESOLVED | Noted: 2019-04-11 | Resolved: 2023-05-10

## 2023-05-10 LAB
AMPHETAMINES UR QL SCN: ABNORMAL
ANION GAP SERPL CALCULATED.3IONS-SCNC: 14 MMOL/L (ref 7–15)
BARBITURATES UR QL SCN: ABNORMAL
BENZODIAZ UR QL SCN: ABNORMAL
BUN SERPL-MCNC: 13.8 MG/DL (ref 6–20)
BZE UR QL SCN: ABNORMAL
CALCIUM SERPL-MCNC: 9.5 MG/DL (ref 8.6–10)
CANNABINOIDS UR QL SCN: ABNORMAL
CHLORIDE SERPL-SCNC: 103 MMOL/L (ref 98–107)
CHOLEST SERPL-MCNC: 181 MG/DL
CREAT SERPL-MCNC: 0.65 MG/DL (ref 0.67–1.17)
CREAT UR-MCNC: 115 MG/DL
CREAT UR-MCNC: 118 MG/DL
DEPRECATED HCO3 PLAS-SCNC: 23 MMOL/L (ref 22–29)
GFR SERPL CREATININE-BSD FRML MDRD: >90 ML/MIN/1.73M2
GLUCOSE SERPL-MCNC: 103 MG/DL (ref 70–99)
HBA1C MFR BLD: 5.8 % (ref 0–5.6)
HDLC SERPL-MCNC: 27 MG/DL
LDLC SERPL CALC-MCNC: 113 MG/DL
MICROALBUMIN UR-MCNC: <12 MG/L
MICROALBUMIN/CREAT UR: NORMAL MG/G{CREAT}
NONHDLC SERPL-MCNC: 154 MG/DL
OPIATES UR QL SCN: ABNORMAL
OXYCODONE UR QL: ABNORMAL
PCP QUAL URINE (ROCHE): ABNORMAL
POTASSIUM SERPL-SCNC: 4.5 MMOL/L (ref 3.4–5.3)
SODIUM SERPL-SCNC: 140 MMOL/L (ref 136–145)
TRIGL SERPL-MCNC: 206 MG/DL
TSH SERPL DL<=0.005 MIU/L-ACNC: 1.51 UIU/ML (ref 0.3–4.2)

## 2023-05-10 PROCEDURE — 99214 OFFICE O/P EST MOD 30 MIN: CPT | Mod: 25 | Performed by: FAMILY MEDICINE

## 2023-05-10 PROCEDURE — 82306 VITAMIN D 25 HYDROXY: CPT | Performed by: FAMILY MEDICINE

## 2023-05-10 PROCEDURE — 99395 PREV VISIT EST AGE 18-39: CPT | Performed by: FAMILY MEDICINE

## 2023-05-10 PROCEDURE — 82570 ASSAY OF URINE CREATININE: CPT | Mod: 59 | Performed by: FAMILY MEDICINE

## 2023-05-10 PROCEDURE — 83036 HEMOGLOBIN GLYCOSYLATED A1C: CPT | Performed by: FAMILY MEDICINE

## 2023-05-10 PROCEDURE — 80061 LIPID PANEL: CPT | Performed by: FAMILY MEDICINE

## 2023-05-10 PROCEDURE — 80307 DRUG TEST PRSMV CHEM ANLYZR: CPT | Performed by: FAMILY MEDICINE

## 2023-05-10 PROCEDURE — 82043 UR ALBUMIN QUANTITATIVE: CPT | Performed by: FAMILY MEDICINE

## 2023-05-10 PROCEDURE — 80048 BASIC METABOLIC PNL TOTAL CA: CPT | Performed by: FAMILY MEDICINE

## 2023-05-10 PROCEDURE — 84443 ASSAY THYROID STIM HORMONE: CPT | Performed by: FAMILY MEDICINE

## 2023-05-10 PROCEDURE — 36415 COLL VENOUS BLD VENIPUNCTURE: CPT | Performed by: FAMILY MEDICINE

## 2023-05-10 RX ORDER — METAPROTERENOL SULFATE 10 MG
500 TABLET ORAL DAILY
Qty: 90 CAPSULE | Refills: 3 | Status: SHIPPED | OUTPATIENT
Start: 2023-05-10 | End: 2024-01-25

## 2023-05-10 RX ORDER — MAGNESIUM OXIDE 400 MG/1
400 TABLET ORAL DAILY
Qty: 90 TABLET | Refills: 1 | Status: SHIPPED | OUTPATIENT
Start: 2023-05-10 | End: 2024-01-25

## 2023-05-10 RX ORDER — ATENOLOL AND CHLORTHALIDONE TABLET 50; 25 MG/1; MG/1
1 TABLET ORAL DAILY
Qty: 90 TABLET | Refills: 4 | Status: SHIPPED | OUTPATIENT
Start: 2023-05-10 | End: 2024-01-25

## 2023-05-10 ASSESSMENT — ENCOUNTER SYMPTOMS
ABDOMINAL PAIN: 0
PALPITATIONS: 0
DIARRHEA: 0
EYE PAIN: 0
NAUSEA: 0
HEMATOCHEZIA: 0
NERVOUS/ANXIOUS: 0
PARESTHESIAS: 0
CHILLS: 0
FEVER: 0
ARTHRALGIAS: 0
HEADACHES: 0
SHORTNESS OF BREATH: 0
COUGH: 0
SORE THROAT: 0
DIZZINESS: 0
CONSTIPATION: 0
HEARTBURN: 1
HEMATURIA: 0
FREQUENCY: 0
JOINT SWELLING: 0
MYALGIAS: 0
DYSURIA: 0
WEAKNESS: 0
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
## 2023-05-11 LAB — DEPRECATED CALCIDIOL+CALCIFEROL SERPL-MC: 23 UG/L (ref 20–75)

## 2023-06-04 ENCOUNTER — TRANSFERRED RECORDS (OUTPATIENT)
Dept: HEALTH INFORMATION MANAGEMENT | Facility: CLINIC | Age: 38
End: 2023-06-04

## 2023-06-27 ENCOUNTER — TRANSFERRED RECORDS (OUTPATIENT)
Dept: HEALTH INFORMATION MANAGEMENT | Facility: CLINIC | Age: 38
End: 2023-06-27
Payer: COMMERCIAL

## 2023-11-08 ENCOUNTER — TRANSFERRED RECORDS (OUTPATIENT)
Dept: HEALTH INFORMATION MANAGEMENT | Facility: CLINIC | Age: 38
End: 2023-11-08

## 2024-01-02 NOTE — PATIENT INSTRUCTIONS
Nausea and constipation are the most common side effects with the GLP-1/GIP medications.     Drinking water throughout the day, preventing and or treating constipation, and eating 3 nutrient dense meals containing protein is important while on GLP-1 RA/GIP medications. It can mitigate these side effects.     For Prevention and Treatment of Constipation    From least aggressive to most aggressive:    Move: wallking-the more we move, the more our bowels move  Water: Drink water-64oz+ day  Go when you need to go. Don't wait. The longer you wait, the harder it gets.  Fiber: Fruit, raw veggies, nuts, whole grains,   Stool Softeners: if constipation is mild and for maintenance  Gentle laxatives: Miralax, senokot, dulcolax , Smooth move tea as needed    More aggressive (and typically won't get to this point)  Milk of Magnesia  Mag Citrate (what you drink before a colonoscopy)  Suppositories  Enema        If you are planning to have your surgery at [facility], please complete this online video seminar series.    Throughout this six-video series, you will be introduced to some members of our bariatric team, gain a better understanding of what obesity is, learn about the surgery process, review the benefits and risks of surgery, and see a glimpse of life after surgery.      Completion time of all six videos is about 25 minutes and you can watch them each separately as time allows.     Start by clicking on the link below and keep scrolling down to the [facility] Weight Loss Surgery Video.    [email or web address]    Please let us know if you have any questions or issues.             Welcome to [facility]!      We are grateful that you have chosen us to partner with you on your journey to better health!  We have included some very important information for you to read as you begin your journey towards weight loss surgery. We will discuss parts of this as you move closer to  surgery but please reach out if you have any questions or concerns.      Please click on the following links and they will lead you to a printable version of each handout or copy into your browser to view/print at home:    Making Your Decision, Understanding Weight Loss Surgery  [email or web address]    A Roadmap to Your Weight Loss Surgery  [email or web address]    Honoring Choices - Your Rights  [email or web address]    Honoring Choices - [place] Health Care Directive (form that can be filled out)  [email or web address]      Insurance Coverage and Approval for Surgery  Every insurance plan is different.  Many companies approve benefits for surgery, but many have specific requirements that must be completed prior to being approved.    Verification of benefits is the patient's responsibility.  You will be responsible for any charges not covered by your insurance plan - including, but not limited to copays, deductible, out of pocket, any amount over your cap limit, etc.  Using the guideline below, please contact your insurance plan (we recommend you call the Customer Service number on the back of your card).      Once all of the requirements for surgery are complete, you will see the surgeon.  Following this visit, we will submit your information to your insurance plan for Prior Authorization.  We strongly encourage you to submit any documentation that supports your weight loss attempts to us.    Questions that are important to ask your insurance company when you call them:    Are [facility] in my network?  Is bariatric surgery a covered benefit under my policy?  If so, what is my estimated out of pocket expense?  Are there any exclusions or cap limits on my plan for bariatric surgery?  If so, what are they?  What are the criteria necessary to be approved for bariatric surgery?  Do you require medically supervised weight loss visits for approval of  surgery?  If yes, for how many months?  Within the last # of years?  Are the following procedures a covered benefit under my plan?  Laparoscopic Gastric Bypass (CPT Code 35945)  Laparoscopic Sleeve Gastrectomy (CPT Code 44144)  Nutrition/Dietitian Consult (CPT Code 62244  Nutrition/Dietitian Reassessment (CPT Code 44367  Psychological Assessment (CPT Codes 82270 & 41919      Initial labs for Bariatric Surgery    Some lab orders were placed by your doctor in the [facility] system and can be drawn at any of our outpatient hospital labs or your clinic. If you do them at one of three hospitals ([facility] in [place], [facility] in [place], [facility] in [place]) you do not need an appointment but if you'd like to do them at a clinic, you will need to schedule an appointment with that clinic.       You will need to be fasting 10-12 hours prior (you can continue to drink water) and should have them drawn sooner verses later so if any corrections need to be made we will have time to address them.      [facility] lab is open 7 am - 4:30 pm Monday through Friday and 9am-12:30 pm on Saturdays.  [facility] lab is open 7 am -4:30 pm Monday through Friday and 8am to 11:30am on Saturday and Sundays.     If you would like them done at a clinic outside the [facility] system, then we will need to know where to fax the orders.   If you have any questions or would like help scheduling a lab appointment at the [facility], please give us a call on the Bariatric Nurse Line at [phone].        [place] [facility] for Bariatric Surgery  Eat 3 nutrient-rich meals each day     Don't skip meals--it will cause you to overeat later in the day! Space meals 4-6 hours apart    Eat around the same times each day to develop a routine eating schedule    Avoid snacking unless physically hungry.   Planned snacks: 1-2 times per day and no more than 150 calories    Eating protein and fiber (vegetables/fruits/whole  grains) with meals helps you stay full     Choose foods with less than 10 grams of sugar and 5-10 grams of fat per serving to prevent excess calories and weight gain  Eat protein first    Protein helps with healing, maintaining muscle mass and good nutrition, and reducing hunger     For RNY Gastric Bypass, Sleeve Gastrectomy, and Duodenal Switch: aim for 60-80 grams of protein per day    Eat protein first, then veggies and the fruits or grains  Stop drinking 15-30 minutes before meals and wait 30-45 minutes after eating to drink    Drinking too soon before a meal may cause you to be too full to eat    Drinking too soon after you eat will cause the food to  wash  through your new stomach too quickly--leaving you hungry and likely to eat more    Eat S-[id number]    Take 20-30 minutes to eat each meal by taking small bites, chewing foods to applesauce consistency or 20-30 times before you swallow    Not chewing food properly can block the opening of your pouch--causing pain, nausea, vomiting    Eating foods too fast can delay those full signals and increase overeating   Slow down your eating by smaller plates/bowls, toddler utensils, putting your fork/spoon down between bites and not watching TV/computer during meals!  Make a list of activities to prevent boredom, stress and emotional eating    Go for a walk or lift weights while watching your favorite TV show    Talk to a co-worker or email/call a friend     Keep your hands and mind busy with woodworking, puzzles, knitting or painting your nails    Practice deep breathing or meditation  Drink 64 ounces of 0-Calorie drinks between meals     Water    Zero calorie Propel , Vitamin Water Zero  or [employer] Lif[employer]ater , Crystal Light , Sugar-Free Thomas-Aid , and other sugar-free lemonade or flavored alfonso    Decaffeinated, unsweetened coffee/ tea (1 cup or less per day)   Avoid Caffeine and Carbonation- NO STRAWS    Caffeine can irritate your new pouch, increase risk for ulcers,  and can increase your appetite      Carbonation can lead to increased bloating/gas and discomfort   Work up to a total of 30-60 minutes of physical activity most days of the week    Helps with losing weight and preventing weight regain after surgery     Do a combination of cardio (walking/water exercises/biking/swimming) and strength training  Take daily vitamin/mineral supplements after surgery    Daily use of vitamins/minerals is necessary for the rest of your life      Psychological Evaluation for Bariatric Surgery      Why do I need a psychological evaluation before bariatric surgery?     The psychologist's main purpose is to provide the support and education to ensure you have the most successful outcome after surgery.   Preparing for bariatric surgery often involves changing behavior patterns. Working on these changes before surgery allows you to achieve the best results after surgery as some of these behaviors have been entrenched for many years. In addition, your relationship with food may need to change. Psychologists are experts in behavior change and are there to guide and support you as you make these important changes.   A significant life change, like losing a lot of weight, may affect many areas of your life--self-concept, physical activity and relationships with others. Your psychologist will help you prepare to adjust to these changes in a healthy way.   If you have mental health symptoms, relationship struggles, or other challenges, your psychologist will suggest how to best address these concerns so that they do not interfere with your progress toward a healthier lifestyle.       Is the psychologist looking for reasons to say I can't have surgery?   The goal is to not find specific psychological problems. Instead, the psychologist will be working with your strengths to ensure you have the most optimal outcome after surgery.  There is no expectation that you will have everything figured out already  or that you must have  perfect  behaviors before moving forward with surgery. Your psychologist is expecting that you will have some behavior changes that will need to occur concurrent with the surgery.   You can feel comfortable that the psychologist is not there to    you or your habits. The psychologist is there to provide support and encourage your progress.      What should I expect during the evaluation?   During the interview, which could take place over 2 or more sessions, the psychologist will ask about:   -weight history, current eating pattern and fluid intake, and previous attempts at weight loss   -activity level   -psychiatric history  -drug, alcohol and nicotine use   -social and developmental history  -support system   -current stressors and coping mechanisms   -understanding of the risks of surgery   -expectations for outcomes after surgery   You will complete various questionnaires that will focus on coping strategies, eating behaviors, quality of life and adverse childhood experiences in order to provide additional information to inform the assessment.   Your psychologist will likely give you some  homework assignments  to practice as you prepare for surgery. This will be individually tailored to your specific needs.   Your psychologist will talk with you about some of the typical challenges that patients might encounter after surgery and how to prepare for these.   A final report will be generated and any treatment recommendations will be discussed with you and the bariatric team to determine next steps.   If you would like, you may have any support people (e.g., spouse) join a session of the evaluation to provide additional information.       Bariatric surgery offers a physical  tool  for weight management. Similarly, your work with the psychologist will provide you with some additional emotional and behavioral  tools  as you work toward your healthier lifestyle goals.      Support  Group    Support and accountability is an important part of your weight loss journey and maintenance once you reach your health goals.  Because of this, we require that you attend at least one of our support groups before you meet with the surgeon so you get a feel for what they are like and hopefully establish a connection to others who are going through a similar process or have had surgery before so you can ask your questions.    We currently have two options for support group but they are in the virtual format only at this time.  Both groups are using Microsoft Teams for their platform and you can access it through the web or an lynn that can be downloaded to a smart phone if you have one.      The Pre- and Post-op Connections Group is on the third Tuesday of the month from 6:30-8pm and is hosted by Inocencio Campa, PhD.  If you are interested in this group, you will need to email him at [email or web address] each month and then he will in turn send you the invitation to join.      We also have a Post-op focused Connections Group the 4th Wednesday of the month from 11am-12pm that is mostly geared toward post-operative patients who are past three months post-op.  This group is hosted by ELDON Rivera, CBN, CIC and you can email her to join the group at [email or web address] each month and she will send you an invite similar to Inocencio's group.  If you decide you would like to be a regular attender at this group, we can add you to an automatic invitation list of people.    You can see more information about available support groups that the [email or web address] [facility] offers to our patients as well by following the link:    The following Support Group information can also be found on our website:  [email or web address]      Please let us know if you have any questions about all the information above and we will be talking more about this during future visits.  "    Thank you,   [facility] Bariatric Team    Bariatric Task List    Fax:  Please fax all paperwork to: [phone] -     Status:  Is patient a candidate for bariatric surgery?:  patient is a candidate for bariatric surgery -     Cleared to schedule surgeon consult?:  not cleared to schedule surgeon consult -     Status:  surgery evaluation in process -     Surgeon: Kasia Rolle -        Insurance: Insurance:  [employer] -      Contact insurance to discuss coverage: Needed -       HP Referral:  Needed - HP phone calls set up by [place]   Other:  Call [place] at [phone] to discuss insurance requirements. -        Patient Info: Initial Weight:  415# -     Date of Initial Weight/Height:  1/25/2024 -     Goal Weight (lbs):  415 -     Required Weight Loss:  No gain from initial weight, any loss is great! -     Surgery Type:  duodenal switch -        Dietician Visits: Clearance from dietician to see surgeon?:  Needed - Denny      Psychological Evaluation: Psych eval:  Needed - Inocencio      Lab Work: Complete Blood Count:  Completed -     Comprehensive Metabolic Panel:  Completed -     Vitamin D:    -     PTH:  Completed -     Hgb A1c:  Completed - 5.5 on 1/25/2024     Ferritin: Completed -       Folate: Completed -       Testosterone, Total and Free: Completed -     Thiamine: Completed -     Vitamin A: Completed -     Vitamin B12: Completed -     Zinc: Completed -        Consults/ Clearance Sleep Medicine:  Completed - Uses Bipap--bring to the hospital on the day of surgery.   Medical Weight Management: Needed - Starting Tirzepatide.      Health Maintenance: Colonoscopy(> 50 yrs or family hx):  NOT Needed -        Stopping Smoking/ Alcohol Use/Cannabis Use: Quit tobacco use (3 months smoke free)?:  Completed - March 2023--never again!      Patient Education:  Information Session:  Needed - Must watch videos!   Given \"Making your decision\" handout?:  Yes -     Given \"A Roadmap to you Weight Loss Surgery\" " handout?: Yes -     Attended support group?:  Needed - Must attend at least once!!   Support plan in place?:  Completed - wife, parents, extended family and friends      Additional Surgery Requirements: Review Coag plan:  Completed - Standard   Final nicotine screen:  Needed - w/pre-op labs.   Gallstone prevention plan (Actigall for 6 months postop): Needed -        Final Tasks:  Before surgery online preop class:  Needed - Prior to surgery date.   After surgery online class:  Needed - 1 week after surgery w/dietitian   Nurse visit for information:    -     Weight Check:   -     History and Physical: Primary Care Provider -   Needed - Within 30 days of surgery   Final labs per clinic: Needed - Within 30 days of surgery   Chest xray per clinic: Needed - Within 90 days of surgery   Electrocardiogram (ECG) per clinic: Needed - Within 90 days of surgery   Schedule postop appointments: Needed - [place] to set up when scheduling surgery.

## 2024-01-25 ENCOUNTER — OFFICE VISIT (OUTPATIENT)
Dept: SURGERY | Facility: CLINIC | Age: 39
End: 2024-01-25
Payer: COMMERCIAL

## 2024-01-25 ENCOUNTER — LAB (OUTPATIENT)
Dept: LAB | Facility: CLINIC | Age: 39
End: 2024-01-25
Payer: COMMERCIAL

## 2024-01-25 VITALS
BODY MASS INDEX: 42.66 KG/M2 | WEIGHT: 315 LBS | SYSTOLIC BLOOD PRESSURE: 138 MMHG | DIASTOLIC BLOOD PRESSURE: 80 MMHG | HEIGHT: 72 IN

## 2024-01-25 DIAGNOSIS — E66.01 MORBID OBESITY WITH BMI OF 50.0-59.9, ADULT (H): Primary | ICD-10-CM

## 2024-01-25 DIAGNOSIS — I10 PRIMARY HYPERTENSION: ICD-10-CM

## 2024-01-25 DIAGNOSIS — E88.810 METABOLIC SYNDROME: ICD-10-CM

## 2024-01-25 DIAGNOSIS — G47.33 OBSTRUCTIVE SLEEP APNEA SYNDROME: ICD-10-CM

## 2024-01-25 DIAGNOSIS — E66.01 MORBID OBESITY WITH BMI OF 50.0-59.9, ADULT (H): ICD-10-CM

## 2024-01-25 DIAGNOSIS — E78.5 DYSLIPIDEMIA: ICD-10-CM

## 2024-01-25 DIAGNOSIS — I16.0 HYPERTENSIVE URGENCY: ICD-10-CM

## 2024-01-25 DIAGNOSIS — M25.562 CHRONIC PAIN OF BOTH KNEES: ICD-10-CM

## 2024-01-25 DIAGNOSIS — M25.561 CHRONIC PAIN OF BOTH KNEES: ICD-10-CM

## 2024-01-25 DIAGNOSIS — Z87.898 HISTORY OF ALCOHOL USE DISORDER: ICD-10-CM

## 2024-01-25 DIAGNOSIS — G89.29 CHRONIC PAIN OF BOTH KNEES: ICD-10-CM

## 2024-01-25 PROBLEM — M25.569 KNEE PAIN: Status: ACTIVE | Noted: 2024-01-25

## 2024-01-25 LAB
ERYTHROCYTE [DISTWIDTH] IN BLOOD BY AUTOMATED COUNT: 13.1 % (ref 10–15)
FOLATE SERPL-MCNC: 7.6 NG/ML (ref 4.6–34.8)
HBA1C MFR BLD: 5.5 % (ref 0–5.6)
HCT VFR BLD AUTO: 41.5 % (ref 40–53)
HGB BLD-MCNC: 14.6 G/DL (ref 13.3–17.7)
MCH RBC QN AUTO: 29.9 PG (ref 26.5–33)
MCHC RBC AUTO-ENTMCNC: 35.2 G/DL (ref 31.5–36.5)
MCV RBC AUTO: 85 FL (ref 78–100)
PLATELET # BLD AUTO: 392 10E3/UL (ref 150–450)
RBC # BLD AUTO: 4.88 10E6/UL (ref 4.4–5.9)
WBC # BLD AUTO: 8.4 10E3/UL (ref 4–11)

## 2024-01-25 PROCEDURE — 84425 ASSAY OF VITAMIN B-1: CPT | Mod: 90

## 2024-01-25 PROCEDURE — 83970 ASSAY OF PARATHORMONE: CPT

## 2024-01-25 PROCEDURE — 82746 ASSAY OF FOLIC ACID SERUM: CPT

## 2024-01-25 PROCEDURE — 84403 ASSAY OF TOTAL TESTOSTERONE: CPT

## 2024-01-25 PROCEDURE — 84590 ASSAY OF VITAMIN A: CPT | Mod: 90

## 2024-01-25 PROCEDURE — 82607 VITAMIN B-12: CPT

## 2024-01-25 PROCEDURE — 36415 COLL VENOUS BLD VENIPUNCTURE: CPT

## 2024-01-25 PROCEDURE — 82728 ASSAY OF FERRITIN: CPT

## 2024-01-25 PROCEDURE — 85027 COMPLETE CBC AUTOMATED: CPT

## 2024-01-25 PROCEDURE — 83036 HEMOGLOBIN GLYCOSYLATED A1C: CPT

## 2024-01-25 PROCEDURE — 99000 SPECIMEN HANDLING OFFICE-LAB: CPT

## 2024-01-25 PROCEDURE — 80053 COMPREHEN METABOLIC PANEL: CPT

## 2024-01-25 PROCEDURE — 84270 ASSAY OF SEX HORMONE GLOBUL: CPT

## 2024-01-25 PROCEDURE — 99215 OFFICE O/P EST HI 40 MIN: CPT | Performed by: FAMILY MEDICINE

## 2024-01-25 PROCEDURE — 84630 ASSAY OF ZINC: CPT | Mod: 90

## 2024-01-25 RX ORDER — ATENOLOL AND CHLORTHALIDONE TABLET 50; 25 MG/1; MG/1
1 TABLET ORAL DAILY
Qty: 90 TABLET | Refills: 4 | Status: SHIPPED | OUTPATIENT
Start: 2024-01-25

## 2024-01-25 NOTE — PROGRESS NOTES
"New Bariatric Surgery Consultation Note    2024    RE: Dk Akins  MR#: [id number]  : 1985      Referring provider: Dr. Peace      Chief Complaint/Reason for visit: evaluation for possible weight loss surgery    Dear Dr. Peace,    I had the pleasure of seeing your patient, Dk Akins, to evaluate his obesity and consider him for possible weight loss surgery. As you know, Dk Akins is 38 year old.  He has a height of 6' 0\", a weight of 415 lbs 0 oz, and calculated Body mass index is 56.28 kg/m . Nadir quit smoking marijuana and using alcohol in 2023. He subsequently gained significant weight to his highest weight today.         HISTORY OF PRESENT ILLNESS:      2024    12:59 PM   Weight Loss History Reviewed with Patient   What is the most that you have ever weighed 415   What is the most weight you have lost? 40   I have tried the following methods to lose weight Watching portions or calories    Exercise    Weight Watchers   I have tried the following weight loss medications? (Check all that apply) None       CO-MORBIDITIES OF OBESITY INCLUDE:      2024    12:59 PM   --   I have the following health issues associated with obesity High Blood Pressure    Sleep Apnea       PAST MEDICAL HISTORY:  Past Medical History:   Diagnosis Date    Anxiety     Dyslipidemia     Femur fracture (H)     x4    History of alcohol use disorder     Quit 2023    History of tobacco use     [age]    Humerus fracture 2000    Hypertension     Knee pain     Metabolic syndrome     LATOYA (obstructive sleep apnea)     Uses Bipap       PAST SURGICAL HISTORY:  Past Surgical History:   Procedure Laterality Date    HC OPEN TX BIMALLEOLAR ANKLE FX, INCL INTERNAL FIXATION      Description: Open Treatment Of Bimalleolar Ankle Fracture;  Recorded: 2012;    KNEE SURGERY         FAMILY HISTORY:   Family History   Problem Relation Age of Onset    Mental retardation Brother     Breast " Cancer Maternal Grandmother     Coronary Artery Disease Paternal Grandfather        SOCIAL HISTORY:       1/25/2024    12:59 PM   Social History Questions Reviewed With Patient   Which best describes your employment status (select all that apply) I work full-time   If you work, 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 wife, parents, extended family and friends   Who can you count on for support throughout your weight loss surgery journey? wife and family       HABITS:      1/25/2024    12:59 PM   --   How often do you drink alcohol? Never   Do you currently use any of the following Nicotine products? No   Have you ever used any of the following nicotine products? Cigars   If you previously used any of these products, what year did you quit? 2023   Have you or are you currently using street drugs or prescription strength medication for which you do not have a prescription for? No   Do you have a history of chemical dependency (alcohol or drug abuse)? No       PSYCHOLOGICAL HISTORY:       1/25/2024    12:59 PM   Psychological History Reviewed With Patient   Have you ever attempted suicide? Never.   Have you had thoughts of suicide in the past year? No   Have you ever been hospitalized for mental illness or a suicide attempt? Never.   Do you have a history of chronic pain? No   Have you ever been diagnosed with fibromyalgia? No   Are you currently being treated for any of the following? (select all that apply) I do not have a mental illness       ROS:      1/25/2024    12:59 PM   --   Skin None of the above   HEENT None of these   Musculoskeletal Joint Pain    Back pain   Cardiovascular Shortness of breath with activity    None of the above   Pulmonary Shortness of breath with activity    Snoring    Awaken from sleep to catch your breath    People  have told me I stop breathing while asleep   Gastrointestinal None of the above   Genitourinary None of the above   Hematological None of the above   Neurological None of the above       EATING BEHAVIORS:      1/25/2024    12:59 PM   --   Have you or anyone else thought that you had an eating disorder? No   Do you currently binge eat (eat a large amount of food in a short time)? No   Are you an emotional eater? No   Do you get up to eat after falling asleep? No   Can you afford 3 meals a day? Yes   Can you afford 50-60 dollars a month for vitamins? Yes       EXERCISE:      1/25/2024    12:59 PM   --   How often do you exercise? Never   What keeps you from being more active? Pain    I have just had surgery on one or more of my joints    I should be more active but I just have not gotten around to it       MEDICATIONS:  Current Outpatient Medications   Medication Sig Dispense Refill    atenolol-chlorthalidone (TENORETIC) 50-25 MG tablet Take 1 tablet by mouth daily 90 tablet 4    tirzepatide-Weight Management (ZEPBOUND) 10 MG/0.5ML prefilled pen Inject 0.5 mLs (10 mg) Subcutaneous every 7 days for 28 days 2 mL 0    tirzepatide-Weight Management (ZEPBOUND) 12.5 MG/0.5ML prefilled pen Inject 0.5 mLs (12.5 mg) Subcutaneous every 7 days for 28 days 2 mL 0    tirzepatide-Weight Management (ZEPBOUND) 15 MG/0.5ML prefilled pen Inject 0.5 mLs (15 mg) Subcutaneous every 7 days for 336 days 6 mL 3    tirzepatide-Weight Management (ZEPBOUND) 2.5 MG/0.5ML prefilled pen Inject 0.5 mLs (2.5 mg) Subcutaneous every 7 days for 28 days 2 mL 0    tirzepatide-Weight Management (ZEPBOUND) 5 MG/0.5ML prefilled pen Inject 0.5 mLs (5 mg) Subcutaneous every 7 days for 28 days 2 mL 0    tirzepatide-Weight Management (ZEPBOUND) 7.5 MG/0.5ML prefilled pen Inject 0.5 mLs (7.5 mg) Subcutaneous every 7 days for 28 days 2 mL 0       ALLERGIES:  No Known Allergies    LABS/IMAGING/MEDICAL RECORDS REVIEWED    PHYSICAL EXAM:  /80   Ht 1.829 m  "(6')   Wt (!) 188.2 kg (415 lb)   BMI 56.28 kg/m    Pleasant and in no distress  Neck 20.5\" Mallampati 3+  Heart regular  Lungs clear  Abdominal circumference 66\" Striae, no rashes  Extremities, scant FLAVIA  Gait unassisted  Euthymic      In summary, Nadir has LATOYA, HTN, Dyslipidemia, Elevated Glucose, Metabolic syndrome, and knee pain  in the setting of morbid obesity. His Body mass index is 56.28 kg/m . This satisfies NIH criteria for bariatric surgery. Once Nadir has been cleared by our bariatric psychologist and our bariatric dietitian, completed initial lab work, attended one support group, and satisfied insurance mandated visits if applicable, he  will be scheduled with Dr. Rolle for Bariatric Surgery Consultation. He is interested in the DOMINIQUE.  Nadir understands that routine health care maintenance will need to be up to date prior to surgery. he verbalizes understanding of the process to surgery and expectations for the postoperative period including the need for lifelong lifestyle changes, vitamin supplementation, and laboratory monitoring.       Weight will need to be 415# or as low as possible prior to submitting for insurance approval. We initiated Zepbound today as Wegovy and Saxenda unavailable  Standard DVT protocol  Ursodiol for 6 months after surgery to prevent gallstone formation  Sleep Study has been done. He will bring his BiPap to the hospital on the am of surgery  Smoking Cessation and Urine for tobacco metabolites pre-operatively. He quit smoking in March   Consider Naltrexone postoperatively      Sincerely,          Laurie Moreno MD     Total time spent on the date of this encounter doing: chart review, review of test results, patient visit, physical exam, education, counseling, developing plan of care, and documenting = 60 minutes.     "

## 2024-01-25 NOTE — LETTER
"    2024         RE: Dk Akins  [address] [place] [id number]        Dear Colleague,    Thank you for referring your patient, Dk Akins, to the [facility] [place]. Please see a copy of my visit note below.    New Bariatric Surgery Consultation Note    2024    RE: Dk Akins  MR#: [id number]  : 1985      Referring provider: Dr. Peace      Chief Complaint/Reason for visit: evaluation for possible weight loss surgery    Dear Dr. Peace,    I had the pleasure of seeing your patient, Dk Akins, to evaluate his obesity and consider him for possible weight loss surgery. As you know, Dk Akins is 38 year old.  He has a height of 6' 0\", a weight of 415 lbs 0 oz, and calculated Body mass index is 56.28 kg/m . Nadir quit smoking marijuana and using alcohol in 2023. He subsequently gained significant weight to his highest weight today.         HISTORY OF PRESENT ILLNESS:      2024    12:59 PM   Weight Loss History Reviewed with Patient   What is the most that you have ever weighed 415   What is the most weight you have lost? 40   I have tried the following methods to lose weight Watching portions or calories    Exercise    Weight Watchers   I have tried the following weight loss medications? (Check all that apply) None       CO-MORBIDITIES OF OBESITY INCLUDE:      2024    12:59 PM   --   I have the following health issues associated with obesity High Blood Pressure    Sleep Apnea       PAST MEDICAL HISTORY:  Past Medical History:   Diagnosis Date     Anxiety      Dyslipidemia      Femur fracture (H)     x4     History of alcohol use disorder     Quit 2023     History of tobacco use     19-26     Humerus fracture 2000     Hypertension      Knee pain      Metabolic syndrome      LATOYA (obstructive sleep apnea)     Uses Bipap       PAST SURGICAL HISTORY:  Past Surgical History:   Procedure " Laterality Date     HC OPEN TX BIMALLEOLAR ANKLE FX, INCL INTERNAL FIXATION      Description: Open Treatment Of Bimalleolar Ankle Fracture;  Recorded: 09/27/2012;     KNEE SURGERY         FAMILY HISTORY:   Family History   Problem Relation Age of Onset     Mental retardation Brother      Breast Cancer Maternal Grandmother      Coronary Artery Disease Paternal Grandfather        SOCIAL HISTORY:       1/25/2024    12:59 PM   Social History Questions Reviewed With Patient   Which best describes your employment status (select all that apply) I work full-time   If you work, 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 wife, parents, extended family and friends   Who can you count on for support throughout your weight loss surgery journey? wife and family       HABITS:      1/25/2024    12:59 PM   --   How often do you drink alcohol? Never   Do you currently use any of the following Nicotine products? No   Have you ever used any of the following nicotine products? Cigars   If you previously used any of these products, what year did you quit? 2023   Have you or are you currently using street drugs or prescription strength medication for which you do not have a prescription for? No   Do you have a history of chemical dependency (alcohol or drug abuse)? No       PSYCHOLOGICAL HISTORY:       1/25/2024    12:59 PM   Psychological History Reviewed With Patient   Have you ever attempted suicide? Never.   Have you had thoughts of suicide in the past year? No   Have you ever been hospitalized for mental illness or a suicide attempt? Never.   Do you have a history of chronic pain? No   Have you ever been diagnosed with fibromyalgia? No   Are you currently being treated for any of the following? (select all that apply) I do not have a mental illness       ROS:       1/25/2024    12:59 PM   --   Skin None of the above   HEENT None of these   Musculoskeletal Joint Pain    Back pain   Cardiovascular Shortness of breath with activity    None of the above   Pulmonary Shortness of breath with activity    Snoring    Awaken from sleep to catch your breath    People have told me I stop breathing while asleep   Gastrointestinal None of the above   Genitourinary None of the above   Hematological None of the above   Neurological None of the above       EATING BEHAVIORS:      1/25/2024    12:59 PM   --   Have you or anyone else thought that you had an eating disorder? No   Do you currently binge eat (eat a large amount of food in a short time)? No   Are you an emotional eater? No   Do you get up to eat after falling asleep? No   Can you afford 3 meals a day? Yes   Can you afford 50-60 dollars a month for vitamins? Yes       EXERCISE:      1/25/2024    12:59 PM   --   How often do you exercise? Never   What keeps you from being more active? Pain    I have just had surgery on one or more of my joints    I should be more active but I just have not gotten around to it       MEDICATIONS:  Current Outpatient Medications   Medication Sig Dispense Refill     atenolol-chlorthalidone (TENORETIC) 50-25 MG tablet Take 1 tablet by mouth daily 90 tablet 4     tirzepatide-Weight Management (ZEPBOUND) 10 MG/0.5ML prefilled pen Inject 0.5 mLs (10 mg) Subcutaneous every 7 days for 28 days 2 mL 0     tirzepatide-Weight Management (ZEPBOUND) 12.5 MG/0.5ML prefilled pen Inject 0.5 mLs (12.5 mg) Subcutaneous every 7 days for 28 days 2 mL 0     tirzepatide-Weight Management (ZEPBOUND) 15 MG/0.5ML prefilled pen Inject 0.5 mLs (15 mg) Subcutaneous every 7 days for 336 days 6 mL 3     tirzepatide-Weight Management (ZEPBOUND) 2.5 MG/0.5ML prefilled pen Inject 0.5 mLs (2.5 mg) Subcutaneous every 7 days for 28 days 2 mL 0     tirzepatide-Weight Management (ZEPBOUND) 5 MG/0.5ML prefilled pen Inject 0.5 mLs (5 mg)  "Subcutaneous every 7 days for 28 days 2 mL 0     tirzepatide-Weight Management (ZEPBOUND) 7.5 MG/0.5ML prefilled pen Inject 0.5 mLs (7.5 mg) Subcutaneous every 7 days for 28 days 2 mL 0       ALLERGIES:  No Known Allergies    LABS/IMAGING/MEDICAL RECORDS REVIEWED    PHYSICAL EXAM:  /80   Ht 1.829 m (6')   Wt (!) 188.2 kg (415 lb)   BMI 56.28 kg/m    Pleasant and in no distress  Neck 20.5\" Mallampati 3+  Heart regular  Lungs clear  Abdominal circumference 66\" Striae, no rashes  Extremities, scant FLAVIA  Gait unassisted  Euthymic      In summary, Nadir has LATOYA, HTN, Dyslipidemia, Elevated Glucose, Metabolic syndrome, and knee pain  in the setting of morbid obesity. His Body mass index is 56.28 kg/m . This satisfies NIH criteria for bariatric surgery. Once Nadir has been cleared by our bariatric psychologist and our bariatric dietitian, completed initial lab work, attended one support group, and satisfied insurance mandated visits if applicable, he  will be scheduled with Dr. Rolle for Bariatric Surgery Consultation. He is interested in the DOMINIQUE.  Nadir understands that routine health care maintenance will need to be up to date prior to surgery. he verbalizes understanding of the process to surgery and expectations for the postoperative period including the need for lifelong lifestyle changes, vitamin supplementation, and laboratory monitoring.       Weight will need to be 415# or as low as possible prior to submitting for insurance approval. We initiated Zepbound today as Wegovy and Saxenda unavailable  Standard DVT protocol  Ursodiol for 6 months after surgery to prevent gallstone formation  Sleep Study has been done. He will bring his BiPap to the hospital on the am of surgery  Smoking Cessation and Urine for tobacco metabolites pre-operatively. He quit smoking in March   Consider Naltrexone postoperatively      Sincerely,          Laurie Moreno MD     Total time spent on the date of this encounter doing: " chart review, review of test results, patient visit, physical exam, education, counseling, developing plan of care, and documenting = 60 minutes.       Again, thank you for allowing me to participate in the care of your patient.        Sincerely,        Laurie Moreno MD

## 2024-01-26 ENCOUNTER — TELEPHONE (OUTPATIENT)
Dept: SURGERY | Facility: CLINIC | Age: 39
End: 2024-01-26
Payer: COMMERCIAL

## 2024-01-26 LAB
ALBUMIN SERPL BCG-MCNC: 4.4 G/DL (ref 3.5–5.2)
ALP SERPL-CCNC: 61 U/L (ref 40–150)
ALT SERPL W P-5'-P-CCNC: 47 U/L (ref 0–70)
ANION GAP SERPL CALCULATED.3IONS-SCNC: 12 MMOL/L (ref 7–15)
AST SERPL W P-5'-P-CCNC: 29 U/L (ref 0–45)
BILIRUB SERPL-MCNC: 0.6 MG/DL
BUN SERPL-MCNC: 16.6 MG/DL (ref 6–20)
CALCIUM SERPL-MCNC: 9.6 MG/DL (ref 8.6–10)
CHLORIDE SERPL-SCNC: 104 MMOL/L (ref 98–107)
CREAT SERPL-MCNC: 0.86 MG/DL (ref 0.67–1.17)
DEPRECATED HCO3 PLAS-SCNC: 26 MMOL/L (ref 22–29)
EGFRCR SERPLBLD CKD-EPI 2021: >90 ML/MIN/1.73M2
FERRITIN SERPL-MCNC: 217 NG/ML (ref 31–409)
GLUCOSE SERPL-MCNC: 105 MG/DL (ref 70–99)
POTASSIUM SERPL-SCNC: 3.8 MMOL/L (ref 3.4–5.3)
PROT SERPL-MCNC: 7.7 G/DL (ref 6.4–8.3)
PTH-INTACT SERPL-MCNC: 38 PG/ML (ref 15–65)
SHBG SERPL-SCNC: 27 NMOL/L (ref 11–80)
SODIUM SERPL-SCNC: 142 MMOL/L (ref 135–145)
VIT B12 SERPL-MCNC: 596 PG/ML (ref 232–1245)

## 2024-01-26 NOTE — TELEPHONE ENCOUNTER
VELMA montalvo.     Beverly Olivas RN, CBN  [facility]  P [phone]  F [phone]

## 2024-01-26 NOTE — TELEPHONE ENCOUNTER
I have spoken with Nadir to follow up with him after his visit with Dr. Moreno yesterday.    I have submitted a referral to  for him to participate in the phone program for surgery.  Needs to be cleared by RD and Psych, something new this year is that he has to show that he's exercise plan.  He will email Inocencio to get a meeting invite for support group

## 2024-01-27 LAB — ZINC SERPL-MCNC: 86 UG/DL

## 2024-01-28 LAB
ANNOTATION COMMENT IMP: NORMAL
RETINYL PALMITATE SERPL-MCNC: <0.02 MG/L
VIT A SERPL-MCNC: 0.56 MG/L

## 2024-01-29 LAB — VIT B1 PYROPHOSHATE BLD-SCNC: 149 NMOL/L

## 2024-01-31 LAB
TESTOST FREE SERPL-MCNC: 3.67 NG/DL
TESTOST SERPL-MCNC: 174 NG/DL (ref 240–950)

## 2024-02-02 ENCOUNTER — TELEPHONE (OUTPATIENT)
Dept: SURGERY | Facility: CLINIC | Age: 39
End: 2024-02-02
Payer: COMMERCIAL

## 2024-02-02 NOTE — TELEPHONE ENCOUNTER
Reason for Call:  Other prescription    Detailed comments: Patient calling to check on prior auth for Zepbound    Phone Number Patient can be reached at: Cell number on file:    Telephone Information:   Mobile [phone]       Best Time: any    Can we leave a detailed message on this number? YES    Call taken on 2/2/2024 at 4:13 PM by Missy Salazar

## 2024-02-05 NOTE — TELEPHONE ENCOUNTER
Left pt a message letting him know that a PA is in process.    Beverly Olivas RN, CBN  [facility]  P [phone]  F [phone]

## 2024-02-07 NOTE — TELEPHONE ENCOUNTER
PA Initiation    Medication: ZEPBOUND 2.5 MG/0.5ML SC SOAJ  Insurance Company: [employer] - Phone [phone] Fax [phone]  [facility] the Rx[facility] [place] [address]  Filling Pharmacy Phone: [phone]  Filling Pharmacy Fax:    Start Date: 2/7/2024

## 2024-02-08 NOTE — TELEPHONE ENCOUNTER
Retail Pharmacy Prior Authorization Team   Phone: [phone]    PLEASE SEE NOTE BELOW FROM INSURANCE - THEY ARE STATING THAT PT HAS TO TRY/FAIL ONE OF THESE THREE MEDS BEFORE ZEPBOUND WILL BE APPROVE: QSYMIA, SAXENDA, WEGOVY. WE KNOW THAT THE LATTER TWO MEDS ARE ON BACK ORDER, BUT LOOKS LIEK QSYMIA IS AVAILABLE NOW?     WOULD YOU LIKE TO PRESCRIBE FOR QSYMIA FOR THE TIME BEING? IF WE RESUBMIT THE PA WE'LL NEED SUPPORTING DOCUMENTATION TO GO ALONG WITH IT - SEE BELOW.     IF YOU END UP PRESCRIBING QSYMIA THEN OK TO CLOSE THIS ENCOUNTER THANK YOU.

## 2024-02-09 ENCOUNTER — TELEPHONE (OUTPATIENT)
Dept: SURGERY | Facility: CLINIC | Age: 39
End: 2024-02-09

## 2024-02-09 ENCOUNTER — TELEPHONE (OUTPATIENT)
Dept: SURGERY | Facility: CLINIC | Age: 39
End: 2024-02-09
Payer: COMMERCIAL

## 2024-02-09 DIAGNOSIS — E66.01 MORBID OBESITY WITH BMI OF 50.0-59.9, ADULT (H): Primary | ICD-10-CM

## 2024-02-09 RX ORDER — PHENTERMINE AND TOPIRAMATE 3.75; 23 MG/1; MG/1
1 CAPSULE, EXTENDED RELEASE ORAL
Qty: 90 CAPSULE | Refills: 3 | Status: SHIPPED | OUTPATIENT
Start: 2024-02-09 | End: 2024-08-29

## 2024-02-09 NOTE — TELEPHONE ENCOUNTER
Reason for Call:  Other prescription    Detailed comments: Patient calling re: PA for Zepbound, please advise.    Phone Number Patient can be reached at: Cell number on file:    Telephone Information:   Mobile [phone]       Best Time: any    Can we leave a detailed message on this number? YES    Call taken on 2/9/2024 at 4:05 PM by Missy Salazar

## 2024-02-09 NOTE — TELEPHONE ENCOUNTER
Prior Authorization Retail Medication Request    Medication/Dose: Phentermine-Topiramate (QSYMIA) 3.75-23 MG CP24   Diagnosis and ICD code (if different than what is on RX):  Morbid obesity with BMI of 50.0-59.9, adult (H) [E66.01, Z68.43]   New/renewal/insurance change PA/secondary ins. PA:  Previously Tried and Failed:    Rationale:      Insurance   Primary: [employer]   Insurance ID:  [id number]     Secondary (if applicable):  Insurance ID:      Pharmacy Information (if different than what is on RX)  Name:  [facility], [place] - [place] - [address]   Phone:  [phone]   Fax:

## 2024-02-09 NOTE — TELEPHONE ENCOUNTER
Let pt know that Zepbound was denied and sent to  for next steps. Pt verbalized understanding.    Beverly Olivas RN, CBN  [facility]  P [phone]  F [phone]

## 2024-02-21 ENCOUNTER — VIRTUAL VISIT (OUTPATIENT)
Dept: SURGERY | Facility: CLINIC | Age: 39
End: 2024-02-21
Payer: COMMERCIAL

## 2024-02-21 DIAGNOSIS — E66.01 MORBID OBESITY (H): Primary | ICD-10-CM

## 2024-02-21 NOTE — PROGRESS NOTES
Virtual Visit Details    Type of service:  Video Visit     Originating Location (pt. Location): Other car    Distant Location (provider location):  Off-site  Platform used for Video Visit: Shona    Start Time: 3:27 PM  End Time: 4:30 PM    Nadir would like to follow through with bariatric surgery for health reasons. He has struggled with his weight for much of his life and now is concerned about various comorbidities. He has a history of alcohol dependence and was using cannabis regularly, but has been sober since March 17, 2023. He also has a history of depression, but no recent symptoms. He has good knowledge of surgery and good support. He will follow up and complete psychological testing. F50.9; alcohol use disorder, moderate-severe in full sustained remission; E66.01

## 2024-02-24 NOTE — TELEPHONE ENCOUNTER
Central Prior Authorization Team   Phone: [phone]    PA Initiation    Medication: Phentermine-Topiramate (QSYMIA) 3.75-23 MG CP24   Insurance Company: [employer] - Phone [phone] Fax [phone]  Pharmacy Filling the Rx: [facility], [place] [address] [facility]  Filling Pharmacy Phone: [phone]  Filling Pharmacy Fax:    Start Date: 2/23/2024

## 2024-02-26 NOTE — TELEPHONE ENCOUNTER
Prior Authorization Approval    Authorization Effective Date: 2/23/2024  Authorization Expiration Date: 5/23/2024  Medication: Phentermine-Topiramate (QSYMIA) 3.75-23 MG CP24 -PA APPROVED   Approved Dose/Quantity:   Reference #:     Insurance Company: [employer] - Phone [phone] Fax [phone]  Expected CoPay:       CoPay Card Available:      Foundation Assistance Needed:    Which Pharmacy is filling the prescription (Not needed for infusion/clinic administered): [facility], [place] - [address]  Pharmacy Notified:  Yes- **Instructed pharmacy to notify patient when script is ready to /ship.**  Patient Notified:  Yes

## 2024-03-27 ENCOUNTER — VIRTUAL VISIT (OUTPATIENT)
Dept: SURGERY | Facility: CLINIC | Age: 39
End: 2024-03-27
Payer: COMMERCIAL

## 2024-03-27 ENCOUNTER — TELEPHONE (OUTPATIENT)
Dept: SURGERY | Facility: CLINIC | Age: 39
End: 2024-03-27

## 2024-03-27 DIAGNOSIS — E66.01 MORBID OBESITY (H): Primary | ICD-10-CM

## 2024-03-27 NOTE — TELEPHONE ENCOUNTER
General Call    Contacts         Type Contact Phone/Fax    03/27/2024 09:23 AM CDT Phone (Incoming) Nadir Akins (Self) [phone] (M)          Reason for Call:  Issa Zambrano, Can you call or herrera olea.. he's looking for a questionnaire to fill out before his psych visit today.        Could we send this information to you in Whisher or would you prefer to receive a phone call?:   Patient would like to be contacted via Whisher

## 2024-03-27 NOTE — PROGRESS NOTES
Virtual Visit Details    Type of service:  Video Visit     Originating Location (pt. Location): Other car    Distant Location (provider location):  Off-site  Platform used for Video Visit: LYSOGENE    Start Time: 3:25 PM  End time: 4:10 PM    Nadir has made quality changes in eating and lifestyle, but still needs to be more mindful about his eating. He will follow up with mindful eating strategies. He also needs to complete all of the psychological testing. F50.9; alcohol use disorder, moderate-severe in full sustained remission; E66.01

## 2024-04-10 ENCOUNTER — PATIENT OUTREACH (OUTPATIENT)
Dept: CARE COORDINATION | Facility: CLINIC | Age: 39
End: 2024-04-10
Payer: COMMERCIAL

## 2024-04-19 ENCOUNTER — VIRTUAL VISIT (OUTPATIENT)
Dept: SURGERY | Facility: CLINIC | Age: 39
End: 2024-04-19
Payer: COMMERCIAL

## 2024-04-19 DIAGNOSIS — Z71.3 NUTRITIONAL COUNSELING: ICD-10-CM

## 2024-04-19 DIAGNOSIS — E66.01 MORBID OBESITY WITH BMI OF 50.0-59.9, ADULT (H): Primary | ICD-10-CM

## 2024-04-19 PROCEDURE — 97802 MEDICAL NUTRITION INDIV IN: CPT | Mod: 95

## 2024-04-19 NOTE — LETTER
4/19/2024         RE: Dk Akins  [address] [place] [id number]        Dear Colleague,    Thank you for referring your patient, Dk Akins, to the [facility] [place]. Please see a copy of my visit note below.    Dk Akins is a 38 year old who is being evaluated via a billable video visit.      How would you like to obtain your AVS? MyChart  If the video visit is dropped, the invitation should be resent by: Text to cell phone: [phone]  Will anyone else be joining your video visit? No        Initial Structured Weight Loss Supervised Diet Evaluation     Assessment:  Dk is being seen today for initial RD nutritional evaluation. Patient has been unsuccessful with non-surgical weight loss methods and is interested in bariatric surgery. Today we reviewed current eating habits and level of physical activity, and instructed on the changes that are required for successful bariatric outcomes.      Surgery of interest per pt: DS.  Weight loss medications: Qsymia    Workflow review:  Support Group: Not completed.  Psychology:In progress.  Lab work:Completed.  SWL:No   HP phone visits - completed       Weight goal: At or below initial.    Anthropometrics:  Pt's weight is 394 lbs  Initial weight: 415 lbs  Weight change: 21 lbs down   BMI: 53.44  Ideal body weight: 77.6 kg (171 lb 1.2 oz)  Adjusted ideal body weight: 121.9 kg (268 lb 10.3 oz)    Medical History:  Patient Active Problem List   Diagnosis     Closed Fracture Of The Patella     Bone cyst     Primary hypertension     Morbid obesity (H)     Obstructive sleep apnea syndrome     Alcohol consumption binge drinking     Alcohol dependence with unspecified alcohol-induced disorder (H)     Dyslipidemia     Metabolic syndrome     History of alcohol use disorder     Knee pain        Nutrition History  Food allergies/intolerances/cultural or religous food customs: No     Diet history: Recently  cut out soda intake and fast food. Down 21lbs since initial visit with Dr. Moreno. Does not always have time for lunch. Has been making mindful choices when grocery shopping with his wife.     Vitamins/Mineral currently taking: none    Socioeconomic Status  Who does the grocery shopping for your household? Self and wife   Where do you grocery shop?: [employer], [employer], [employer], [employer]  Who prepares your meals at home? Self and wife (mostly self)     Diet Recall/Time  Breakfast: 2-3 eggs sometimes with jayesh Or beef stick 2-3  Lunch: x2 Chomps beef stick and granola bar   Dinner: Protein (chicken, beef, pork) with vegetable and occasional noodles or fruit      Typical Snacks: does not snack     Overnight eating: No    Eating out: limited- maybe  2x per month    Beverages  Water (120 oz+)  Coffee occasionally at work   [place] energy drink squeeze     Exercise  Getting about 9-20k steps in per day. Has a physical job. Trying to walk 2-3x per week with his dogs (1 mile)    Nutrition Diagnosis (PES statement)    Morbid obesity related to excessive calorie intake as evidenced by Intake of high caloric density foods/beverages (juice, soda, alcohol) at meals and/or snacks; large portions; frequent grazing; Estimated intake that exceeds estimated daily energy intake; Binge eating patterns; Frequent excessive fast food or restaurant intake; and BMI 53.44         Intervention    Nutrition Education:   Provided general overview of diet and lifestyle modifications needed to be a deemed a safe candidate for bariatric surgery.   Educated patient on how to read a food label: choosing foods with than 10 grams fat and 10 grams sugar per serving to avoid dumping syndrome.  Dumping Syndrome: Described the mechanisms of syndrome, symptoms, and prevention tools from a dietary perspective.   Vitamins: Educated on post-op vitamin regimen including MVI+ 18 mg Fe two times a day, calcium citrate 400-600 mg two times a day, 6917-0609 mcg sublingual B12 daily,  5000 IU vitamin D3 daily.     Food/Nutrient Delivery:  Educated patient on eating three meals, with cutting out snacking.  Bariatric Plate: Patient and I discussed the importance of including a lean protein source (20-30 grams/meal), vegetables (included at lunch and dinner), one serving (15g) of carbohydrate, and limited added fat (1 tb/day) at each meal.   Educated patient on how to complete a food journal and benefits of meal planning.   Educated patient on using a protein powder drink as a meal replacement and/or supplement after bariatric surgery.   Discussed importance of adequate hydration after surgery, with goal of at least 64 oz of fluids/day.  Addressed avoiding all carbonated, caffeinated and sweetened drinks to prepare for bariatric surgery.     Nutrition Counseling:  Mindful eating techniques: Encouraged slow meal pace, chewing foods to applesauce consistency for 20-30 minutes/meal.   Discussed  fluids 30 minutes before, during, and after meal to prevent dumping syndrome and discomfort post bariatric surgery.   Discussed pre/post operative diet progression, post op vitamin regimen, gave review of surgery process.     Instructions/Goals:     Start implementing bariatric surgery lifestyle modifications.  Fill out food journal and return at follow up.    Handouts Provided:    [employer] [facility] Nutrition Info      Monitor/Evaluation:  Pt. s target weight: no gain from initial visit, patient verbalized understanding.     Plan for next visit:   (Final Supervised Diet visit with RD) pre/post-op  diet progression, give review of surgery process.      Video-Visit Details    Type of service:  Video Visit    Video Start Time (time video started): 3:33 PM    Video End Time (time video stopped): 4:27 PM    Originating Location (pt. Location): Home      Distant Location (provider location):  Off-site    Mode of Communication:  Video Conference via [employer]    Physician has received  verbal consent for a Video Visit from the patient? Yes      Susana Varner RD         Again, thank you for allowing me to participate in the care of your patient.        Sincerely,        Susana Varner RD

## 2024-04-19 NOTE — PROGRESS NOTES
Dk Akins is a 38 year old who is being evaluated via a billable video visit.      How would you like to obtain your AVS? MyChart  If the video visit is dropped, the invitation should be resent by: Text to cell phone: [phone]  Will anyone else be joining your video visit? No        Initial Structured Weight Loss Supervised Diet Evaluation     Assessment:  Dk is being seen today for initial RD nutritional evaluation. Patient has been unsuccessful with non-surgical weight loss methods and is interested in bariatric surgery. Today we reviewed current eating habits and level of physical activity, and instructed on the changes that are required for successful bariatric outcomes.      Surgery of interest per pt: DS.  Weight loss medications: Qsymia    Workflow review:  Support Group: Not completed.  Psychology:In progress.  Lab work:Completed.  SWL:No   HP phone visits - completed       Weight goal: At or below initial.    Anthropometrics:  Pt's weight is 394 lbs  Initial weight: 415 lbs  Weight change: 21 lbs down   BMI: 53.44  Ideal body weight: 77.6 kg (171 lb 1.2 oz)  Adjusted ideal body weight: 121.9 kg (268 lb 10.3 oz)    Medical History:  Patient Active Problem List   Diagnosis    Closed Fracture Of The Patella    Bone cyst    Primary hypertension    Morbid obesity (H)    Obstructive sleep apnea syndrome    Alcohol consumption binge drinking    Alcohol dependence with unspecified alcohol-induced disorder (H)    Dyslipidemia    Metabolic syndrome    History of alcohol use disorder    Knee pain        Nutrition History  Food allergies/intolerances/cultural or religous food customs: No     Diet history: Recently cut out soda intake and fast food. Down 21lbs since initial visit with Dr. Moreno. Does not always have time for lunch. Has been making mindful choices when grocery shopping with his wife.     Vitamins/Mineral currently taking: none    Socioeconomic Status  Who does the grocery shopping for  your household? Self and wife   Where do you grocery shop?: [employer], [employer], [employer], [employer]  Who prepares your meals at home? Self and wife (mostly self)     Diet Recall/Time  Breakfast: 2-3 eggs sometimes with jayesh Or beef stick 2-3  Lunch: x2 Chomps beef stick and granola bar   Dinner: Protein (chicken, beef, pork) with vegetable and occasional noodles or fruit      Typical Snacks: does not snack     Overnight eating: No    Eating out: limited- maybe  2x per month    Beverages  Water (120 oz+)  Coffee occasionally at work   [place] energy drink squeeze     Exercise  Getting about 9-20k steps in per day. Has a physical job. Trying to walk 2-3x per week with his dogs (1 mile)    Nutrition Diagnosis (PES statement)    Morbid obesity related to excessive calorie intake as evidenced by Intake of high caloric density foods/beverages (juice, soda, alcohol) at meals and/or snacks; large portions; frequent grazing; Estimated intake that exceeds estimated daily energy intake; Binge eating patterns; Frequent excessive fast food or restaurant intake; and BMI 53.44         Intervention    Nutrition Education:   Provided general overview of diet and lifestyle modifications needed to be a deemed a safe candidate for bariatric surgery.   Educated patient on how to read a food label: choosing foods with than 10 grams fat and 10 grams sugar per serving to avoid dumping syndrome.  Dumping Syndrome: Described the mechanisms of syndrome, symptoms, and prevention tools from a dietary perspective.   Vitamins: Educated on post-op vitamin regimen including MVI+ 18 mg Fe two times a day, calcium citrate 400-600 mg two times a day, 0144-3420 mcg sublingual B12 daily, 5000 IU vitamin D3 daily.     Food/Nutrient Delivery:  Educated patient on eating three meals, with cutting out snacking.  Bariatric Plate: Patient and I discussed the importance of including a lean protein source (20-30 grams/meal), vegetables (included at lunch and dinner), one serving  (15g) of carbohydrate, and limited added fat (1 tb/day) at each meal.   Educated patient on how to complete a food journal and benefits of meal planning.   Educated patient on using a protein powder drink as a meal replacement and/or supplement after bariatric surgery.   Discussed importance of adequate hydration after surgery, with goal of at least 64 oz of fluids/day.  Addressed avoiding all carbonated, caffeinated and sweetened drinks to prepare for bariatric surgery.     Nutrition Counseling:  Mindful eating techniques: Encouraged slow meal pace, chewing foods to applesauce consistency for 20-30 minutes/meal.   Discussed  fluids 30 minutes before, during, and after meal to prevent dumping syndrome and discomfort post bariatric surgery.   Discussed pre/post operative diet progression, post op vitamin regimen, gave review of surgery process.     Instructions/Goals:     Start implementing bariatric surgery lifestyle modifications.  Fill out food journal and return at follow up.    Handouts Provided:   [employer] [facility] B[facility]ric [facility] Nutrition Info      Monitor/Evaluation:  Pt. s target weight: no gain from initial visit, patient verbalized understanding.     Plan for next visit:   (Final Supervised Diet visit with RD) pre/post-op  diet progression, give review of surgery process.      Video-Visit Details    Type of service:  Video Visit    Video Start Time (time video started): 3:33 PM    Video End Time (time video stopped): 4:27 PM    Originating Location (pt. Location): Home      Distant Location (provider location):  Off-site    Mode of Communication:  Video Conference via [facility]    Physician has received verbal consent for a Video Visit from the patient? Yes      Susana Varner RD      Complex Repair And Z Plasty Text: The defect edges were debeveled with a #15 scalpel blade.  The primary defect was closed partially with a complex linear closure.  Given the location of the remaining defect, shape of the defect and the proximity to free margins a Z plasty was deemed most appropriate for complete closure of the defect.  Using a sterile surgical marker, an appropriate advancement flap was drawn incorporating the defect and placing the expected incisions within the relaxed skin tension lines where possible.    The area thus outlined was incised deep to adipose tissue with a #15 scalpel blade.  The skin margins were undermined to an appropriate distance in all directions utilizing iris scissors.

## 2024-04-19 NOTE — PATIENT INSTRUCTIONS
Goals:  2,300-2,600 calories per day   110 - 130 grams of protein per day       Apps:  Myfitness pal  Cronometer  Fooducate   My Net diary        [facility] Support Groups    Connections: Bariatric Care Support Group?  This is open to all [facility] (and those external to this program) pre- and post- operative bariatric surgery patients as well as their support system.   WHEN: This group meets the 3rd Tuesday of each month from 6:30 PM - 8:00 PM virtually using [employer] Teams.   FACILITATOR: Led by Inocencio Campa, Ph.D who is a Licensed Psychologist with the [facility].   TO REGISTER: Please send an email to Inocencio Campa, Ph.D., LP at [email or web address]?if you would like an invitation to the group and to learn about using [employer] Teams.      PREPARING FOR WEIGHT-LOSS SURGERY    Lifestyle Changes Before and After Weight Loss Surgery: Keys for Success     Diet Guidelines after Weight Loss Surgery     Portion Control Without Measuring       Create a Plate    My Plate    Supplements after Sleeve Gastrectomy, Gastric Bypass or Single Anastomosis Duodenal Switch    Supplements after Duodenal Switch Surgery       Lean Protein Sources    Protein Source Portion Calories Grams of Protein                           Nonfat, plain Greek yogurt    (10 grams sugar or less) 3/4 cup (6 oz)  12-17   Light Yogurt (10 grams sugar or less) 3/4 cup (6 oz)  6-8   Protein Shake 1 shake 110-180 15-30   Skim/1% Milk or lactose-free milk 1 cup ( 8 oz)  8   Plain or light, flavored soymilk 1 cup  7-8   Plain or light, hemp milk 1 cup 110 6   Fat Free or 1% Cottage Cheese 1/2 cup 90 15   Part skim ricotta cheese 1/2 cup 100 14   Part skim or reduced fat cheese slices 1/4cup, 3 dice 65-80 8     Mozzarella String Cheese 1 80 8   Canned tuna, chicken, crab or salmon  (canned in water)  1/2 cup 100 15-20   White fish  (broiled, grilled, baked) 3 ounces 100 21   [place]/Tuna (broiled, grilled, baked) 3 ounces 150-180 21   Shrimp, Scallops, Lobster, Crab 3 ounces 100 21   Pork loin, Pork Tenderloin 3 ounces 150 21   Boneless, skinless chicken /turkey breast                          (broiled, grilled, baked) 3 ounces 120 21   [place], [place], [place], and Venison 3 ounces 120 21   Lean cuts of red meat and pork (sirloin,   round, tenderloin, flank, ground 93%-96%) 3 ounces 170 21   Lean or Extra Lean Ground Turkey 1/2 cup 150 20   90-95% Lean [place] Burger 1 jaleel 140-180 21   Low-fat casserole with lean meat 3/4 cup 200 17   Luncheon Meats                                                        (turkey, lean ham, roast beef, chicken) 3 ounces    100 21   Egg (boiled, poached, scrambled) 1 Egg 60 7   Egg Substitute 1/2 cup 70 10   Nuts (limit to 1 serving per day)  3 Tbsp. 150 7   Nut [place] (peanut, almond)  Limit to 1 serving or less daily 1 Tbsp. 90 4   Soy Burger (varies) 1  10-15   Garbanzo, Black, Tai Beans/Edamame 1/2 cup 110 7-9   Refried Beans 1/2 cup 100 7   Kidney and Lima beans 1/2 cup 110 7   Tempeh 3 oz 175 18   Vegan crumbles 1/2 cup 100 14   Tofu 1/2 cup 110 14   Chili (beans and extra lean beef or turkey) 1 cup 200 23   Lentils 1/2 cup 115 9   Black Bean Soup 1 cup 175 12        Carbohydrates    Carbohydrates fuel your body with glucose (sugar)--the energy your body needs so you can do your daily activities.  Carbohydrates offer an immediate source of energy for your body. They provide the fuel for your muscles and organs, such as your brain.   Types of Carbohydrates     Complex Carbohydrates are higher in fiber and keep you feeling full longer--helping you eat less.   These are found in nearly all plant-based foods and usually take longer for the body to digest.  They are most commonly found in whole-wheat bread, whole-grain pasta, brown rice, starchy vegetables,   and fruits  Refined Carbohydrates require almost  NO WORK for digestion and break down into glucose more quickly   than complex carbohydrates. Refined carbohydrates are usually high in calories and low in nutrients and fiber--  eating more of these can lead to weight gain.    Thinking about eliminating carbohydrates???  If you do not eat enough carbohydrates, the following can occur:  Fatigue  Muscle cramps  Poor mental function  Fatigue easily results from deprivation of carbohydrates, which is seen in people who fast, possibly interfering with activities of daily living.    If you do not eat enough carbohydrates, the following can occur:  Fatigue  Muscle cramps  Poor mental function    Fatigue easily results from deprivation of carbohydrates, which is seen in people who fast, possibly interfering with activities of daily living    Carbohydrates are your body's first choice for fuel. If given a choice of several types of foods simultaneously, your body will use the energy from carbohydrates first.    What foods contain carbohydrates?  Choose the following foods containing carbohydrates (the BEST ones to eat):   Fruit-fresh, frozen, canned in their own juices  Whole grains:  Whole-wheat breads  Brown rice  Oatmeal  Whole-grain cereals  Other starchy foods containing a minimum of 3 grams (g) fiber/100 calories  The ingredient label should list whole wheat or whole grain as one of the first ingredients (bulgur, quinoa, buckwheat, millet, spelt, faro, kasha)  Milk or yogurt (a natural source of carbohydrates):  Low-fat milk  Fat-free milk  Yogurt   Beans or legumes   Starchy vegetables, raw or frozen:  Potatoes  Peas  Corn    AVOID or limit the following foods containing carbohydrates:  Refined sugars, such as in:  Candy  Desserts-ice cream, cakes, pies, brownies, frozen yogurt, sherbet/sorbet  Cookies  White flour: bread/pasta/crackers/rice/tortillas  Sugary snacks: sweetened cereal, granola bars, cereal bars, donuts, muffins, bagels  Sugary Drinks:  Fruit Juice,  Smoothies  Sports Drinks  Regular Soda    What are typical serving sizes or portions?  The following are some serving and portion sizes for foods containing carbohydrates:  One medium piece of fruit, about 4?5 ounces (oz) (-tennis ball)  1 cup (C) berries or melon    C canned fruit    C juice (100% vegetable)    C starchy vegetables, cooked or chopped  One slice whole-grain bread  ? C brown rice, quinoa, buckwheat, millet, spelt, faro, kasha    C oatmeal (dry)    C bulgur  One small tortilla (less than 6inch diameter)    C wheat germ  1 oz pretzels     C flaked cereal

## 2024-04-24 ENCOUNTER — PATIENT OUTREACH (OUTPATIENT)
Dept: CARE COORDINATION | Facility: CLINIC | Age: 39
End: 2024-04-24
Payer: COMMERCIAL

## 2024-05-09 ENCOUNTER — VIRTUAL VISIT (OUTPATIENT)
Dept: SURGERY | Facility: CLINIC | Age: 39
End: 2024-05-09
Payer: COMMERCIAL

## 2024-05-09 DIAGNOSIS — E66.01 MORBID OBESITY (H): Primary | ICD-10-CM

## 2024-05-09 NOTE — PROGRESS NOTES
Virtual Visit Details    Type of service:  Video Visit     Originating Location (pt. Location): Car    Distant Location (provider location):  Off-site  Platform used for Video Visit: Zoom (Telehealth)    Start Time: 3:45 PM  End Time: 4:10 PM    Nadir has continued to make quality changes in eating and lifestyle, but still needs to complete all of the psychological testing. He will follow up in 2 weeks. F50.9; Alcohol Use Disorder, Moderate-Severe in Full Sustained Remission; E66.01

## 2024-05-29 ENCOUNTER — TELEPHONE (OUTPATIENT)
Dept: SURGERY | Facility: CLINIC | Age: 39
End: 2024-05-29

## 2024-05-29 NOTE — TELEPHONE ENCOUNTER
Patient needs to be rescheduled for their virtual visit due to Reason for Reschedule: No-Show    Appointment mode: Video  Provider: Susana Varner RD    LVM x3 for check in, unable to reach patient

## 2024-05-29 NOTE — TELEPHONE ENCOUNTER
5/29 left vm for...    Patient needs to be rescheduled for their virtual visit due to Reason for Reschedule: No-Show     Appointment mode: Video  Provider: Susana Varner RD

## 2024-06-17 ENCOUNTER — TELEPHONE (OUTPATIENT)
Dept: SURGERY | Facility: CLINIC | Age: 39
End: 2024-06-17

## 2024-06-17 NOTE — TELEPHONE ENCOUNTER
Nadir did not show for his 4 PM scheduled telehealth appointment. He should be marked as a no-show and a letter sent his address. He also will need to call back to reschedule.

## 2024-06-27 ENCOUNTER — VIRTUAL VISIT (OUTPATIENT)
Dept: SURGERY | Facility: CLINIC | Age: 39
End: 2024-06-27
Payer: COMMERCIAL

## 2024-06-27 DIAGNOSIS — Z71.3 NUTRITIONAL COUNSELING: ICD-10-CM

## 2024-06-27 DIAGNOSIS — E66.01 MORBID OBESITY WITH BMI OF 50.0-59.9, ADULT (H): Primary | ICD-10-CM

## 2024-06-27 PROCEDURE — 97803 MED NUTRITION INDIV SUBSEQ: CPT | Mod: 95

## 2024-06-27 NOTE — LETTER
6/27/2024      Dk Akins  [address] [place] [id number]      Dear Colleague,    Thank you for referring your patient, Dk Akins, to the [facility] AND [facility] [place]. Please see a copy of my visit note below.    Dk Akins is a 38 year old who is being evaluated via a billable video visit.      How would you like to obtain your AVS? MyChart  If the video visit is dropped, the invitation should be resent by: Text to cell phone: [phone]  Will anyone else be joining your video visit? No           Follow Up Surgical Weight Loss Supervised Diet Evaluation    Assessment:  Pt. is being seen today for a follow up RD nutritional evaluation. Pt. has been unsuccessful with non-surgical weight loss methods and is interested in bariatric surgery. Today we reviewed current eating habits and level of physical activity, and instructed on the changes that are required for successful bariatric outcomes.    Surgery of interest per pt: DS.  Weight loss medications: Qsymia     Workflow review:  Support Group: Not completed. Planning to attend 7/16/2024  Psychology:In progress. Has appointment set   Lab work:Completed.  SWL:No   HP phone visits - completed     Weight goal: At or below initial.    Anthropometrics:  Pt's weight is 389 lbs   Initial weight: 415 lbs  Weight change: 26 lbs down   BMI: There is no height or weight on file to calculate BMI.   Ideal body weight: 77.6 kg (171 lb 1.2 oz)  Adjusted ideal body weight: 121.9 kg (268 lb 10.3 oz)    Medical History:  Patient Active Problem List   Diagnosis     Closed Fracture Of The Patella     Bone cyst     Primary hypertension     Morbid obesity (H)     Obstructive sleep apnea syndrome     Alcohol consumption binge drinking     Alcohol dependence with unspecified alcohol-induced disorder (H)     Dyslipidemia     Metabolic syndrome     History of alcohol use disorder     Knee pain          Progress over past month:  Patient has switched to night shift this summer. Is packing a lunch to bring to work (snack ideas). Doing well practicing bariatric nutritional habits. Is down 26 lbs from initial. Taking his time with meals. Prioritizing protein at meal time. Reading nutrition facts labels. Is planning to buy protein shakes to bring to his job site.  - no longer drinking caffeine daily      Work schedule for the summer: 7pm - 5:30am    Diet Recall/Time  Breakfast: 2-3 eggs with fruit   Lunch: x2 chomps beef sticks (10g protein, 100kcal, low fat) with fruit (snack pairings)  Dinner: varies - chicken, steak or shrimp with broccoli, mushrooms and onions OR pasta or beef, chicken or turkey tacos     Typical Snacks: trail mix, granola bar     Overnight eating: No    Eating out: 0-2x per month    Meal duration: 20-30 minutes.      fluids by 30 minutes before, during meal, and waiting 30 minutes after meal before drinking fluids: Yes - continues to work on     Beverages  Water (120 oz+)  Gatorade zero    Crystal light squeeze    Exercise  Getting an average of 10-15k steps per day on his job site   Walking his dog when the weather allows     PES statement:   Morbid obesity related to excessive energy intake as evidenced by Intake of high caloric density foods/beverages (juice, soda, alcohol) at meals and/or snacks; large portions; frequent grazing; Estimated intake that exceeds estimated daily energy intake; Binge eating patterns; Frequent excessive fast food or restaurant intake; and BMI 52.76     Intervention      Food/Nutrient Delivery:  Educated patient on eating three meals, with cutting out snacking.  Bariatric Plate: Patient and I discussed the importance of including a lean protein source (20-30 grams/meal), vegetables (included at lunch and dinner), one serving (15g) of carbohydrate, and limited added fat (1 tb/day) at each meal.   Educated patient on how to complete a food journal and benefits of meal planning.    Educated patient on using a protein powder drink as a meal replacement and/or supplement after bariatric surgery.   Discussed importance of adequate hydration after surgery, with goal of at least 64 oz of fluids/day.  Addressed avoiding all carbonated, caffeinated and sweetened drinks to prepare for bariatric surgery.     Nutrition Counseling:  Mindful eating techniques: Encouraged slow meal pace, chewing foods to applesauce consistency for 20-30 minutes/meal.   Discussed  fluids 30 minutes before, during, and after meal to prevent dumping syndrome and discomfort post bariatric surgery.   Discussed pre/post operative diet progression, post op vitamin regimen, gave review of surgery process.     Instructions/Goals:     Continue implementing bariatric surgery lifestyle modifications.  Fill out food journal and return at follow up.    Handouts Provided:   Kye diet advancement     Monitor/Evaluation:  Pt. s target weight:  no gain from initial visit, pt. verbalized understanding.     Pt has completed all nutrition requirements and is well-informed of the dietary and physical activity requirements that are necessary for successful bariatric outcomes. This pt is an appropriate candidate for surgery from a nutrition standpoint at this time. The patient understands that surgery is a tool, not a cure, and post operative follow up is essential.     Plan for next visit:   Complete support group  Pre op class        Video-Visit Details    Type of service:  Video Visit    Video Start Time (time video started): 8:58 AM    Video End Time (time video stopped): 9:21 AM    Originating Location (pt. Location): Home      Distant Location (provider location):  Off-site    Mode of Communication:  Video Conference via [employer]    Physician has received verbal consent for a Video Visit from the patient? Yes      Susana Varner RD             Again, thank you for allowing me to participate in the care of your patient.         Sincerely,        Susana Varner RD

## 2024-06-27 NOTE — PROGRESS NOTES
Dk Akins is a 38 year old who is being evaluated via a billable video visit.      How would you like to obtain your AVS? MyChart  If the video visit is dropped, the invitation should be resent by: Text to cell phone: [phone]  Will anyone else be joining your video visit? No           Follow Up Surgical Weight Loss Supervised Diet Evaluation    Assessment:  Pt. is being seen today for a follow up RD nutritional evaluation. Pt. has been unsuccessful with non-surgical weight loss methods and is interested in bariatric surgery. Today we reviewed current eating habits and level of physical activity, and instructed on the changes that are required for successful bariatric outcomes.    Surgery of interest per pt: DS.  Weight loss medications: Qsymia     Workflow review:  Support Group: Not completed. Planning to attend 7/16/2024  Psychology:In progress. Has appointment set   Lab work:Completed.  SWL:No   HP phone visits - completed     Weight goal: At or below initial.    Anthropometrics:  Pt's weight is 389 lbs   Initial weight: 415 lbs  Weight change: 26 lbs down   BMI: There is no height or weight on file to calculate BMI.   Ideal body weight: 77.6 kg (171 lb 1.2 oz)  Adjusted ideal body weight: 121.9 kg (268 lb 10.3 oz)    Medical History:  Patient Active Problem List   Diagnosis    Closed Fracture Of The Patella    Bone cyst    Primary hypertension    Morbid obesity (H)    Obstructive sleep apnea syndrome    Alcohol consumption binge drinking    Alcohol dependence with unspecified alcohol-induced disorder (H)    Dyslipidemia    Metabolic syndrome    History of alcohol use disorder    Knee pain          Progress over past month: Patient has switched to night shift this summer. Is packing a lunch to bring to work (snack ideas). Doing well practicing bariatric nutritional habits. Is down 26 lbs from initial. Taking his time with meals. Prioritizing protein at meal time. Reading nutrition facts labels. Is  planning to buy protein shakes to bring to his job site.  - no longer drinking caffeine daily      Work schedule for the summer: 7pm - 5:30am    Diet Recall/Time  Breakfast: 2-3 eggs with fruit   Lunch: x2 chomps beef sticks (10g protein, 100kcal, low fat) with fruit (snack pairings)  Dinner: varies - chicken, steak or shrimp with broccoli, mushrooms and onions OR pasta or beef, chicken or turkey tacos     Typical Snacks: trail mix, granola bar     Overnight eating: No    Eating out: 0-2x per month    Meal duration: 20-30 minutes.      fluids by 30 minutes before, during meal, and waiting 30 minutes after meal before drinking fluids: Yes - continues to work on     Beverages  Water (120 oz+)  Gatorade zero    Crystal light squeeze    Exercise  Getting an average of 10-15k steps per day on his job site   Walking his dog when the weather allows     PES statement:   Morbid obesity related to excessive energy intake as evidenced by Intake of high caloric density foods/beverages (juice, soda, alcohol) at meals and/or snacks; large portions; frequent grazing; Estimated intake that exceeds estimated daily energy intake; Binge eating patterns; Frequent excessive fast food or restaurant intake; and BMI 52.76     Intervention      Food/Nutrient Delivery:  Educated patient on eating three meals, with cutting out snacking.  Bariatric Plate: Patient and I discussed the importance of including a lean protein source (20-30 grams/meal), vegetables (included at lunch and dinner), one serving (15g) of carbohydrate, and limited added fat (1 tb/day) at each meal.   Educated patient on how to complete a food journal and benefits of meal planning.   Educated patient on using a protein powder drink as a meal replacement and/or supplement after bariatric surgery.   Discussed importance of adequate hydration after surgery, with goal of at least 64 oz of fluids/day.  Addressed avoiding all carbonated, caffeinated and sweetened  drinks to prepare for bariatric surgery.     Nutrition Counseling:  Mindful eating techniques: Encouraged slow meal pace, chewing foods to applesauce consistency for 20-30 minutes/meal.   Discussed  fluids 30 minutes before, during, and after meal to prevent dumping syndrome and discomfort post bariatric surgery.   Discussed pre/post operative diet progression, post op vitamin regimen, gave review of surgery process.     Instructions/Goals:     Continue implementing bariatric surgery lifestyle modifications.  Fill out food journal and return at follow up.    Handouts Provided:   Kye diet advancement     Monitor/Evaluation:  Pt. s target weight:  no gain from initial visit, pt. verbalized understanding.     Pt has completed all nutrition requirements and is well-informed of the dietary and physical activity requirements that are necessary for successful bariatric outcomes. This pt is an appropriate candidate for surgery from a nutrition standpoint at this time. The patient understands that surgery is a tool, not a cure, and post operative follow up is essential.     Plan for next visit:   Complete support group  Pre op class        Video-Visit Details    Type of service:  Video Visit    Video Start Time (time video started): 8:58 AM    Video End Time (time video stopped): 9:21 AM    Originating Location (pt. Location): Home      Distant Location (provider location):  Off-site    Mode of Communication:  Video Conference via [facility]    Physician has received verbal consent for a Video Visit from the patient? Yes      Susana Varner RD

## 2024-06-27 NOTE — PATIENT INSTRUCTIONS
Diet Advancement Overview    Bariatric 2-Week Pre-Surgery Liquid Diet    3 Protein Shakes per Day    Cream of Wheat, Cream of Rice or Grits (plain), quinoa flakes    Sugar-free pudding, unsweetened applesauce and low-sugar, non-fat Greek yogurt or Light yogurt    Soups: beef/chicken and veggies, tomato soup and low-fat cream soups    Bariatric Clear Liquid Diet: Day before Surgery and 1-2 Meals after Surgery    Water, sugar-free clear liquid drinks, diluted fruit juice    Sugar-free Jell-O, Sugar-free Popsicles     Vegetable, chicken, or beef broth (regular or low-sodium)    Sip   ounce every 3-5 minutes (Post Op)      Bariatric Full Liquid Diet: First Week after Surgery (Duration: 1 Week RNY; 2 Weeks DS, LSG)   Cream of Wheat, Cream of Rice or Quinoa flakes   Sugar-free pudding, unsweetened applesauce, blended canned fruit in light juice or water    Protein Shakes (see approved list)   Light Yogurt or plain non-fat Greek yogurt (no fruit chunks: vanilla, lemon, etc.)   Low-fat blended & strained soups    Bariatric Pureed Diet:   RNY Gastric Bypass & Lap Band: 2 week duration   Duodenal Switch and Gastric Sleeve: 3 week duration   Blended canned tuna, chicken, or salmon mixed with light anguiano/Greek yogurt   Blended moist meats--lean ground turkey with spaghetti sauce and oregano, lean pork/beef/turkey/fish or baby food meats   1% cottage cheese or ricotta cheese, Fat free refried beans   Thinned instant mashed potatoes (with added protein powder)   Blended fruits and vegetables (avoid skins, peels, and membranes)    Bariatric Soft Solids   Tender or moist meat, poultry, fish   Soft cooked vegetables    Tuna, chicken, crab, or egg salad (made with plain Greek yogurt or light mayonnaise)   Banana, seedless melons, canned or other soft fruits without skins/membranes   Whole-grain crackers (ingredient listed with the word  whole )     Bariatric Regular Diet:    Avoid oatmeal, bread, rice, pasta, noodles, and white  crackers (Saltines, Ritz, etc.) and foods with skins, peels, membranes, and seeds for 3 months after surgery.    Skinless, boneless moist chicken and turkey breasts     Pork loin, pork tenderloin--add pureed apricots/unsweetened applesauce for moisture    93%-96% lean beef (sirloin, round, flank, hamburger)    Fresh or frozen fruits and veggies     Toasted whole grain bread--sandwich thins, bagel thins, light wheat bread    Brown/wild rice, quinoa, whole-grain crackers, etc.

## 2024-07-24 ENCOUNTER — VIRTUAL VISIT (OUTPATIENT)
Dept: SURGERY | Facility: CLINIC | Age: 39
End: 2024-07-24
Payer: COMMERCIAL

## 2024-07-24 ENCOUNTER — TELEPHONE (OUTPATIENT)
Dept: SURGERY | Facility: CLINIC | Age: 39
End: 2024-07-24

## 2024-07-24 DIAGNOSIS — E66.01 MORBID OBESITY (H): Primary | ICD-10-CM

## 2024-07-24 NOTE — TELEPHONE ENCOUNTER
I have left another message for Nadir to call back to schedule his surgical consult  8/6/24    I have left a message for Nadir to call back to schedule his surgical consult

## 2024-07-24 NOTE — PROGRESS NOTES
Virtual Visit Details    Type of service:  Video Visit     Originating Location (pt. Location): Home    Distant Location (provider location):  Off-site  Platform used for Video Visit: Zoom (Telehealth)    Start Time: 7:45 AM  End Time: 8:15 AM    Nadir has made quality changes in eating and lifestyle and appears more mindful about his eating even with the change of his work schedule. He is now ready to proceed with surgery. A report will be sent to the clinic. F50.9; alcohol use disorder, moderate, in full sustained remission; E66.01

## 2024-07-27 ENCOUNTER — HEALTH MAINTENANCE LETTER (OUTPATIENT)
Age: 39
End: 2024-07-27

## 2024-08-25 ENCOUNTER — HOSPITAL ENCOUNTER (EMERGENCY)
Facility: CLINIC | Age: 39
Discharge: HOME | End: 2024-08-25
Attending: EMERGENCY MEDICINE | Admitting: EMERGENCY MEDICINE
Payer: COMMERCIAL

## 2024-08-25 VITALS
WEIGHT: 315 LBS | HEIGHT: 72 IN | SYSTOLIC BLOOD PRESSURE: 186 MMHG | DIASTOLIC BLOOD PRESSURE: 100 MMHG | OXYGEN SATURATION: 97 % | TEMPERATURE: 98.8 F | RESPIRATION RATE: 26 BRPM | HEART RATE: 88 BPM | BODY MASS INDEX: 42.66 KG/M2

## 2024-08-25 DIAGNOSIS — R78.81 POSITIVE BLOOD CULTURE: ICD-10-CM

## 2024-08-25 DIAGNOSIS — L03.115 CELLULITIS OF RIGHT LOWER EXTREMITY: ICD-10-CM

## 2024-08-25 PROCEDURE — 36415 COLL VENOUS BLD VENIPUNCTURE: CPT | Performed by: EMERGENCY MEDICINE

## 2024-08-25 PROCEDURE — 99282 EMERGENCY DEPT VISIT SF MDM: CPT

## 2024-08-25 PROCEDURE — 87040 BLOOD CULTURE FOR BACTERIA: CPT | Performed by: EMERGENCY MEDICINE

## 2024-08-25 ASSESSMENT — COLUMBIA-SUICIDE SEVERITY RATING SCALE - C-SSRS
6. HAVE YOU EVER DONE ANYTHING, STARTED TO DO ANYTHING, OR PREPARED TO DO ANYTHING TO END YOUR LIFE?: NO
2. HAVE YOU ACTUALLY HAD ANY THOUGHTS OF KILLING YOURSELF IN THE PAST MONTH?: NO
1. IN THE PAST MONTH, HAVE YOU WISHED YOU WERE DEAD OR WISHED YOU COULD GO TO SLEEP AND NOT WAKE UP?: NO

## 2024-08-25 ASSESSMENT — ACTIVITIES OF DAILY LIVING (ADL): ADLS_ACUITY_SCORE: 35

## 2024-08-26 ENCOUNTER — PATIENT OUTREACH (OUTPATIENT)
Dept: CARE COORDINATION | Facility: CLINIC | Age: 39
End: 2024-08-26
Payer: COMMERCIAL

## 2024-08-26 NOTE — ED TRIAGE NOTES
Patient arrives to the ER after receiving a call from the doctor's office for positive blood cultures that were drawn on Friday at the [facility]. He was seen there for cellulitis of the RLE. Area was outlined and patient was sent home on oral antibiotics.      Triage Assessment (Adult)       Row Name 08/25/24 2256          Triage Assessment    Airway WDL WDL        Respiratory WDL    Respiratory WDL WDL        Skin Circulation/Temperature WDL    Skin Circulation/Temperature WDL X  redness to RLE, outlined on Friday at the [facility]        Cardiac WDL    Cardiac WDL WDL  HTN        Peripheral/Neurovascular WDL    Peripheral Neurovascular WDL WDL        Cognitive/Neuro/Behavioral WDL    Cognitive/Neuro/Behavioral WDL WDL

## 2024-08-26 NOTE — ED PROVIDER NOTES
EMERGENCY DEPARTMENT ENCOUNTER      NAME: Dk Akins  AGE: 38 year old male  YOB: 1985  MRN: [id number]  EVALUATION DATE & TIME: 8/25/2024 10:54 PM    PCP: Bridgette Peace    ED PROVIDER: Nader Byrne M.D.      Chief Complaint   Patient presents with    Abnormal Labs                FINAL IMPRESSION:  1. Cellulitis of right lower extremity    2. Positive blood culture          ED COURSE & MEDICAL DECISION MAKING:    Pertinent Labs & Imaging studies reviewed. (See chart for details)  38 year old male presents to the Emergency Department for evaluation of positive blood culture.  Patient was seen on Friday at the emergency room for likely cellulitis.  Started on Keflex and levofloxacin.  Feeling better.  Was called tonight with positive blood culture.  He did show me the note and does show gram-positive cocci.  I suspect this is a contaminant based on his lack of clinical symptoms.  Will repeat a culture here.  At this time I do not think he needs IV antibiotics or admission.  He is feeling well.  Will return for worsening symptoms.  Patient states understanding.  Discharge home.    11:05 PM I met with the patient to gather history and to perform my initial exam. I discussed the plan for care while in the Emergency Department.     At the conclusion of the encounter I discussed the results of all of the tests and the disposition. The questions were answered. The patient or family acknowledged understanding and was agreeable with the care plan.     Medical Decision Making  Obtained supplemental history:Supplemental history obtained?: Documented in chart  Reviewed external records: External records reviewed?: Documented in chart and Other: [facility] visit from 8/23/24  Care impacted by chronic illness:Hyperlipidemia and Hypertension  Care significantly affected by social determinants of health:Access to Medical Care  Did you consider but not order tests?: Work up considered but not performed and  documented in chart, if applicable  Did you interpret images independently?: Independent interpretation of ECG and images noted in documentation, when applicable.  Consultation discussion with other provider:Did you involve another provider (consultant, , pharmacy, etc.)?: No  Discharge. I recommended the patient continue their current prescription strength medication(s): keflex and levaquin. See documentation for any additional details.  No MIPS measures identified.      MEDICATIONS GIVEN IN THE EMERGENCY:  Medications - No data to display    NEW PRESCRIPTIONS STARTED AT TODAY'S ER VISIT  Discharge Medication List as of 8/25/2024 11:14 PM             =================================================================    HPI    Patient information was obtained from: Patient    Use of : N/A      Dk Akins is a 38 year old male with a pertinent history of dyslipidemia and hypertension who presents to this ED for evaluation of with an abnormal lab.    The patient reports here this evening after being called and told his blood culture tested positive for gram positive cocci.  He reports since being seen two days ago, the redness and pain in his right leg has improved.  He notes he has not had a fever or chills since Friday, when he was seen and started on antibiotics.  He overall is feeling much better. He has been taking his antibiotics.  He denies being diabetic.  No other complaints at this time.    Per chart review, the patient was seen at [facility] on 8/23/24 for leg pain.  He was found to have cellulitis and started on Keflex and Levaquin.    PAST MEDICAL HISTORY:  Past Medical History:   Diagnosis Date    Anxiety     Dyslipidemia     Femur fracture (H)     x4    History of alcohol use disorder     Quit March 2023    History of tobacco use     19-26    Humerus fracture 01/01/2000    Hypertension     Knee pain     Metabolic syndrome     LATOYA (obstructive sleep apnea)     Uses Bipap       PAST  SURGICAL HISTORY:  Past Surgical History:   Procedure Laterality Date    HC OPEN TX BIMALLEOLAR ANKLE FX, INCL INTERNAL FIXATION      Description: Open Treatment Of Bimalleolar Ankle Fracture;  Recorded: 09/27/2012;    KNEE SURGERY             CURRENT MEDICATIONS:    No current facility-administered medications for this encounter.     Current Outpatient Medications   Medication Sig Dispense Refill    atenolol-chlorthalidone (TENORETIC) 50-25 MG tablet Take 1 tablet by mouth daily 90 tablet 4    Phentermine-Topiramate (QSYMIA) 3.75-23 MG CP24 Take 1 capsule by mouth daily with food 90 capsule 3    tirzepatide-Weight Management (ZEPBOUND) 15 MG/0.5ML prefilled pen Inject 0.5 mLs (15 mg) Subcutaneous every 7 days for 336 days 6 mL 3         ALLERGIES:  No Known Allergies    FAMILY HISTORY:  Family History   Problem Relation Age of Onset    Intellectual Disability Brother     Breast Cancer Maternal Grandmother     Coronary Artery Disease Paternal Grandfather        SOCIAL HISTORY:   Social History     Socioeconomic History    Marital status:    Tobacco Use    Smoking status: Former     Types: Cigars    Smokeless tobacco: Never    Tobacco comments:     marijuana, not tobacco   Substance and Sexual Activity    Alcohol use: Not Currently     Comment: Sober since 03/17/2023    Drug use: Not Currently     Types: Marijuana       VITALS:  BP (!) 186/100   Pulse 88   Temp 98.8  F (37.1  C) (Oral)   Resp 26   Ht 1.829 m (6')   Wt (!) 180.6 kg (398 lb 1.6 oz)   SpO2 97%   BMI 53.99 kg/m      PHYSICAL EXAM    Physical Exam  Vitals and nursing note reviewed.   Constitutional:       General: He is not in acute distress.     Appearance: He is not diaphoretic.   HENT:      Head: Atraumatic.   Eyes:      General: No scleral icterus.     Pupils: Pupils are equal, round, and reactive to light.   Cardiovascular:      Rate and Rhythm: Normal rate and regular rhythm.      Heart sounds: Normal heart sounds.   Pulmonary:       Effort: No respiratory distress.      Breath sounds: Normal breath sounds.   Abdominal:      Palpations: Abdomen is soft.      Tenderness: There is no abdominal tenderness.   Musculoskeletal:         General: No tenderness.   Lymphadenopathy:      Cervical: No cervical adenopathy.   Skin:     General: Skin is warm.      Findings: Erythema and rash present.      Comments: There is a erythematous area of his right shin.  Does appear to be cellulitis.  It is receding into the area that was marked.  Not getting worse.  No streaking.  No lymphadenopathy.           LAB:  All pertinent labs reviewed and interpreted.  Labs Ordered and Resulted from Time of ED Arrival to Time of ED Departure - No data to display    RADIOLOGY:  Reviewed all pertinent imaging. Please see official radiology report.  No orders to display         I, Dk Alvarez, am serving as a scribe to document services personally performed by Dr. Nader Byrne, based on my observation and the provider's statements to me. I, Nader Byrne MD attest that Dk Alvarez is acting in a scribe capacity, has observed my performance of the services and has documented them in accordance with my direction.    Nader Byrne M.D.  Emergency Medicine  [facility] EMERGENCY ROOM  [address] [address]  [phone]  Dept: [phone]      Nader Byrne MD  08/25/24 1082

## 2024-08-26 NOTE — PROGRESS NOTES
[facility]  Community Health Worker Initial Outreach    CHW Initial Information Gathering:  Referral Source: ED Follow-Up  CHW Additional Questions  If ED/Hospital discharge, follow-up appointment scheduled as recommended?: No  Patient agreeable to assistance with scheduling?: Yes  CHW assisted patient to schedule (specify): CHW scheduled ED follow-up with PCP on 09/10/2024  Michaelhart active?: Yes    Patient accepts CC: No, patient declined at this time. Patient will be sent Care Coordination introduction letter for future reference.       Cindi Hope  Community Health Worker  [facility] Care Resource [place], [facility]    *Connected Care Resource Team does NOT follow patient ongoing. Referrals are identified based on internal discharge reports and the outreach is to ensure patient has an understanding of their discharge instructions.

## 2024-08-26 NOTE — LETTER
Dk Akins  [address] [place] [id number]    Dear Dk Akins,      I am a team member within the [facility] with  [facility]. I recently contacted you to ensure you are doing well following a visit within our health system. I also wanted to take this chance to introduce Clinic Care Coordination should you have any interest in this program in the future.    Below is a description of Clinic Care Coordination and how this team can further assist you:       The Clinic Care Coordination team is made up of a Registered Nurse, , Financial Resource Worker, and a Community Health Worker who understand and can help navigate the health care system. The goal of clinic care coordination is to help you manage your health, improve access to care, and achieve optimal health outcomes. They work alongside your provider to assist you in determining your health and social needs, obtain health care and community resources, and provide you with necessary information and education. Clinic Care Coordination can work with you through any barriers and develop a care plan that helps coordinate and strengthen the relationship between you and your care team.    If you wish to connect with the Clinic Care Coordination Team, please let your [facility] Primary Care Provider or Clinic Care Team know and they can place a referral. The Clinic Care Coordination team will then reach out by phone to further support you.    We are focused on providing you with the highest-quality healthcare experience possible.    Sincerely,   Your care team with [facility]

## 2024-08-27 DIAGNOSIS — E66.01 MORBID OBESITY WITH BMI OF 50.0-59.9, ADULT (H): ICD-10-CM

## 2024-08-29 RX ORDER — PHENTERMINE AND TOPIRAMATE 3.75; 23 MG/1; MG/1
CAPSULE, EXTENDED RELEASE ORAL
Qty: 90 CAPSULE | Refills: 3 | Status: SHIPPED | OUTPATIENT
Start: 2024-08-29

## 2024-08-31 LAB — BACTERIA BLD CULT: NO GROWTH

## 2024-09-03 ENCOUNTER — TELEPHONE (OUTPATIENT)
Dept: SURGERY | Facility: CLINIC | Age: 39
End: 2024-09-03

## 2024-09-03 ENCOUNTER — OFFICE VISIT (OUTPATIENT)
Dept: SURGERY | Facility: CLINIC | Age: 39
End: 2024-09-03
Payer: COMMERCIAL

## 2024-09-03 VITALS — HEIGHT: 72 IN | BODY MASS INDEX: 42.66 KG/M2 | WEIGHT: 315 LBS

## 2024-09-03 DIAGNOSIS — E66.01 MORBID OBESITY WITH BMI OF 50.0-59.9, ADULT (H): Primary | ICD-10-CM

## 2024-09-03 PROCEDURE — 99204 OFFICE O/P NEW MOD 45 MIN: CPT | Performed by: SURGERY

## 2024-09-03 RX ORDER — ONDANSETRON 2 MG/ML
4 INJECTION INTRAMUSCULAR; INTRAVENOUS
OUTPATIENT
Start: 2024-09-03

## 2024-09-03 RX ORDER — CELECOXIB 100 MG/1
400 CAPSULE ORAL
OUTPATIENT
Start: 2024-09-03

## 2024-09-03 RX ORDER — CEPHALEXIN 500 MG/1
CAPSULE ORAL
COMMUNITY
Start: 2024-08-23

## 2024-09-03 RX ORDER — GABAPENTIN 300 MG/1
600 CAPSULE ORAL
OUTPATIENT
Start: 2024-09-03

## 2024-09-03 RX ORDER — ACETAMINOPHEN 325 MG/1
975 TABLET ORAL ONCE
OUTPATIENT
Start: 2024-09-03 | End: 2024-09-03

## 2024-09-03 RX ORDER — HEPARIN SODIUM 5000 [USP'U]/.5ML
5000 INJECTION, SOLUTION INTRAVENOUS; SUBCUTANEOUS ONCE
OUTPATIENT
Start: 2024-09-03 | End: 2024-09-03

## 2024-09-03 NOTE — LETTER
9/3/2024      Dk Akins  [address] [place] [id number]      Dear Colleague,    Thank you for referring your patient, Dk Akins, to the [facility] [place]. Please see a copy of my visit note below.    HPI: Dk Akins is a 38 year old male here today for consideration of metabolic and bariatric surgery. He is referred by Dr. Peace.  He has struggled with obesity for most of his life, and is currently near his highest weight of 415 pounds.  Over the years he has engaged in several efforts at weight loss, including commercial programs such as weight watchers as well as self-directed low calorie diets.  These all had at best limited success, losing up to 40 pounds initially but always regained the weight afterwards.  He has made a number of positive changes in his life over the past 2 years, eliminating alcohol and smoking a year and a half ago with good effect.  Unfortunately his weight continued to increase in spite of these efforts.    He has increased difficulties with daily activities.  His motivation for pursuing surgery is that he wants to be more active with his young children, improve his overall health.  He has more learn more about bariatric surgery through working with our group, as well as conversations with acquaintances that have had bariatric surgery.    His bariatric comorbidities include hypertension, obstructive sleep apnea using BiPAP.    Bariatric comorbidities not present include type 2 diabetes.  Had GERD while drinkign heavlily, but not anymore.      Allergies:Patient has no known allergies.    Past Medical History:   Diagnosis Date     Anxiety      Dyslipidemia      Femur fracture (H)     x4     History of alcohol use disorder     Quit March 2023     History of tobacco use     [age]     Humerus fracture 01/01/2000     Hypertension      Knee pain      Metabolic syndrome      LATOYA (obstructive sleep apnea)     Uses Bipap        Past Surgical History:   Procedure Laterality Date     HC OPEN TX BIMALLEOLAR ANKLE FX, INCL INTERNAL FIXATION      Description: Open Treatment Of Bimalleolar Ankle Fracture;  Recorded: 09/27/2012;     KNEE SURGERY         CURRENT MEDS:  Prior to Admission medications    Medication Sig Start Date End Date Taking? Authorizing Provider   atenolol-chlorthalidone (TENORETIC) 50-25 MG tablet Take 1 tablet by mouth daily 1/25/24   Laurie Moreno MD   QSYMIA 3.75-23 MG CP24 TAKE ONE CAPSULE BY MOUTH EVERY DAY WITH FOOD 8/29/24   Laurie Moreno MD   tirzepatide-Weight Management (ZEPBOUND) 15 MG/0.5ML prefilled pen Inject 0.5 mLs (15 mg) Subcutaneous every 7 days for 336 days 1/25/24 12/26/24  Laurie Moreno MD         Social Connections: Not on file       Family History   Problem Relation Age of Onset     Intellectual Disability Brother      Breast Cancer Maternal Grandmother      Coronary Artery Disease Paternal Grandfather         reports that he has quit smoking. His smoking use included cigars. He has never used smokeless tobacco. He reports that he does not currently use alcohol. He reports that he does not currently use drugs after having used the following drugs: Marijuana.    History   Smoking Status     Former     Types: Cigars   Smokeless Tobacco     Never       Review of Systems -  A complete ROS was reviewed and except for what is listed in the HPI above, all others are negative  PSYCHIATRIC: He has undergone a lifestyle assessment and has been deemed a good candidate for bariatric surgery by the psychologist.    There were no vitals taken for this visit.    Wt Readings from Last 4 Encounters:   08/25/24 (!) 180.6 kg (398 lb 1.6 oz)   01/25/24 (!) 188.2 kg (415 lb)   05/10/23 (!) 176.8 kg (389 lb 11.2 oz)   07/11/22 (!) 158.8 kg (350 lb)        There is no height or weight on file to calculate BMI.    EXAM:  GENERAL: This is a well-developed 38 year old male who appears  his stated age  HEAD & NECK: Grossly normal.  No visible goiter or scars  CARDIAC: Regular rate and rhythm  CHEST/LUNG: Normal respiratory effort  ABDOMEN: Obese.  No visible hernias or masses appreciated.  LYMPHATIC:  No significant adenopathy visualized.    EXTREMITIES: Grossly normal.  No evidence of chronic venous stasis.    NEUROLOGIC: Focally intact  INTEGUMENT: No open lesions or ulcers appreciated visually  PSYCHIATRIC: Normal affect. He has a good grasp on the nature of his obesity and the treatment options.    LABS:      Assessment/Plan: 38 year old male who is an excellent candidate for bariatric and metabolic surgery.  After a careful conversation with the patient it was decided that a minimally invasive single anastomosis duodenal switch would be his best option.       I went over the surgery in detail with him.  I went over the nature of the operation and some of the potential consequences of the surgery.  I went over the expected hospital course and discussed laparoscopic versus open surgery, understanding that we will plan on doing this laparoscopically with the possibility of having to convert to an open operation.  I went over some of the risks and complications of the operation including, but not limited to, DVT, pulmonary emboli, pneumonia, postoperative bleeding, wound infection, staple line leak, intra-abdominal sepsis, and possible death.  I also went over some of the potential nutritional concerns such as vitamin B-12, iron, vitamin D, vitamin A, calcium and protein deficiencies.  I will also went over the need for lifelong nutritional surveillance.  This includes our regular scheduled follow up of a 1 week post op dietician visit, 2 week post op surgeon visit, an then additional dietician visits at 1 month, 3 months, and 9 months.  They are to follow up with a physician in our program at 6 months and 1 year, and annual thereafter.  The patient understands and wants to proceed with surgery.   We will submit for prior authorization.      Maxi Rolle MD ,MD  [facility] Department of Surgery      Again, thank you for allowing me to participate in the care of your patient.        Sincerely,        Maxi Rolle MD

## 2024-09-03 NOTE — PROGRESS NOTES
HPI: Dk Akins is a 38 year old male here today for consideration of metabolic and bariatric surgery. He is referred by Dr. Peace.  He has struggled with obesity for most of his life, and is currently near his highest weight of 415 pounds.  Over the years he has engaged in several efforts at weight loss, including commercial programs such as weight watchers as well as self-directed low calorie diets.  These all had at best limited success, losing up to 40 pounds initially but always regained the weight afterwards.  He has made a number of positive changes in his life over the past 2 years, eliminating alcohol and smoking a year and a half ago with good effect.  Unfortunately his weight continued to increase in spite of these efforts.    He has increased difficulties with daily activities.  His motivation for pursuing surgery is that he wants to be more active with his young children, improve his overall health.  He has more learn more about bariatric surgery through working with our group, as well as conversations with acquaintances that have had bariatric surgery.    His bariatric comorbidities include hypertension, obstructive sleep apnea using BiPAP.    Bariatric comorbidities not present include type 2 diabetes.  Had GERD while drinkign heavlily, but not anymore.      Allergies:Patient has no known allergies.    Past Medical History:   Diagnosis Date    Anxiety     Dyslipidemia     Femur fracture (H)     x4    History of alcohol use disorder     Quit March 2023    History of tobacco use     [age]    Humerus fracture 01/01/2000    Hypertension     Knee pain     Metabolic syndrome     LATOYA (obstructive sleep apnea)     Uses Bipap       Past Surgical History:   Procedure Laterality Date    HC OPEN TX BIMALLEOLAR ANKLE FX, INCL INTERNAL FIXATION      Description: Open Treatment Of Bimalleolar Ankle Fracture;  Recorded: 09/27/2012;    KNEE SURGERY         CURRENT MEDS:  Prior to Admission medications     Medication Sig Start Date End Date Taking? Authorizing Provider   atenolol-chlorthalidone (TENORETIC) 50-25 MG tablet Take 1 tablet by mouth daily 1/25/24   [employer] Laurie Moreno MD   QSYMIA 3.75-23 MG CP24 TAKE ONE CAPSULE BY MOUTH EVERY DAY WITH FOOD 8/29/24   [employer] Laurie Moreno MD   tirzepatide-Weight Management (ZEPBOUND) 15 MG/0.5ML prefilled pen Inject 0.5 mLs (15 mg) Subcutaneous every 7 days for 336 days 1/25/24 12/26/24  [employer] Laurie Moreno MD         Social Connections: Not on file       Family History   Problem Relation Age of Onset    Intellectual Disability Brother     Breast Cancer Maternal Grandmother     Coronary Artery Disease Paternal Grandfather         reports that he has quit smoking. His smoking use included cigars. He has never used smokeless tobacco. He reports that he does not currently use alcohol. He reports that he does not currently use drugs after having used the following drugs: Marijuana.    History   Smoking Status    Former    Types: Cigars   Smokeless Tobacco    Never       Review of Systems -  A complete ROS was reviewed and except for what is listed in the HPI above, all others are negative  PSYCHIATRIC: He has undergone a lifestyle assessment and has been deemed a good candidate for bariatric surgery by the psychologist.    There were no vitals taken for this visit.    Wt Readings from Last 4 Encounters:   08/25/24 (!) 180.6 kg (398 lb 1.6 oz)   01/25/24 (!) 188.2 kg (415 lb)   05/10/23 (!) 176.8 kg (389 lb 11.2 oz)   07/11/22 (!) 158.8 kg (350 lb)        There is no height or weight on file to calculate BMI.    EXAM:  GENERAL: This is a well-developed 38 year old male who appears his stated age  HEAD & NECK: Grossly normal.  No visible goiter or scars  CARDIAC: Regular rate and rhythm  CHEST/LUNG: Normal respiratory effort  ABDOMEN: Obese.  No visible hernias or masses appreciated.  LYMPHATIC:  No significant adenopathy visualized.    EXTREMITIES: Grossly  normal.  No evidence of chronic venous stasis.    NEUROLOGIC: Focally intact  INTEGUMENT: No open lesions or ulcers appreciated visually  PSYCHIATRIC: Normal affect. He has a good grasp on the nature of his obesity and the treatment options.    LABS:      Assessment/Plan: 38 year old male who is an excellent candidate for bariatric and metabolic surgery.  After a careful conversation with the patient it was decided that a minimally invasive single anastomosis duodenal switch would be his best option.       I went over the surgery in detail with him.  I went over the nature of the operation and some of the potential consequences of the surgery.  I went over the expected hospital course and discussed laparoscopic versus open surgery, understanding that we will plan on doing this laparoscopically with the possibility of having to convert to an open operation.  I went over some of the risks and complications of the operation including, but not limited to, DVT, pulmonary emboli, pneumonia, postoperative bleeding, wound infection, staple line leak, intra-abdominal sepsis, and possible death.  I also went over some of the potential nutritional concerns such as vitamin B-12, iron, vitamin D, vitamin A, calcium and protein deficiencies.  I will also went over the need for lifelong nutritional surveillance.  This includes our regular scheduled follow up of a 1 week post op dietician visit, 2 week post op surgeon visit, an then additional dietician visits at 1 month, 3 months, and 9 months.  They are to follow up with a physician in our program at 6 months and 1 year, and annual thereafter.  The patient understands and wants to proceed with surgery.  We will submit for prior authorization.      Maxi Rolle MD ,MD  [facility] Department of Surgery

## 2024-09-03 NOTE — TELEPHONE ENCOUNTER
Prior Authorization Retail Medication Request    Medication/Dose: Qsymia 3.75-23mg ER capsules  New/renewal/insurance change PA/secondary ins. PA:  Previously Tried and Failed:  Pt has been taking this medication and has lost 26+ pounds.   Rationale:  Previous approval  on 2024    Key: [id number]    Pharmacy Information (if different than what is on RX)  Name:  [facility]  Phone:  [phone]  Fax:  [phone]

## 2024-09-04 ENCOUNTER — DOCUMENTATION ONLY (OUTPATIENT)
Dept: SURGERY | Facility: CLINIC | Age: 39
End: 2024-09-04
Payer: COMMERCIAL

## 2024-09-04 NOTE — PROGRESS NOTES
I have submitted a PA to [employer] for this patient to be approved for a DOMINIQUE with Dr. Maxi Rolle

## 2024-09-04 NOTE — TELEPHONE ENCOUNTER
Prior Authorization Approval    Medication: QSYMIA 3.75-23 MG PO CP24  Authorization Effective Date: 9/4/2024  Authorization Expiration Date: 12/3/2024  Approved Dose/Quantity: as written  Reference #: [id number]   Insurance Company: [employer] - Phone [phone] Fax [phone]  Which Pharmacy is filling the prescription: [facility], [facility] [place], [place] - [address]  Pharmacy Notified: y  Patient Notified: Instructed pharmacy to notify patient once order is ready.

## 2024-09-04 NOTE — TELEPHONE ENCOUNTER
Tasklist updated.     Beverly Olivas RN, CBN  [facility]  P [phone]  F [phone]

## 2024-09-04 NOTE — TELEPHONE ENCOUNTER
PA Initiation    Medication: QSYMIA 3.75-23 MG PO CP24  Insurance Company: [employer] - Phone [phone] Fax [phone]  [facility] the [facility] [place] [address]  Filling Pharmacy Phone: [phone]  Filling Pharmacy Fax: [phone]  Start Date: 9/4/2024

## 2024-09-11 ENCOUNTER — DOCUMENTATION ONLY (OUTPATIENT)
Dept: SURGERY | Facility: CLINIC | Age: 39
End: 2024-09-11
Payer: COMMERCIAL

## 2024-09-11 NOTE — PROGRESS NOTES
Nadir is scheduled for a DOMINIQUE with Dr. Maxi Rolle on Thursday, October 31, 2024 @ 7:30 am.  Due to a work commitment, I have him scheduled for pre op class on Wednesday, October 9, 2024 @ 12:30 pm.  He will have his pre op and testing done within the [facility].    # [id number]  09/04/2024 - 09/03/2025

## 2024-09-12 NOTE — CONFIDENTIAL NOTE
Pre-op Class Checklist:    Initial Wt: 415 lb  AB Visit:    Wt Readings from Last 1 Encounters:   09/03/24 (!) 181.5 kg (400 lb 3.2 oz)     Hemoglobin A1C   Date Value Ref Range Status   01/25/2024 5.5 0.0 - 5.6 % Final     Comment:     Normal <5.7%   Prediabetes 5.7-6.4%    Diabetes 6.5% or higher     Note: Adopted from ADA consensus guidelines.      BiPAP: Yes   Smoking Cessation Date: 3/2023  Urine Nicotine Screen: No   Support Group: Yes   Anticoag Risk: Standard   Actigall: Need   Omeprazole: Need   Consent: Need - did wrong consent  Pharmacy: [facility], [place], [place] - [address] [[id number]]   Bariatrician: Laurie Moreno MD   Dietitian: Denny  Appointment with Inocencio scheduled: Yes  Lab request message sent: Yes     Tasklist updated.    Cindi Jamison RN, CBN, [facility]  [address] [place] [address], [address]  [email or web address]  Phone: [phone]  Fax:  [phone]

## 2024-09-26 ENCOUNTER — PATIENT OUTREACH (OUTPATIENT)
Dept: CARE COORDINATION | Facility: CLINIC | Age: 39
End: 2024-09-26
Payer: COMMERCIAL

## 2024-10-02 NOTE — PROGRESS NOTES
Pt attended the pre-surgery class for bariatric surgery class and was educated on the pre and post surgery liquid diets. Patient received information via [employer] for the pre-op liquid diet and the post-op liquid diet. Discussed appropriate liquids and discussed portions. Reviewed appropriate calories, protein, and fluid goals for each stage before and after surgery. Educated on correct vitamins/minerals, dosage, and frequency to take after surgery. Provided grocery list and sample menu plan for each diet stage.      Pt will begin pre-op liquid diet 10/17/2024 and will do clear liquids the day before surgery. Pt is scheduled for DOMINIQUE on 10/31/2024 and will then follow 1 week post-op liquid diet. Pt will follow up with RD 1-week post-op for education on the pureed and soft/regular diet stages.    Tanika Miner RD

## 2024-10-03 RX ORDER — IBUPROFEN 200 MG
800 TABLET ORAL EVERY 6 HOURS PRN
Status: ON HOLD | COMMUNITY
End: 2024-11-01

## 2024-10-03 RX ORDER — URSODIOL 300 MG/1
300 CAPSULE ORAL 2 TIMES DAILY
Qty: 180 CAPSULE | Refills: 1 | Status: SHIPPED | OUTPATIENT
Start: 2024-11-14

## 2024-10-03 RX ORDER — PEDI MULTIVIT NO.25/FOLIC ACID 300 MCG
1 TABLET,CHEWABLE ORAL 2 TIMES DAILY
Qty: 180 TABLET | Refills: 3 | Status: SHIPPED | OUTPATIENT
Start: 2024-10-03 | End: 2024-10-11

## 2024-10-03 NOTE — PATIENT INSTRUCTIONS
Patient name: Dk Akins  Surgery:  Single Anastomosis Duodenal Switch  Surgery Date:  10/31/2024  Surgery Time: 7:30 am (*This time is just a tentative time and may end up changing.*)  Arrival Time to Hospital: 5:30 am (two hours prior to surgery time)  Surgeon: Maxi Rolle MD       MONTH BEFORE SURGERY    Schedule and have Pre-operative History and Physical with your primary care in addition to the labs, CXR and EKG.  These should be completed within a month of surgery and no closer than 5 days.  ALL of the following tests must be completed per anesthesia and your surgeon prior to your surgery:    Chest Xray  EKG  Complete Blood Count with Differential  Complete Metabolic Panel  INR  APTT(PTT)  Urine Analysis (UA) with Urine Culture if UA abnormal  Hemaglobin A1c if hasn't been done in the last 3 months AND you are diabetic.  Urine Cotinine with Metabolites if you quit smoking within the last 6 months.    * COVID testing- is no longer required prior to your surgery.  However, please let us know if you aren't feeling well or have tested positive for Covid-19 between now and your surgery date. Let us know if you have any questions regarding this.      2 WEEKS BEFORE SURGERY    Start Preoperative Liquid Diet per dietitian instructions      WEEK BEFORE SURGERY CHECKLIST       Continue Full Liquid Diet per dietitian instructions    2.   Purchase diet components for after surgery      3.   Arrange for someone to stay with you the first 1-2 nights you return home.     4.   Arrange for a ride home from the hospital.  We can't give an exact time for  because it depends on how you are doing with your drinking and pain control.  Most people are reaching their goals for discharge by lunch time and you will need to have a ride ready and waiting by 11 am.    5.   Do your grocery/vitamin shopping for before AND after surgery.    6.   Make sure you have a bowel movement if you haven t gone in a while. There is  no need for a bowel prep before surgery.       7.   Make sure you have picked up the prescriptions/supplements that were sent to your pharmacy - [facility]     NEW PRESCRIPTIONS:  In preparation for surgery, we have prescribed some medication that you will need to  as soon as possible     A. Vitamins: Chewable Multivitamin and Vitamin B12 (if you weren't taking already)  - You can start taking the Multivitamin and B12 as soon as you pick this up from the pharmacy.  Some insurance companies will not cover the vitamins because they are available over the counter, so in those cases you will need to purchase them yourselves.  Do not take the morning of surgery.    B. Acid Reducer:  Omeprazole (Prilosec) - If not already taking, you will get a prescription to start when you get home from the hospital.  Tablets cannot be cut or crushed.  If a capsule, entire capsule contents may be sprinkled on a spoonful of applesauce and taken immediately without chewing.  If only on a full liquid diet, dump capsule contents into a spoonful of liquid and take without chewing.  May swallow whole again starting 6 weeks after surgery but can try swallowing sooner if having issues with opening into food.  Continue after surgery for at least 3 months even without symptoms of acid reflux. Do not take the morning of surgery.     C. Gallstone Reduction: Actigall (if needed) - Start two weeks after surgery.  Swallow whole with warm water, do not cut, crush, or open capsule up.  This is a medication that will help to prevent gallstones from forming during the first 6 months post-op of rapid weight loss.      BEFORE Surgery Medication Considerations:  Certain medications may need to be stopped before major surgery. Some medications may increase your risk of bleeding, others may change the way your blood clots, and other medications can affect your lab work. The bariatric clinic will give you specific instructions about when to  stop these medications.    This timeline shows when certain medications should be stopped before surgery: This is a general timeline, if your bariatric nurse or surgeon gives you other instructions, follow those specific instructions.    30 Days (One Month) Before Surgery  Birth control pills & patches that contain estrogen  You must use alternative forms of contraception  Hormone replacement therapy   Premarin  Testosterone  14 Days (Two Weeks) Before Surgery  Appetite control medications   Phentermine  Other: __________  Diuretics ( water pills )   Talk to your primary doctor.  You may need an alternative medication,  Hydrochlorothiazide (HCTZ)  Furosemide (Lasix )  Bumetanide (Bumex )  Spironolactone (Aldactone )  Chlorthalidone  Any blood pressure medication that is combined with a diuretic  Herbal supplements  Fish oil or Omega 3   Homeopathic remedies  7 Days (One Week) Before Surgery  Anti-platelet medications and medications that help to prevent blood clots:  Clopidogrel (Plavix ), Prasugrel (Effient ), Ticagrelor (Brilinta )  Aspirin/Dipyridamole (Aggrenox ), Dipyridamole (Persantine )  Cilostazol (Pletal )  All Non-Steroidal Anti-Inflammatory Drugs (NSAIDs):   Non-prescription: Ibuprofen (Advil , Motrin , Nuprin ), Naproxen (Naprosyn , Aleve , Anaprox ),   Prescription: Piroxicam (Feldene ), Sulindac (Clinoril ), Tolmentin (Tolectin ), Celecoxib (Celebrex ), Diclofenac (Voltaren ), Indomethacin (Indocin ), Nambumetone (Relafen ), Flurbiprofen (Ansaid ), Ketoprofen (Orudis ), Etodolac (Lodine ), Meloxicam (Mobic ), Oxaprozin (Daypro )  Aspirin (including low-dose (81mg) and chewable  baby aspirin )  Warfarin (Coumadin , Jantoven )  When warfarin is restarted (usually 24-48 hrs after surgery), dosing and INR monitoring will be managed by the Bariatric Clinic for 4-6 weeks after your surgery date.   The Day Before Surgery  Diabetes medications: Follow the instructions on the  Diabetes Management for the  Bariatric Surgery Patient  form.  The Day of Surgery  Diabetes medications: Follow the instructions on the  Diabetes Management for the Bariatric Surgery Patient  form.      HOME MEDICATION RECOMMENDATIONS:  Below you will see your specific medication instructions.  Please share this information with your primary care so they can make adjustments to your medications if necessary.    Atenolol/Chlorthalidone (Brand Name: Tenoretic)   Stop taking now and talk to your primary care provider about an alternative medication.  You may take just Atenolol, which is usually small enough to swallow whole. If your primary care provider wants to continue on this medication, it is ok to cut/crush.  *Take ATENOLOL ONLY a.m. of surgery with a sip of water*    Cephalexin (Brand Name: Keflex)  Ok to cut/crush.    Ibuprofen ((Brand Name: Advil, Motrin)   Stop 7 days before date of surgery. Avoid completely after surgery.    Phentermine/Topiramate (Brand Name: Qysmia)  Stop taking 2 weeks prior to surgery. Do not restart after surgery unless discussed with your bariatrician.      Tirzepatide (Brand Name: Mounjaro, Zepbound)  Stop taking this medication at least 1 week before surgery. Surgeon will determine if you will continue after surgery.         Packing for the Hospital  When packing for the hospital, anticipate staying overnight. Make a checklist so that you don't forget things you really want to have with you.    Bring a folder with your printed patient handouts to keep handy and add discharge paperwork to if you want.  Bring your insurance card.  Bring a current medication list OR update list in [facility] (including vitamins, over-the-counter medication, eye drops, inhalers, patches, ointments and herbal products). Include the name, correct dose and the times you take them each day. List all allergies.  If you use a CPAP or BIPAP, bring it with you.  You may also want to bring a water bottle of the water you use in your  "machine.  Bring a copy of your health care or advanced directive document if you have one.  You may bring your own gown/pajamas and robe if you don't want to wear the hospital-supplied items once IV is disconnected.  Slippers or shoes should have a non-slip sole.  You may bring your own personal care items such as toothbrush, toothpaste, shampoo, denture cleanser, comb, skin care products, deodorant, make-up, and hair dryer.  If you wear a hearing aid, bring a labeled storage container and extra batteries.  If you wear glasses or contacts, bring a labeled case and saline for contacts. Do not plan to wear contact lenses the day of surgery. It is best to bring your glasses so that you can wear them in the recovery room. You cannot wear contact lenses during surgery.  Bring loose-fitting clothing to wear home. Bring telephone numbers (including work numbers) of family and friends.  You may want to bring a journal to document your thoughts and feelings.  You may not wear any jewelry on or in any area of your body to the operating room.   Leave money, jewelry, or any valuables at home.        DAY BEFORE SURGERY CHECKLIST      Clear liquid diet until 4 hours prior to your surgery     2.  Nothing to eat or drink 4 hours prior to your surgery time.       3.  Pack a couple preferred protein shakes to have available in the hospital -See approved list of liquids from dietitian      4.  Hibiclens or Exidine shower. Use a fresh, clean towel to dry off. See \"Showering Before Surgery handout\" below.       MORNING OF SURGERY CHECKLIST      Hibiclens or Exidine shower. Use a fresh, clean towel to dry off. See \"Showering Before Surgery handout\" below.     2.   Remove all jewelry and piercings.      3.   Take any medications you have been advised to take the morning of surgery with a sip of water. (See med list above)     4.   Arrive 2 hours prior to your scheduled surgery time.    Showering Before " Surgery  [email or web address]     Preparing Your Skin  [email or web address]    HOSPITAL STAY    Park in the [facility] Visitor Parking Lot in [place] and check in at the information desk where they will escort you to the [facility].     You will be allowed to have 2 support people with you in the pre-operative area at the hospital.  After surgery on the recovery unit, you can have up to 4 visitors and they must leave when visitor hours are over.     Meet your surgical team which includes an RN, CRNA, anesthesiologist and your surgeon.    IV will be started for fluid management, pain medication and possible antibiotics.    Anticoagulant therapy (blood thinner) will be given and continued until you are discharged.    Pneumatic compression stockings will be placed on your legs before surgery to help prevent blood clots. You can also move your feet up and down frequently to aid in circulation.    An incentive spirometer will be used to promote good lung expansion and prevent pneumonia. This must be within arm's reach of you, and you should be doing it ten times every hour while awake.      PAIN MEDICATION IN THE HOSPITAL  Treating pain and discomfort is important to your recovery.  Your surgeon will order pain medication for you in the hospital.  You will also get a take home prescription for pain medication after surgery.  Our goal is not pain free, but an acceptable level of discomfort; you should be able to move without pain limiting your activity    In the Preoperative area, you will receive additional pain treatment   Intrathecal Spinae Block (Duramorph)- which is an ultrasound guided injection for pain medication   Ketorolac (Toradol )  This is an anti-inflammatory medication used to treat swelling and moderate pain  It is used only for 24 hours after surgery to decrease inflammation and pain  Given through your IV  Takes about 30 minutes to take effect  Common side effects: nausea,  upset stomach  If your pain is not well controlled with this, you may be given a narcotic pain medication in addition to ketorolac    In Recovery, you will receive additional IV pain medication as needed and then you will be switched over to things you can take by mouth in preparation for going home.   Fentanyl   Hydromorphone (Dilaudid )    Common side effects: sleepiness, dizziness, upset stomach, constipation   Narcotic medications can alter your judgment, coordination and reaction time.  Do NOT drive or do anything that requires clear thinking and coordination until you have been completely off narcotic medications for at least 24 hours.    Use only to treat moderate to severe pain.  Ultram (Tramadol)  This is a non-narcotic prescription pain medication used for moderate to severe pain if needed.  Immediate release tablets can be cut or crushed.  Tramadol can cause or worsen seizures. Your risk of seizures is higher if you're taking other certain medications. These drugs include other opioid pain drugs or certain medications for depression, other mood disorders, or psychosis.  Tramadol oral tablet may cause drowsiness. You should not drive, use heavy machinery, or perform any dangerous activities until you know how this drug affects you.  Acetaminophen (Tylenol)   This will be giving by IV to start and then by mouth once you are ready  Use for mild pain or discomfort  Available without a prescription and comes in liquid, tablets, capsules and powder.  The adult strength powder packs can be helpful right after surgery and can be found online.  Maximum daily dosage: 3,000mg per 24 hours  Works best as a scheduled dose for the first three days once you get home in addition to the gabapentin as the inflammation goes down  Gabapentin  Used together with the acetaminophen to provide pain relief.  Can be taken every 8 hours as needed  The medication works by calming the nerve cells that transmit pain signals to the  "brain.  This is best in liquid form right after surgery but can have a unpleasant taste.    * Important Reminder: Do NOT take NSAID or aspirin medications that are available without a prescription (examples: Ibuprofen, Motrin , Advil , Aleve , Naproxen)      OPERATING ROOM    Hover Mat will be used to move you from one surface to another.    Sandra Hugger Gowns/Blankets are used to keep you warm.    Urinary catheters are not usually needed.    Surgery takes 45 minutes-3 hours depending on the procedure.    *Remember: How you go to sleep tends to be how you wake up - so pleasant thoughts are important!    RECOVERY ROOM    The surgeon will give an update to your family or support people as you are on your way to the recovery room.    You will wake up with a blood pressure cuff, oxygen and pulse monitor (pulse oximeter).    Frequent vital signs.    Pain Scale.      HOSPITAL ROOM    You will stay overnight on Patient Care Unit P2.    You will have a private room.    [facility] has been awarded an \"American College of Surgeons Center of Excellence\" partnered with the American Society for Metabolic and Bariatric Surgery and has a proven track records for quality care and good outcomes.     The staff has specialty training in the care of weight-loss surgery patients.       WHAT TO EXPECT AFTER SURGERY    You will start walking the day of surgery and continue several times a day, especially once you get home.    Drinking and eating will follow plan discussed with the dietitian.    Shower in the hospital.    Incentive Spirometer use and deep breathing/cough    Splinting your incision and wearing the abdominal binder when moving may help with discomfort.    Discharge the day after surgery is expected for laparoscopic procedures after you are seen by your surgeon, nurse practitioner or physician s assistant, anesthesia, and possibly a pharmacist.    A nurse from the clinic will call you within 3 days after discharge.      ONCE " YOU ARE HOME CHECKLIST      Continue your liquid intake daily and track     Oral intake:      12-4pm         4pm-8pm         8pm-Midnight         6am-10am       2.   Walks      12-4pm      4pm-8pm      8pm-Midnight      6am-10am       3.   Incentive Spirometer/ Deep Breaths and Coughing      ACTIVITY AFTER SURGERY    Walking several times a day, increasing endurance and energy level over time.    No lifting over 20 lbs. for the first 2 weeks (6 weeks for open procedure) and then let your body be your guide.    Can be the    in terms of chores at home.    Pushing and pulling uses the same core muscles and those activities may cause pain or discomfort.    Do each exercise 10-15 times, twice a day and increase weight when you can consistently do 12 repetitions of activity.    No swimming, bathing, or hot tub use until incisions are completely healed (scars).    Do not plan to fly or take a road trip within 1 to 3 months after surgery.    See handouts below for exercises that can be done after surgery.    Seated Exercises for Arms and Legs (can be done before or after surgery)  [email or web address]    Exercise Guidelines after Weight Loss Surgery (1st 4-6 weeks)  [email or web address]    Exercises after Weight Loss Surgery (strengthening, when no weight lifting restrictions - after 4-6 weeks)  [email or web address]      MEDICATIONS AFTER SURGERY    Here is a simple graph that might help makes things more clear for when to start certain medications after surgery.  (Zofran, Tylenol, Gabapentin and Hyoscyamine will be sent home with you from the hospital as needed)    Medication Dose When to Start   Vitamin B12  1000 mcg Once a day under the tongue (sublingual), weekly nasal spray, or monthly injections Day after surgery   Chewable Multivitamin with   18 mg Iron  (Must also contain Vitamin A and Zinc)  NO GUMMIES 1 tablet twice daily Day after surgery   Omeprazole  20 mg  1 capsule daily - open and sprinkle on food or swallow with fluid Day after surgery. Take even if no acid reflux symptoms.   Zofran 4 mg  (if ordered) 4mg tablet every 8 hours as needed for nausea As needed after surgery   Tylenol 1000 mg every 6 hours for three days after surgery.  After first three days after surgery, switch to as needed and do not take more than 3000mg daily Once you get home   (you will be sent home from the hospital with this)   Hyoscyamine  (if ordered) 0.125 mg tablet every 4 hours as needed for stomach spasm pain As needed after surgery   Liquid Gabapentin  Must be kept in fridge   250 mg liquid 3 times a day for at least 3 days after surgery Once you get home   (you will be sent home from the hospital with this)   Actigall (Ursodiol)- 300 mg for gallbladder if you still have Twice daily - swallow whole with warm water Two weeks after surgery   Chewable Calcium Citrate 2 tablets twice daily Three weeks after surgery   Vitamin D - (125 mcg)  5000 units 1 daily Three weeks after surgery        LIFELONG VITAMINS:    First 3 Months after Surgery  Choose chewable, liquid or crushable forms of Multivitamin and Calcium Citrate and then you can switch to tablets you can swallow whole if you would like.    1.)   Sublingual Vitamin B12: Take 7652-4987 mcg 1 time daily    Also available in injectable form monthly.  Discuss with provider if interested.  2.)   Multivitamin with Iron: Take 1 multivitamin 2 times daily  Needs 18 mg of IRON per dose along with Vitamin A and Zinc in them  Generic Children's Chewable multivitamin with iron (Equate, Up & Up brand, etc.)  Centrum   Chewable  Centrum, Women's One-A-Day or Generic (after 3 months)  NO GUMMY Vitamins (these do not contain iron)  3.)   Calcium Citrate with Vitamin D: Take 400-600 mg 2 times daily (Start 3 weeks after surgery)  Bariatric Advantage: Citrate Lozenges (500mg)--2 Daily, or Chewy Bites (250 mg)--4 Daily  www.bariatricadvantage.com  or  5-397-133-6178  Celebrate Calcium: Calcium Plus 500 (500mg)--2 Daily, or Calcet Creamy Bites (500 mg)--2 Daily, or Calcium Citrate Chews (250mg) - 4 Daily   www.celebratevitamins.[employer]alo  or 1-966.587.1684  UNJURY Opurity: Calcium Citrate Plus (300mg)--3 Daily  www.[employer]  or 1-402.506.2012  Up-Eddy D: Nutrition Direct: Flavorless Calcium Powder (500 mg with 250 IU Vitamin D)  www.amazon.com  or 1-194.492.9744--3 Scoops Daily  Wellesse Liquid Calcium Citrate--1 Tbsp.  (500 mg)--2 Times Daily  ASSET4  After 3 months post op:  Citracal Petites (or Generic version) (200mg) - 4 daily  Any grocery store or Pharmacy  4.)   Vitamin D3 : Take 5000 IU Daily (Start 3 weeks after surgery)  This can remain a small gel cap    AFTER Surgery Medication Considerations:    The 3 main ideas:  Cut or crush all meds for 6 weeks after surgery  Long acting medications are not the best option anymore  Avoid NSAID medications for the rest of your life    1.  Cutting or crushing meds:  Before surgery, tablet size does not matter.  After surgery there will be swelling around your new stomach pouch or sleeve.  Therefore, it is important to limit the size of medications for the first six weeks after surgery.      What this means for you:  For the first six weeks after surgery, you will need to cut or crush tablets that are larger than   inch (about the size of an eraser on a standard pencil)  Some capsules may be opened and the contents sprinkled onto soft food.  Once sprinkled on food, eat immediately being careful not to chew.    You will be given a pill cutter at the hospital  Refer to your medication list. This list will tell you which medications can be cut or crushed, and which capsules can be opened.     * Important Reminder: Discuss ALL of your medications with your primary care provider.    Your pre-op history and physical appointment is a good time to review your current medications.     2.  Long acting medications are  not the best option anymore  Many types of bariatric surgery involve removing a portion of the small intestine. Long acting or extended release medications release slowly throughout the entire small intestine. Because your surgery has changed your stomach and/or intestine, you may not get the full effect of the long acting medication.  Short acting medications may work better for you after surgery. Talk with your doctor at your pre-op history and physical appointment if you are taking long acting medications. Your doctor can change prescriptions for long acting medications to short acting.   3.  Avoid NSAIDs for the rest of your life  NSAIDs are Non-Steroidal Anti-Inflammatory Drugs  The NSAID class of medications is used to relieve minor aches, pains or to treat fever. They are commonly used to treat pain caused by a cold, flu, sore throat, headache, and arthritis.  Some NSAIDs are available over-the-counter while others need a prescription from your doctor.     NSAIDs should be avoided after bariatric surgery because they can cause stomach ulcers, scarring or bleeding.  You will not be able to take any NSAID mediation for the rest of your life after bariatric surgery.  Below is a list of common NSAID medications:    OVER-THE-COUNTER NSAIDs    Ibuprofen  - Brand names Advil   , Motrin  , Nuprin     Naproxen - Brand names Aleve  , Naprosyn  , Anaprox      PRESCRIPTION NSAIDs   Celebrex   (Celecoxib)                                       Orudis   (Ketoprofen)  Mobic   (Meloxicam)                                           Lodine   (Etodolac)  Voltaren  (Diclofenac)                                        Ansaid   (Flurbiprofen)  Relafen   Nabumetone                                       Clinoril   (Sulindac)  Feldene   (Piroxicam)                                         Tolectin   Tolmentin  Indocin   (Indomethacin)                                    Daypro  (Oxaprozin)       After surgery, the only safe  non-prescription pain reliever is acetaminophen (Tylenol ).  Acetaminophen is available in 325mg and 500mg tablets.  If acetaminophen does not control your pain, call your primary care provider to discuss other options.    4. Other medications you may have to avoid  Aspirin  Do not use aspirin for headaches, body aches, or muscle aches after bariatric surgery.  This is because aspirin can also cause stomach ulcers.  However, your primary care provider may tell you to take aspirin for certain conditions.  A maximum of 81mg of enteric coated aspirin (ex: Ecotrin ) once daily is usually okay to take if directed to take aspirin by your primary care doctor.   Be sure to take with food or milk.    Alendronate (Fosamax ), risedronate (Actonel ) (risedronate), ibandronate (Boniva ):  These are common osteoporosis medications that can cause erosions or ulcers in the esophagus and stomach.   Remind your primary care provider that you have had bariatric surgery and discuss other medication options.    Diuretics ( water pills )  These medications are used to treat high blood pressure.  They can also reduce swelling and water retention caused by various medical conditions.  Diuretics should not be used for patients who are having weight loss surgery.  Your diuretic will not be restarted immediately after surgery.  This is because it is often difficult to drink enough fluids after surgery to keep up with the fluid loss caused by the diuretic/ water pill .  Call your primary care provider to discuss alternative medications to treat high blood pressure.      5. Medications for chronic conditions  It is likely that the need for some of your medications will change after weight loss surgery.    High Blood Pressure Medications   As you lose weight, your blood pressure may change. Talk with your primary care provider about how to manage your high blood pressure after surgery. You may need the dose of your blood pressure medication(s)  adjusted.  Keep track of your blood pressure, and call your primary doctor if you have low blood pressure symptoms, such as: dizziness, faintness, weakness, light-headedness (especially when going from sitting to standing)    Diabetes Medications  As you lose weight, your blood sugars may change. Talk with your primary care provider about how to monitor and manage your diabetes after surgery. You may need the dose of your diabetes medication(s) adjusted.  Call your primary doctor if you have any of these symptoms.  Keep track of your blood sugars, and call your primary doctor if you have low blood sugar symptoms, such as: sweating, shaking, nervousness, headache, light-headedness, dizziness, weakness, or fainting      6. Other information    Medication Absorption  Some medications may not be absorbed as well after bariatric surgery.  Watch for changes in symptoms.  It is important to discuss your medications with your doctor if you think your medications are not working as well as they were before.  You may need to have medication doses adjusted.    Ursodiol (Actigall )  With rapid weight loss, your risk of gallstones increases.  If your still have your gallbladder after surgery, you will be given a prescription for Actigall  that you should begin taking 2 weeks after surgery.  Actigall  will help prevent gallstones from forming.  If you are prescribed this medication, you will usually take it for six months. We encourage you to take this tablet or capsule whole with warm or hot liquid to help it dissolve before it reaches your stomach pouch.  This is medication is bigger than the   inch size restriction, but we will not have you cut/crush it due to it's unpleasant metallic taste. A pharmacist will speak with you more about this medication if it is ordered after surgery.    Common Side Effects: constipation, diarrhea, upset stomach, nausea, dizziness    Non-prescription medications:  You may need medication for  seasonal allergies, colds, headaches, or minor aches and pains.  It is important to read the package label carefully.  Below are some helpful hints for choosing the right medication:   Look at the active ingredient(s) list to be sure the medication does not contain caffeine, NSAIDs or aspirin (maximum aspirin dose: 81 mg daily).  Avoid long-acting medication options.  Immediate release medications may work better because they are absorbed better.  It is okay to use liquid medications with the following cautions:  Watch the sugar concentration.  High sugar content in medications could cause dumping syndrome.  Diluting the liquid medication with an equal amount of water will help prevent dumping syndrome.  Do not use products that contain high fructose corn syrup, corn syrup, sugar, or sorbitol.  Look for sugar-free products as an alternative.  Chewable tablets or tablets that dissolve on your tongue ( oral-dissolving tablet ) are generally safe to use.      Supplements  Refer to the Vitamins & Supplements Handout provided to you by the Bariatric Dietitian for recommended doses and products.       After Bariatric Surgery Discharge Instructions  [facility]     Note: Ask your nurse to order your medications from the pharmacy. Be sure you have your medications with you when you leave.    What should my diet be for the first 2 weeks after surgery?  Follow the bariatric diet the dietitian discussed with you.    How much fluid should I drink?  Strive for 48-64 ounces daily.  Carry a water bottle with you without a straw or sports top. Drink from it often.  Keep track of how much fluid you drink in a day.  Remember:  -Do not use straws, chew gum or suck on hard candies. They may cause painful gas.    -Sip, don't slurp when you drink.    -Practice small sips using a medicine cup for the first week postop.   -No ice or cold drinks. This could cause gas or spasms.   -No coffee, soda pop or  drinks with caffeine. These may cause stomach pain.   -No alcohol. It is bad for your liver and will cause stomach pain.    How often should I do my deep breathing and coughing?  Use your incentive spirometer (small plastic breathing device) every hour while awake after you get home. Using the incentive spirometer helps you deep breath. Continuing to cough and deep breath will help prevent fevers and pneumonia.   If you do not have a fever after one week, you can stop using the incentive spirometer and discard it.     You can continue to take deep breaths without the incentive spirometer every one to two hours while awake for the month after surgery.    What kinds of activity can I do?  Get plenty of rest the first few days after surgery and try to balance rest and activity. You will need some time to recover - you may be more tired than you realize at first. You'll feel better and heal faster if you take good care of yourself.  For 2 weeks after surgery (Please review restrictions at your two week visit, they could change based on how well you are doing):   -Don't lift more than 20 pounds.   -Take 4-5 short walks every day.  -Don't jog, run, or do belly exercises.  Don't swim, bathe or use a hot tub until your cuts are healed (scabs are gone).    You may shower right away after surgery.  Don't plan to fly or take a road trip within the first 1 to 3 months after surgery.  You could get a blood clot in your legs. If you must travel, get up and move around every hour for at least 5 minutes before continuing on your journey.  Your care team can help you decide when it's safe for you to travel.     What can I do for pain control?  You had major belly surgery that involves all layers of your belly muscles. Pain is expected, even for some as far out as 6-8 weeks after surgery. Moving, sneezing, coughing, and breathing will cause discomfort because these activities use your belly muscles.   Please see your after visit  summary medication review for what pain medication will be continued, discontinued and newly started for you.    You can take opioid pain medicine if prescribed and if needed. Try to wean off from it as soon as you feel comfortable.   Do not drive while you are taking opioid pain medicine. This is dangerous.  The first three days after surgery, take your acetaminophen (Tylenol) scheduled every 6 hours.  After that you can take it as needed.  -Acetaminophen formulation options:   -Liquid   -Caplet (Cut caplet in half before taking)   -We will have you on a higher dose of Tylenol for the first three days post-op but then but then you should not exceed 3000mg per day.  -You will also take Tylenol for pain in place of the opioid pain medicine (check with your care team for specifics).   You can apply ice or heat to the affected area(s). Just remember to wrap the ice in something and limit icing sessions to 20 minutes. Excessive icing can irritate the skin or cause skin damage.  You can apply heat with a hot, wet towel or heating pad. Just like cold therapy, limit heat application to 20 minutes. Never sleep with a heating pad on. It could cause severe burns to your skin.  Wear your binder to support your belly muscles if you have one.  Take this off a little more each day and try to be off completely by 2 weeks after surgery. If you don't need your binder for comfort or support, you don't need to wear it.   You may not be able to sleep in a comfortable position for a few weeks after surgery. This is normal. You may be more comfortable sleeping in a recliner or propped up with 3 pillows for the first couple of weeks after surgery.  You may feel gas pains in the upper chest or between your shoulder blades from the laparoscopic surgery which should get better with walking around, warm/cold packs and pain medication.  Do not take NSAID's (Non-Steroidal Anti-Inflammatory Drugs) (examples: ibuprofen, Motrin, Advil, Aleve, and  Naproxen), aspirin, or use pain patches with NSAID's. They will increase your risk of bleeding or getting an ulcer.    Please call the clinic for any of the following pain concerns, we would like to talk to you:  -pain that does not improve with rest  -pain that gets worse and worse  -pain that is not controlled by your pain medicine  -a sudden severe increase in pain    What medications will I need to take after surgery?  You may be discharged with the following types of medications: antacids, pain medications, anti-nausea medications, etc.  Please see your after visit summary medication review for what will be continued, discontinued and newly started.  It is important to reduce the amount of acid in your new stomach for a couple of months after surgery while it is still healing. We will prescribe an acid reducer or antacid. Take it as directed. This will help prevent ulcers, heartburn and acid reflux.  If you took an acid reducer before you had surgery, your care team will let you know what acid reducer you will take after surgery.   It is okay to swallow any medications smaller than   inch in size.   -If it is larger than   inch, it may need to be cut, crushed, or in a liquid form. See the above medication section for what is most appropriate for you and your medications.    What should I know about my incisions (cuts)?  Your incisions are covered with white steri strips or butterfly tape and have band aids or gauze over the top. If you have gauze or band aids, they can come off in the hospital.  Leave your steri strips on until they fall off on their own. If the steri strips don't fall off after 1 week, you can take them off. If they fall off earlier, replace them with clean band aids trying to avoid touching the incision itself.   If you have gauze covered by a clear dressing, remove 2-3 days after surgery or as directed by your care team.  You may shower in the hospital after surgery and can get your incision  coverings wet.  Do not submerge in water (e.g. No baths, swimming pools, hot tubs) until your incisions are completely healed (scabs are gone).    Call the clinic if you have any of these signs or symptoms of infection:  -Redness around the site.  -Drainage that smells bad.  -Drainage that is thick yellow or green.  -An increase in pain around the incision site  -An increase in swelling around the incision site  -Heat or warmth around incision site   -Fever of 101.5  F (38.3  C) or higher when taken under the tongue.    -Chills    Will my urine or bowel movements change?   Your first bowel movements (stools) will likely be liquid. You may also notice old blood or a darker color (black or maroon color) in your bowel movements.  This is not unusual and usually goes away after the first week, if not sooner. You may not have a bowel movement for a week.   If you have not had a bowel movement for at least three days after your surgery date and are passing gas, you can use over the counter stool softeners.  Please stop taking the stool softeners and laxatives if your stools are loose.  Increasing fluids and activity as well as getting off narcotics will help prevent constipation.    Call the clinic if:   -You have stomach pain.   -You continue to have constipation.  -You have excessive bloating after walking and passing gas.  -You are having 3 or more loose stools a day.  You may have an infection that we need to treat.    How can I prevent dehydration if I feel nauseated (sick to my stomach) and vomit (throw up)?   Vomiting is not normal after surgery. If you continue to have nausea and vomiting, call the clinic.   Nausea can be a sign of dehydration. That is why it is very important to track your fluids. Do not nap more than one hour during the day. Set a timer to wake yourself up, if needed. Too much sleep will keep you from drinking enough fluid during the day and lead to dehydration.  No outside activity in hot,  "humid weather until you can drink 48 to 64 ounces of fluid in 24 hours. If you sweat a lot, your body may lose too much water.  If having difficulty getting in your fluids, try to take a 1 ounce sip of water (one medicine cup) every 15 minutes.  Set a timer to remind yourself.    If you notice any of the following symptoms, please don't delay in calling the clinic.  Early identification gives us more options for treatment:  -Dark colored urine  -Urinating (pass water) less than 2-3 times per day  -Lack of energy  -Nausea  -Dizziness  -Headache  -Metallic taste in your mouth    Call the clinic ANY TIME at [phone] if:  -Your pain medicine is not working.  -You have a fever ? 101.5 F.  -You have belly or left shoulder pain that gets worse and worse.  -You have a swollen leg with redness, warmth, or pain behind the knee or calf.  -You have chest pain   -You feel very short of breath.  -You have a sudden severe increase in heart rate.  -You have vomiting that gets worse and worse.  -You have constant nausea (feeling sick to your stomach) that does not go away with medication.  -You have trouble swallowing.  -You have an increasing feeling that \"something is not right\".  -You have hiccups that do not stop.  -You have any questions or concerns.    If you have to go to the Emergency Room, we prefer you go to the hospital that did your surgery. Please let them know that you had bariatric surgery and to notify your surgeon.    When should I go back to the clinic?  Follow up with your care team in 1 week.   If this appointment was not already made, please call: [phone]    IMPORTANT PHONE NUMBERS:    Bariatric Nurse line (M-F 8am to 4:30pm)=[phone]  Main line (after hours/holidays/weekends)=[phone]    These are some additional handouts that are very important to read through and use after surgery.  They are good tools for after your surgery as you recover.      After Your Weight Loss " Surgery  [email or web address]      Mindfulness Meditation  [email or web address]    Conscious Breathing  [email or web address]    Keeping Track of Your Fluids (for after surgery)  [email or web address]      AFTER SURGERY APPOINTMENT SCHEDULE    1 week with Dietitian (TIM)  Dietitian Virtual Group Class scheduled for Tuesday 11/5/2024 from 1-2 pm with one of the dietitians. She will send you an email invitation to join the week of this class and the purpose of it is to check in with you and give you the next set of dietary instructions.  It will be using [employer] Teams, NOT in person or through [employer].    2 weeks with Surgeon  Surgeon video follow-up visit that will be done through [employer] which is already scheduled for you.    1 month with RD    3 months with RD    3 months with Psychologist    6 months with Bariatrician with labs    9 months with RD     12 months with Bariatrician with labs    18 months with RD or Bariatrician-possible labs    Annually after that with Bariatrician with labs and RD if needed      POP QUIZ    1.  How long do you need to be on full liquids after surgery? ________    2.  Name the 4 vitamin/mineral supplements you ll need to take for the rest of your life.    __________  _________  _________  __________    3.  How many grams of protein should you get in a day? ________    4.   Which meal would give you the most protein?    a.   1 oz. Cheese on 1 saltine cracker and 3 Tbsp applesauce.    b.     cup mashed potatoes and gravy with 2 Tablespoons applesauce    c.   3 Reduced Fat Wheat Thins, 1 oz. green beans, and 2 peeled grapes     5.   Concentrated urine, lack of energy, nausea, dizziness, headache, and a bad taste in your mouth are signs of:    a. Dumping    b.  Dehydration   c. Clogging    d. None of the above    6.     What is the minimum amount of time recommended for exercise?    a.  30 minutes 7 days a week    b.  30 minutes 3  days per week    c.   1 hour 5 days per week    d.  None of the above    7.     Drinking liquids with meals can cause:    a.  Vomiting     b.   Weight gain   c.   Desire to Snack    d.   All of the above    8.     The long-term success of my weight loss surgery depends on:    a.      Me      b.   My Surgeon     c.  My Support Group     d.  My Family    9.      If you receive ice, carbonation or straws in the hospital post-op, you should:    a.      Eat and drink whatever is given to you by the hospital staff    b.      Remind the hospital staff you are not to have ice, straws or carbonation.    c.      Take the ice but not the carbonation or straws    d.      Call 911    10.  Name two possible complications after bariatric surgery:    ________________________   ______________________    11. Name two habits of healthy bariatric surgery patients:    _______________________  ________________________    True or False    12.  This operation for obesity will require routine visits with my surgeon for the first year, and then I will be okay on my own once I lose my weight and change my diet.    13.  Obesity is a disease that can be managed with a variety of tools.  However, some individuals fail to lose weight or regain their weight because they resume snacking, binging, take in high fat or carbohydrate food & lack sufficient liquids and exercise.    14.  Women should avoid becoming pregnant for at least 18 months to 2 years following bariatric surgery.    15.  I can still drink 2-3 cans of soda or carbonated juices, water or other beverages after my surgery, as long as it is in moderation.    16.   Re-operation is sometimes necessary due to bleeding, hernias, ulceration, breakdown of stitches or staples, leakage and blockage of the intestines.    17.   I should call the clinic if I develop increased redness or swelling in or around my incision, an oral temperature of 101.5 or greater, increasing severe abdominal or  shoulder pain, increasing heart rate or anytime I just don t feel right.    18.   Gaining weight prior to surgery is dangerous because it can enlarge the liver, increase surgical risk and extend your recovery period.    19.   Once I lose my weight, I can drink alcohol as long as it is in moderation.    20.  It is possible I will have more emotional difficulties after surgery.    21.  I can take Aleve but not Ibuprofen or Aspirin after surgery.    22.  If I eat yogurt and drink milk daily, I don t have to take the recommended dose of calcium supplements    23.  Dumping Syndrome only occurs in gastric sleeve patients.    24.  It is possible you will gain weight or not lose much weight immediately after surgery.       We wish you the best and please let us know if you have any questions or concerns!    Cindi Jamison RN and Beverly Olivas RN    [facility]  [address] [place] [address], [address]  Phone: [phone]  Fax:  [phone]

## 2024-10-03 NOTE — PROGRESS NOTES
Patient attended group class to review pre-op instructions and dietary plan for upcoming surgery.     Discussed admission process, pre-op testing, and hospital course.  Medication recommendations and information about them before and after surgery given and explained.  Patient was given exercises to work on post-op for maintaining muscle mass and strengthening.  Bariatric quiz reviewed together to assess understanding.  Discharge instructions and follow up appointments given and reviewed with pt at this time and patient verbalized understanding. He will have his H&P on 10/11/2024 with Dk Akins MD and do his  pre-op testing during that visit. Prescriptions for vitamins, Omeprazole, and Actigall sent to the patient's pharmacy to be started after surgery.      Cindi Jamison RN, CBN

## 2024-10-09 ENCOUNTER — ALLIED HEALTH/NURSE VISIT (OUTPATIENT)
Dept: SURGERY | Facility: CLINIC | Age: 39
End: 2024-10-09
Payer: COMMERCIAL

## 2024-10-09 VITALS — BODY MASS INDEX: 54.93 KG/M2 | WEIGHT: 315 LBS

## 2024-10-09 DIAGNOSIS — K90.9 INTESTINAL MALABSORPTION: ICD-10-CM

## 2024-10-09 DIAGNOSIS — K21.9 ACID REFLUX: ICD-10-CM

## 2024-10-09 DIAGNOSIS — Z01.818 PRE-OPERATIVE CLEARANCE: Primary | ICD-10-CM

## 2024-10-09 DIAGNOSIS — R63.4 RAPID WEIGHT LOSS: ICD-10-CM

## 2024-10-09 DIAGNOSIS — Z98.84 S/P BILIOPANCREATIC DIVERSION WITH DUODENAL SWITCH: ICD-10-CM

## 2024-10-09 PROCEDURE — 99207 PR PREOP VISIT IN GLOBAL PKG: CPT | Mod: 25

## 2024-10-11 ENCOUNTER — ANCILLARY PROCEDURE (OUTPATIENT)
Dept: GENERAL RADIOLOGY | Facility: CLINIC | Age: 39
End: 2024-10-11
Attending: FAMILY MEDICINE
Payer: COMMERCIAL

## 2024-10-11 ENCOUNTER — OFFICE VISIT (OUTPATIENT)
Dept: FAMILY MEDICINE | Facility: CLINIC | Age: 39
End: 2024-10-11
Payer: COMMERCIAL

## 2024-10-11 VITALS
HEIGHT: 73 IN | RESPIRATION RATE: 16 BRPM | OXYGEN SATURATION: 97 % | SYSTOLIC BLOOD PRESSURE: 150 MMHG | WEIGHT: 315 LBS | DIASTOLIC BLOOD PRESSURE: 95 MMHG | TEMPERATURE: 98.4 F | BODY MASS INDEX: 41.75 KG/M2 | HEART RATE: 88 BPM

## 2024-10-11 DIAGNOSIS — E66.01 MORBID OBESITY (H): ICD-10-CM

## 2024-10-11 DIAGNOSIS — Z98.84 S/P BILIOPANCREATIC DIVERSION WITH DUODENAL SWITCH: ICD-10-CM

## 2024-10-11 DIAGNOSIS — G47.33 OBSTRUCTIVE SLEEP APNEA SYNDROME: ICD-10-CM

## 2024-10-11 DIAGNOSIS — Z01.818 PRE-OPERATIVE CLEARANCE: ICD-10-CM

## 2024-10-11 DIAGNOSIS — Z01.818 PRE-OPERATIVE CLEARANCE: Primary | ICD-10-CM

## 2024-10-11 DIAGNOSIS — I10 PRIMARY HYPERTENSION: ICD-10-CM

## 2024-10-11 DIAGNOSIS — Z23 IMMUNIZATION DUE: ICD-10-CM

## 2024-10-11 DIAGNOSIS — F10.21 ALCOHOL DEPENDENCE IN REMISSION (H): ICD-10-CM

## 2024-10-11 LAB
ALBUMIN UR-MCNC: NEGATIVE MG/DL
APPEARANCE UR: CLEAR
APTT PPP: 29 SECONDS (ref 22–38)
BACTERIA #/AREA URNS HPF: ABNORMAL /HPF
BASOPHILS # BLD AUTO: 0 10E3/UL (ref 0–0.2)
BASOPHILS NFR BLD AUTO: 1 %
BILIRUB UR QL STRIP: NEGATIVE
COLOR UR AUTO: YELLOW
EOSINOPHIL # BLD AUTO: 0.3 10E3/UL (ref 0–0.7)
EOSINOPHIL NFR BLD AUTO: 4 %
ERYTHROCYTE [DISTWIDTH] IN BLOOD BY AUTOMATED COUNT: 13.5 % (ref 10–15)
GLUCOSE UR STRIP-MCNC: NEGATIVE MG/DL
HCT VFR BLD AUTO: 40.8 % (ref 40–53)
HGB BLD-MCNC: 14 G/DL (ref 13.3–17.7)
HGB UR QL STRIP: NEGATIVE
IMM GRANULOCYTES # BLD: 0 10E3/UL
IMM GRANULOCYTES NFR BLD: 1 %
INR PPP: 1.03 (ref 0.85–1.15)
KETONES UR STRIP-MCNC: NEGATIVE MG/DL
LEUKOCYTE ESTERASE UR QL STRIP: NEGATIVE
LYMPHOCYTES # BLD AUTO: 2.3 10E3/UL (ref 0.8–5.3)
LYMPHOCYTES NFR BLD AUTO: 37 %
MCH RBC QN AUTO: 29.9 PG (ref 26.5–33)
MCHC RBC AUTO-ENTMCNC: 34.3 G/DL (ref 31.5–36.5)
MCV RBC AUTO: 87 FL (ref 78–100)
MONOCYTES # BLD AUTO: 0.5 10E3/UL (ref 0–1.3)
MONOCYTES NFR BLD AUTO: 9 %
NEUTROPHILS # BLD AUTO: 3.1 10E3/UL (ref 1.6–8.3)
NEUTROPHILS NFR BLD AUTO: 50 %
NITRATE UR QL: NEGATIVE
PH UR STRIP: 6 [PH] (ref 5–8)
PLATELET # BLD AUTO: 273 10E3/UL (ref 150–450)
RBC # BLD AUTO: 4.68 10E6/UL (ref 4.4–5.9)
RBC #/AREA URNS AUTO: ABNORMAL /HPF
SP GR UR STRIP: 1.02 (ref 1–1.03)
SQUAMOUS #/AREA URNS AUTO: ABNORMAL /LPF
UROBILINOGEN UR STRIP-ACNC: 0.2 E.U./DL
WBC # BLD AUTO: 6.3 10E3/UL (ref 4–11)
WBC #/AREA URNS AUTO: ABNORMAL /HPF

## 2024-10-11 PROCEDURE — 90471 IMMUNIZATION ADMIN: CPT | Performed by: FAMILY MEDICINE

## 2024-10-11 PROCEDURE — 80053 COMPREHEN METABOLIC PANEL: CPT | Performed by: FAMILY MEDICINE

## 2024-10-11 PROCEDURE — 93005 ELECTROCARDIOGRAM TRACING: CPT | Performed by: FAMILY MEDICINE

## 2024-10-11 PROCEDURE — 85025 COMPLETE CBC W/AUTO DIFF WBC: CPT | Performed by: FAMILY MEDICINE

## 2024-10-11 PROCEDURE — 93010 ELECTROCARDIOGRAM REPORT: CPT | Performed by: STUDENT IN AN ORGANIZED HEALTH CARE EDUCATION/TRAINING PROGRAM

## 2024-10-11 PROCEDURE — 36415 COLL VENOUS BLD VENIPUNCTURE: CPT | Performed by: FAMILY MEDICINE

## 2024-10-11 PROCEDURE — 81001 URINALYSIS AUTO W/SCOPE: CPT | Performed by: FAMILY MEDICINE

## 2024-10-11 PROCEDURE — 85730 THROMBOPLASTIN TIME PARTIAL: CPT | Performed by: FAMILY MEDICINE

## 2024-10-11 PROCEDURE — 85610 PROTHROMBIN TIME: CPT | Performed by: FAMILY MEDICINE

## 2024-10-11 PROCEDURE — 99214 OFFICE O/P EST MOD 30 MIN: CPT | Mod: 25 | Performed by: FAMILY MEDICINE

## 2024-10-11 PROCEDURE — 90656 IIV3 VACC NO PRSV 0.5 ML IM: CPT | Performed by: FAMILY MEDICINE

## 2024-10-11 PROCEDURE — 71046 X-RAY EXAM CHEST 2 VIEWS: CPT | Mod: TC | Performed by: RADIOLOGY

## 2024-10-11 RX ORDER — PEDI MULTIVIT NO.25/FOLIC ACID 300 MCG
1 TABLET,CHEWABLE ORAL DAILY
COMMUNITY
Start: 2024-10-11

## 2024-10-11 NOTE — PROGRESS NOTES
Preoperative Evaluation  [facility]  [address] [address]  Phone: [phone]  Fax: [phone]  Primary Provider: Bridgette Peace MD  Pre-op Performing Provider: Franky Gallardo MD  Oct 11, 2024             10/11/2024   Surgical Information   What procedure is being done? bariatric   Facility or Hospital where procedure/surgery will be performed: [facility]   Who is doing the procedure / surgery? fasen   Date of surgery / procedure: october 31   Time of surgery / procedure: 7am   Where do you plan to recover after surgery? at home with family        Fax number for surgical facility: Note does not need to be faxed, will be available electronically in Epic.    Assessment & Plan     The proposed surgical procedure is considered INTERMEDIATE risk.    Pre-operative clearance  Patient is low risk for cardiovascular complications and may proceed with bariatric surgery without need for further testing.  - EKG 12-lead, tracing only  - XR Chest 2 Views; Future    Morbid obesity (H)  Agree with surgery.    Primary hypertension  Elevated although not severe, however patient has not been taking blood pressure medicine recently because he forgot it at home when he traveled up [place] and just restarted today on his blood pressure medication.  Except blood pressure to improve with medication, OK to proceed with surgery.    Alcohol dependence with unspecified alcohol-induced disorder (H)  No alcohol since March 17th 2023.    Obstructive sleep apnea syndrome  BiPaP    Immunization due  - INFLUENZA VACCINE,SPLIT VIRUS,TRIVALENT,PF(FLUZONE)      The longitudinal plan of care for the diagnosis(es)/condition(s) as documented were addressed during this visit. Due to the added complexity in care, I will continue to support Nadir in the subsequent management and with ongoing continuity of care.         Risks and Recommendations  The patient has the following additional risks and  recommendations for perioperative complications:  Obstructive Sleep Apnea:   Wears a BiPaP    Antiplatelet or Anticoagulation Medication Instructions   - Patient is on no antiplatelet or anticoagulation medications.    Additional Medication Instructions   - Beta Blockers: Continue taking on the day of surgery.   - Diuretics: Continue taking on day of surgery because it is combined with beta-blocker.  HOLD Qsymia on the day of surgery.    Recommendation  Approval given to proceed with proposed procedure, without further diagnostic evaluation.    Miki Schmitz is a 39 year old, presenting for the following:  Pre-Op Exam (Patient is here for a pre op for bariatric surgery on 10/31/24 at [facility]. Not fasting. )          10/11/2024     1:16 PM   Additional Questions   Roomed by ricardo york cma   Accompanied by self     HPI related to upcoming procedure: Obesity with sleep apnea and HTN.        10/11/2024   Pre-Op Questionnaire   Have you ever had a heart attack or stroke? No   Have you ever had surgery on your heart or blood vessels, such as a stent placement, a coronary artery bypass, or surgery on an artery in your head, neck, heart, or legs? No   Do you have chest pain with activity? No   Do you have a history of heart failure? No   Do you currently have a cold, bronchitis or symptoms of other infection? No   Do you have a cough, shortness of breath, or wheezing? No   Do you or anyone in your family have previous history of blood clots? No   Do you or does anyone in your family have a serious bleeding problem such as prolonged bleeding following surgeries or cuts? No   Have you ever had problems with anemia or been told to take iron pills? No   Have you had any abnormal blood loss such as black, tarry or bloody stools? No   Have you ever had a blood transfusion? No   Are you willing to have a blood transfusion if it is medically needed before, during, or after your surgery? Yes   Have you or any of  your relatives ever had problems with anesthesia? No   Do you have sleep apnea, excessive snoring or daytime drowsiness? (!) YES - LATOYA on BiPaP   Do you have a CPAP machine? Yes - LATOYA on BiPaP   Do you have any artifical heart valves or other implanted medical devices like a pacemaker, defibrillator, or continuous glucose monitor? No   Do you have artificial joints? No   Are you allergic to latex? No        Health Care Directive  Patient does not have a Health Care Directive or Living Will: Discussed advance care planning with patient; information given to patient to review.    Preoperative Review of    reviewed - controlled substances reflected in medication list.          Patient Active Problem List    Diagnosis Date Noted    Dyslipidemia 01/25/2024     Priority: Medium    Metabolic syndrome 01/25/2024     Priority: Medium    History of alcohol use disorder 01/25/2024     Priority: Medium    Knee pain 01/25/2024     Priority: Medium    Alcohol consumption binge drinking 05/10/2023     Priority: Medium    Alcohol dependence with unspecified alcohol-induced disorder (H) 05/10/2023     Priority: Medium     stopped drinking 7 wks when it gone out of control  , currently in therapy and plan to go to AA meeting       Obstructive sleep apnea syndrome 11/29/2022     Priority: Medium    Morbid obesity (H) 04/18/2022     Priority: Medium    Primary hypertension 06/05/2019     Priority: Medium    Bone cyst      Priority: Medium     Created by [facility] Annotation: Jun 2 2008  8:Azul Rodgers: humeral   unicameral bone cyst  Replacement Utility updated for latest IMO load      Formatting of this note might be different from the original.  Created by [facility] Annotation: Jun 2 2008  8:Azul Rodgers: humeral   unicameral bone cyst    Replacement Utility updated for latest IMO load      Closed Fracture Of The Patella      Priority: Medium     Created by [facility]  Medical History:   Diagnosis Date    Anxiety     Dyslipidemia     Femur fracture (H)     x4    History of alcohol use disorder     Quit March 2023    History of tobacco use     18-26    Humerus fracture 01/01/2000    Hypertension     Knee pain     Metabolic syndrome     LATOYA (obstructive sleep apnea)     Uses Bipap     Past Surgical History:   Procedure Laterality Date    HC OPEN TX BIMALLEOLAR ANKLE FX, INCL INTERNAL FIXATION      Description: Open Treatment Of Bimalleolar Ankle Fracture;  Recorded: 09/27/2012;    KNEE SURGERY       Current Outpatient Medications   Medication Sig Dispense Refill    atenolol-chlorthalidone (TENORETIC) 50-25 MG tablet Take 1 tablet by mouth daily 90 tablet 4    ibuprofen (ADVIL/MOTRIN) 200 MG tablet Take 800 mg by mouth.      QSYMIA 3.75-23 MG CP24 TAKE ONE CAPSULE BY MOUTH EVERY DAY WITH FOOD 90 capsule 3    childrens multivitamin chewable tablet Take 1 tablet by mouth daily. Multivitamin must contain Vitamin A, Zinc and at least 18 mg of iron.      cyanocobalamin (VITAMIN B-12) 1000 MCG sublingual tablet Place 1 tablet (1,000 mcg) under the tongue daily. (Patient not taking: Reported on 10/11/2024) 90 tablet 3    [START ON 11/1/2024] omeprazole (PRILOSEC) 20 MG DR capsule Take 1 capsule (20 mg) by mouth daily. Start day after surgery, open contents and sprinkle on food for first 6 weeks. Take daily for 3 months after surgery. (Patient not taking: Reported on 10/11/2024) 90 capsule 0    [START ON 11/14/2024] ursodiol (ACTIGALL) 300 MG capsule Take 1 capsule (300 mg) by mouth 2 times daily. Begin two weeks after surgery. Take with warm water and swallow whole, do NOT open capsules.  Take for 6 months post-op (Patient not taking: Reported on 10/11/2024) 180 capsule 1       No Known Allergies     Social History     Tobacco Use    Smoking status: Former     Types: Cigars    Smokeless tobacco: Never    Tobacco comments:     marijuana, not tobacco   Substance Use Topics    Alcohol use: Not  "Currently     Comment: Sober since 03/17/2023       History   Drug Use Unknown             Review of Systems  CONSTITUTIONAL: NEGATIVE for fever, chills, change in weight  INTEGUMENTARY/SKIN: NEGATIVE for worrisome rashes, moles or lesions  EYES: NEGATIVE for vision changes or irritation  ENT/MOUTH: NEGATIVE for ear, mouth and throat problems  RESP: NEGATIVE for significant cough or SOB  CV: NEGATIVE for chest pain, palpitations or peripheral edema  GI: NEGATIVE for nausea, abdominal pain, heartburn, or change in bowel habits  : NEGATIVE for frequency, dysuria, or hematuria  MUSCULOSKELETAL: NEGATIVE for significant arthralgias or myalgia  NEURO: NEGATIVE for weakness, dizziness or paresthesias  ENDOCRINE: NEGATIVE for temperature intolerance, skin/hair changes  HEME: NEGATIVE for bleeding problems  PSYCHIATRIC: NEGATIVE for changes in mood or affect    Objective    BP (!) 150/95   Pulse 88   Temp 98.4  F (36.9  C) (Oral)   Resp 16   Ht 1.844 m (6' 0.6\")   Wt (!) 185 kg (407 lb 12.8 oz)   SpO2 97%   BMI 54.40 kg/m     Estimated body mass index is 54.4 kg/m  as calculated from the following:    Height as of this encounter: 1.844 m (6' 0.6\").    Weight as of this encounter: 185 kg (407 lb 12.8 oz).  Physical Exam  GENERAL: alert and no distress  EYES: Eyes grossly normal to inspection, PERRL and conjunctivae and sclerae normal  HENT: ear canals and TM's normal, nose and mouth without ulcers or lesions  NECK: no adenopathy, no asymmetry, masses, or scars  RESP: lungs clear to auscultation - no rales, rhonchi or wheezes  CV: regular rate and rhythm, normal S1 S2, no S3 or S4, no murmur, click or rub, no peripheral edema  ABDOMEN: soft, nontender, no hepatosplenomegaly, no masses and bowel sounds normal  MS: no gross musculoskeletal defects noted, no edema  SKIN: no suspicious lesions or rashes  NEURO: Normal strength and tone, mentation intact and speech normal  PSYCH: mentation appears normal, affect " normal/bright    Recent Labs   Lab Test 01/25/24  1439   HGB 14.6         POTASSIUM 3.8   CR 0.86   A1C 5.5        Diagnostics  Labs pending at this time.  Results will be reviewed when available.   EKG: appears normal, NSR, normal axis, normal intervals, no acute ST/T changes c/w ischemia, no LVH by voltage criteria, unchanged from previous tracings    Revised Cardiac Risk Index (RCRI)  The patient has the following serious cardiovascular risks for perioperative complications:   - No serious cardiac risks = 0 points     RCRI Interpretation: 0 points: Class I (very low risk - 0.4% complication rate)         Signed Electronically by: Franky Gallardo MD  A copy of this evaluation report is provided to the requesting physician.

## 2024-10-11 NOTE — H&P (VIEW-ONLY)
Preoperative Evaluation  [facility]  [address] [address]  Phone: [phone]  Fax: [phone]  Primary Provider: Bridgette Peace MD  Pre-op Performing Provider: Franky Gallardo MD  Oct 11, 2024             10/11/2024   Surgical Information   What procedure is being done? bariatric   Facility or Hospital where procedure/surgery will be performed: [facility]   Who is doing the procedure / surgery? fasen   Date of surgery / procedure: october 31   Time of surgery / procedure: 7am   Where do you plan to recover after surgery? at home with family        Fax number for surgical facility: Note does not need to be faxed, will be available electronically in Epic.    Assessment & Plan     The proposed surgical procedure is considered INTERMEDIATE risk.    Pre-operative clearance  Patient is low risk for cardiovascular complications and may proceed with bariatric surgery without need for further testing.  - EKG 12-lead, tracing only  - XR Chest 2 Views; Future    Morbid obesity (H)  Agree with surgery.    Primary hypertension  Elevated although not severe, however patient has not been taking blood pressure medicine recently because he forgot it at home when he traveled up [place] and just restarted today on his blood pressure medication.  Except blood pressure to improve with medication, OK to proceed with surgery.    Alcohol dependence with unspecified alcohol-induced disorder (H)  No alcohol since March 17th 2023.    Obstructive sleep apnea syndrome  BiPaP    Immunization due  - INFLUENZA VACCINE,SPLIT VIRUS,TRIVALENT,PF(FLUZONE)      The longitudinal plan of care for the diagnosis(es)/condition(s) as documented were addressed during this visit. Due to the added complexity in care, I will continue to support Nadir in the subsequent management and with ongoing continuity of care.         Risks and Recommendations  The patient has the following additional risks and  recommendations for perioperative complications:  Obstructive Sleep Apnea:   Wears a BiPaP    Antiplatelet or Anticoagulation Medication Instructions   - Patient is on no antiplatelet or anticoagulation medications.    Additional Medication Instructions   - Beta Blockers: Continue taking on the day of surgery.   - Diuretics: Continue taking on day of surgery because it is combined with beta-blocker.  HOLD Qsymia on the day of surgery.    Recommendation  Approval given to proceed with proposed procedure, without further diagnostic evaluation.    Miki Schmitz is a 39 year old, presenting for the following:  Pre-Op Exam (Patient is here for a pre op for bariatric surgery on 10/31/24 at [facility]. Not fasting. )          10/11/2024     1:16 PM   Additional Questions   Roomed by ricardo york cma   Accompanied by self     HPI related to upcoming procedure: Obesity with sleep apnea and HTN.        10/11/2024   Pre-Op Questionnaire   Have you ever had a heart attack or stroke? No   Have you ever had surgery on your heart or blood vessels, such as a stent placement, a coronary artery bypass, or surgery on an artery in your head, neck, heart, or legs? No   Do you have chest pain with activity? No   Do you have a history of heart failure? No   Do you currently have a cold, bronchitis or symptoms of other infection? No   Do you have a cough, shortness of breath, or wheezing? No   Do you or anyone in your family have previous history of blood clots? No   Do you or does anyone in your family have a serious bleeding problem such as prolonged bleeding following surgeries or cuts? No   Have you ever had problems with anemia or been told to take iron pills? No   Have you had any abnormal blood loss such as black, tarry or bloody stools? No   Have you ever had a blood transfusion? No   Are you willing to have a blood transfusion if it is medically needed before, during, or after your surgery? Yes   Have you or any of  your relatives ever had problems with anesthesia? No   Do you have sleep apnea, excessive snoring or daytime drowsiness? (!) YES - LATOYA on BiPaP   Do you have a CPAP machine? Yes - LATOYA on BiPaP   Do you have any artifical heart valves or other implanted medical devices like a pacemaker, defibrillator, or continuous glucose monitor? No   Do you have artificial joints? No   Are you allergic to latex? No        Health Care Directive  Patient does not have a Health Care Directive or Living Will: Discussed advance care planning with patient; information given to patient to review.    Preoperative Review of    reviewed - controlled substances reflected in medication list.          Patient Active Problem List    Diagnosis Date Noted    Dyslipidemia 01/25/2024     Priority: Medium    Metabolic syndrome 01/25/2024     Priority: Medium    History of alcohol use disorder 01/25/2024     Priority: Medium    Knee pain 01/25/2024     Priority: Medium    Alcohol consumption binge drinking 05/10/2023     Priority: Medium    Alcohol dependence with unspecified alcohol-induced disorder (H) 05/10/2023     Priority: Medium     stopped drinking 7 wks when it gone out of control  , currently in therapy and plan to go to AA meeting       Obstructive sleep apnea syndrome 11/29/2022     Priority: Medium    Morbid obesity (H) 04/18/2022     Priority: Medium    Primary hypertension 06/05/2019     Priority: Medium    Bone cyst      Priority: Medium     Created by [facility] Annotation: Jun 2 2008  8:Azul oRdgers: humeral   unicameral bone cyst  Replacement Utility updated for latest IMO load      Formatting of this note might be different from the original.  Created by [facility] Annotation: Jun 2 2008  8:Azul Rodgers: humeral   unicameral bone cyst    Replacement Utility updated for latest IMO load      Closed Fracture Of The Patella      Priority: Medium     Created by [facility]  Medical History:   Diagnosis Date    Anxiety     Dyslipidemia     Femur fracture (H)     x4    History of alcohol use disorder     Quit March 2023    History of tobacco use     18-26    Humerus fracture 01/01/2000    Hypertension     Knee pain     Metabolic syndrome     LATOYA (obstructive sleep apnea)     Uses Bipap     Past Surgical History:   Procedure Laterality Date    HC OPEN TX BIMALLEOLAR ANKLE FX, INCL INTERNAL FIXATION      Description: Open Treatment Of Bimalleolar Ankle Fracture;  Recorded: 09/27/2012;    KNEE SURGERY       Current Outpatient Medications   Medication Sig Dispense Refill    atenolol-chlorthalidone (TENORETIC) 50-25 MG tablet Take 1 tablet by mouth daily 90 tablet 4    ibuprofen (ADVIL/MOTRIN) 200 MG tablet Take 800 mg by mouth.      QSYMIA 3.75-23 MG CP24 TAKE ONE CAPSULE BY MOUTH EVERY DAY WITH FOOD 90 capsule 3    childrens multivitamin chewable tablet Take 1 tablet by mouth daily. Multivitamin must contain Vitamin A, Zinc and at least 18 mg of iron.      cyanocobalamin (VITAMIN B-12) 1000 MCG sublingual tablet Place 1 tablet (1,000 mcg) under the tongue daily. (Patient not taking: Reported on 10/11/2024) 90 tablet 3    [START ON 11/1/2024] omeprazole (PRILOSEC) 20 MG DR capsule Take 1 capsule (20 mg) by mouth daily. Start day after surgery, open contents and sprinkle on food for first 6 weeks. Take daily for 3 months after surgery. (Patient not taking: Reported on 10/11/2024) 90 capsule 0    [START ON 11/14/2024] ursodiol (ACTIGALL) 300 MG capsule Take 1 capsule (300 mg) by mouth 2 times daily. Begin two weeks after surgery. Take with warm water and swallow whole, do NOT open capsules.  Take for 6 months post-op (Patient not taking: Reported on 10/11/2024) 180 capsule 1       No Known Allergies     Social History     Tobacco Use    Smoking status: Former     Types: Cigars    Smokeless tobacco: Never    Tobacco comments:     marijuana, not tobacco   Substance Use Topics    Alcohol use: Not  "Currently     Comment: Sober since 03/17/2023       History   Drug Use Unknown             Review of Systems  CONSTITUTIONAL: NEGATIVE for fever, chills, change in weight  INTEGUMENTARY/SKIN: NEGATIVE for worrisome rashes, moles or lesions  EYES: NEGATIVE for vision changes or irritation  ENT/MOUTH: NEGATIVE for ear, mouth and throat problems  RESP: NEGATIVE for significant cough or SOB  CV: NEGATIVE for chest pain, palpitations or peripheral edema  GI: NEGATIVE for nausea, abdominal pain, heartburn, or change in bowel habits  : NEGATIVE for frequency, dysuria, or hematuria  MUSCULOSKELETAL: NEGATIVE for significant arthralgias or myalgia  NEURO: NEGATIVE for weakness, dizziness or paresthesias  ENDOCRINE: NEGATIVE for temperature intolerance, skin/hair changes  HEME: NEGATIVE for bleeding problems  PSYCHIATRIC: NEGATIVE for changes in mood or affect    Objective    BP (!) 150/95   Pulse 88   Temp 98.4  F (36.9  C) (Oral)   Resp 16   Ht 1.844 m (6' 0.6\")   Wt (!) 185 kg (407 lb 12.8 oz)   SpO2 97%   BMI 54.40 kg/m     Estimated body mass index is 54.4 kg/m  as calculated from the following:    Height as of this encounter: 1.844 m (6' 0.6\").    Weight as of this encounter: 185 kg (407 lb 12.8 oz).  Physical Exam  GENERAL: alert and no distress  EYES: Eyes grossly normal to inspection, PERRL and conjunctivae and sclerae normal  HENT: ear canals and TM's normal, nose and mouth without ulcers or lesions  NECK: no adenopathy, no asymmetry, masses, or scars  RESP: lungs clear to auscultation - no rales, rhonchi or wheezes  CV: regular rate and rhythm, normal S1 S2, no S3 or S4, no murmur, click or rub, no peripheral edema  ABDOMEN: soft, nontender, no hepatosplenomegaly, no masses and bowel sounds normal  MS: no gross musculoskeletal defects noted, no edema  SKIN: no suspicious lesions or rashes  NEURO: Normal strength and tone, mentation intact and speech normal  PSYCH: mentation appears normal, affect " normal/bright    Recent Labs   Lab Test 01/25/24  1439   HGB 14.6         POTASSIUM 3.8   CR 0.86   A1C 5.5        Diagnostics  Labs pending at this time.  Results will be reviewed when available.   EKG: appears normal, NSR, normal axis, normal intervals, no acute ST/T changes c/w ischemia, no LVH by voltage criteria, unchanged from previous tracings    Revised Cardiac Risk Index (RCRI)  The patient has the following serious cardiovascular risks for perioperative complications:   - No serious cardiac risks = 0 points     RCRI Interpretation: 0 points: Class I (very low risk - 0.4% complication rate)         Signed Electronically by: Franky Gallardo MD  A copy of this evaluation report is provided to the requesting physician.

## 2024-10-12 LAB
ALBUMIN SERPL BCG-MCNC: 4 G/DL (ref 3.5–5.2)
ALP SERPL-CCNC: 58 U/L (ref 40–150)
ALT SERPL W P-5'-P-CCNC: 43 U/L (ref 0–70)
ANION GAP SERPL CALCULATED.3IONS-SCNC: 10 MMOL/L (ref 7–15)
AST SERPL W P-5'-P-CCNC: 27 U/L (ref 0–45)
BILIRUB SERPL-MCNC: 0.5 MG/DL
BUN SERPL-MCNC: 11.6 MG/DL (ref 6–20)
CALCIUM SERPL-MCNC: 9.3 MG/DL (ref 8.8–10.4)
CHLORIDE SERPL-SCNC: 106 MMOL/L (ref 98–107)
CREAT SERPL-MCNC: 0.61 MG/DL (ref 0.67–1.17)
EGFRCR SERPLBLD CKD-EPI 2021: >90 ML/MIN/1.73M2
GLUCOSE SERPL-MCNC: 120 MG/DL (ref 70–99)
HCO3 SERPL-SCNC: 22 MMOL/L (ref 22–29)
POTASSIUM SERPL-SCNC: 3.9 MMOL/L (ref 3.4–5.3)
PROT SERPL-MCNC: 7 G/DL (ref 6.4–8.3)
SODIUM SERPL-SCNC: 138 MMOL/L (ref 135–145)

## 2024-10-14 LAB
ATRIAL RATE - MUSE: 75 BPM
DIASTOLIC BLOOD PRESSURE - MUSE: NORMAL MMHG
INTERPRETATION ECG - MUSE: NORMAL
P AXIS - MUSE: 46 DEGREES
PR INTERVAL - MUSE: 130 MS
QRS DURATION - MUSE: 100 MS
QT - MUSE: 382 MS
QTC - MUSE: 426 MS
R AXIS - MUSE: 52 DEGREES
SYSTOLIC BLOOD PRESSURE - MUSE: NORMAL MMHG
T AXIS - MUSE: 37 DEGREES
VENTRICULAR RATE- MUSE: 75 BPM

## 2024-10-30 ENCOUNTER — ANESTHESIA EVENT (OUTPATIENT)
Dept: SURGERY | Facility: HOSPITAL | Age: 39
DRG: 621 | End: 2024-10-30
Payer: COMMERCIAL

## 2024-10-30 ASSESSMENT — LIFESTYLE VARIABLES: TOBACCO_USE: 1

## 2024-10-31 ENCOUNTER — ANESTHESIA (OUTPATIENT)
Dept: SURGERY | Facility: HOSPITAL | Age: 39
DRG: 621 | End: 2024-10-31
Payer: COMMERCIAL

## 2024-10-31 ENCOUNTER — HOSPITAL ENCOUNTER (INPATIENT)
Facility: HOSPITAL | Age: 39
LOS: 1 days | Discharge: HOME | DRG: 621 | End: 2024-11-01
Attending: SURGERY | Admitting: SURGERY
Payer: COMMERCIAL

## 2024-10-31 DIAGNOSIS — I10 PRIMARY HYPERTENSION: ICD-10-CM

## 2024-10-31 DIAGNOSIS — Z98.84 S/P BILIOPANCREATIC DIVERSION WITH DUODENAL SWITCH: ICD-10-CM

## 2024-10-31 DIAGNOSIS — E66.01 MORBID (SEVERE) OBESITY DUE TO EXCESS CALORIES (H): Primary | ICD-10-CM

## 2024-10-31 DIAGNOSIS — I16.0 HYPERTENSIVE URGENCY: ICD-10-CM

## 2024-10-31 LAB — GLUCOSE BLDC GLUCOMTR-MCNC: 111 MG/DL (ref 70–99)

## 2024-10-31 PROCEDURE — 49659 UNLSTD LAPS PX HRNAP HRNRPHY: CPT | Performed by: SURGERY

## 2024-10-31 PROCEDURE — 49659 UNLSTD LAPS PX HRNAP HRNRPHY: CPT | Mod: AS | Performed by: NURSE PRACTITIONER

## 2024-10-31 PROCEDURE — 258N000003 HC RX IP 258 OP 636: Performed by: ANESTHESIOLOGY

## 2024-10-31 PROCEDURE — S2900 ROBOTIC SURGICAL SYSTEM: HCPCS | Performed by: SURGERY

## 2024-10-31 PROCEDURE — 250N000025 HC SEVOFLURANE, PER MIN: Performed by: SURGERY

## 2024-10-31 PROCEDURE — 250N000013 HC RX MED GY IP 250 OP 250 PS 637: Performed by: SURGERY

## 2024-10-31 PROCEDURE — 710N000009 HC RECOVERY PHASE 1, LEVEL 1, PER MIN: Performed by: SURGERY

## 2024-10-31 PROCEDURE — P9045 ALBUMIN (HUMAN), 5%, 250 ML: HCPCS | Performed by: ANESTHESIOLOGY

## 2024-10-31 PROCEDURE — 250N000013 HC RX MED GY IP 250 OP 250 PS 637: Performed by: NURSE PRACTITIONER

## 2024-10-31 PROCEDURE — 250N000011 HC RX IP 250 OP 636: Performed by: NURSE PRACTITIONER

## 2024-10-31 PROCEDURE — 0D194ZB BYPASS DUODENUM TO ILEUM, PERCUTANEOUS ENDOSCOPIC APPROACH: ICD-10-PCS | Performed by: SURGERY

## 2024-10-31 PROCEDURE — 8E0W4CZ ROBOTIC ASSISTED PROCEDURE OF TRUNK REGION, PERCUTANEOUS ENDOSCOPIC APPROACH: ICD-10-PCS | Performed by: SURGERY

## 2024-10-31 PROCEDURE — 999N000141 HC STATISTIC PRE-PROCEDURE NURSING ASSESSMENT: Performed by: SURGERY

## 2024-10-31 PROCEDURE — 370N000017 HC ANESTHESIA TECHNICAL FEE, PER MIN: Performed by: SURGERY

## 2024-10-31 PROCEDURE — 250N000009 HC RX 250: Performed by: ANESTHESIOLOGY

## 2024-10-31 PROCEDURE — 250N000011 HC RX IP 250 OP 636: Performed by: ANESTHESIOLOGY

## 2024-10-31 PROCEDURE — 999N000157 HC STATISTIC RCP TIME EA 10 MIN

## 2024-10-31 PROCEDURE — 250N000011 HC RX IP 250 OP 636: Performed by: SURGERY

## 2024-10-31 PROCEDURE — 258N000003 HC RX IP 258 OP 636: Performed by: NURSE PRACTITIONER

## 2024-10-31 PROCEDURE — 272N000001 HC OR GENERAL SUPPLY STERILE: Performed by: SURGERY

## 2024-10-31 PROCEDURE — 94660 CPAP INITIATION&MGMT: CPT

## 2024-10-31 PROCEDURE — 250N000009 HC RX 250: Performed by: SURGERY

## 2024-10-31 PROCEDURE — 88307 TISSUE EXAM BY PATHOLOGIST: CPT | Mod: TC | Performed by: SURGERY

## 2024-10-31 PROCEDURE — 120N000001 HC R&B MED SURG/OB

## 2024-10-31 PROCEDURE — 88307 TISSUE EXAM BY PATHOLOGIST: CPT | Mod: 26 | Performed by: PATHOLOGY

## 2024-10-31 PROCEDURE — 360N000080 HC SURGERY LEVEL 7, PER MIN: Performed by: SURGERY

## 2024-10-31 RX ORDER — NALOXONE HYDROCHLORIDE 1 MG/ML
0.2 INJECTION INTRAMUSCULAR; INTRAVENOUS; SUBCUTANEOUS
Status: DISCONTINUED | OUTPATIENT
Start: 2024-10-31 | End: 2024-11-01 | Stop reason: HOSPADM

## 2024-10-31 RX ORDER — FENTANYL CITRATE 50 UG/ML
100 INJECTION, SOLUTION INTRAMUSCULAR; INTRAVENOUS ONCE
Status: COMPLETED | OUTPATIENT
Start: 2024-10-31 | End: 2024-10-31

## 2024-10-31 RX ORDER — GABAPENTIN 250 MG/5ML
250 SOLUTION ORAL EVERY 8 HOURS SCHEDULED
Status: DISCONTINUED | OUTPATIENT
Start: 2024-10-31 | End: 2024-11-01

## 2024-10-31 RX ORDER — GABAPENTIN 300 MG/1
600 CAPSULE ORAL
Status: COMPLETED | OUTPATIENT
Start: 2024-10-31 | End: 2024-10-31

## 2024-10-31 RX ORDER — NALOXONE HYDROCHLORIDE 1 MG/ML
0.4 INJECTION INTRAMUSCULAR; INTRAVENOUS; SUBCUTANEOUS
Status: DISCONTINUED | OUTPATIENT
Start: 2024-10-31 | End: 2024-11-01 | Stop reason: HOSPADM

## 2024-10-31 RX ORDER — PROPOFOL 10 MG/ML
INJECTION, EMULSION INTRAVENOUS PRN
Status: DISCONTINUED | OUTPATIENT
Start: 2024-10-31 | End: 2024-10-31

## 2024-10-31 RX ORDER — GLYCOPYRROLATE 0.2 MG/ML
INJECTION, SOLUTION INTRAMUSCULAR; INTRAVENOUS PRN
Status: DISCONTINUED | OUTPATIENT
Start: 2024-10-31 | End: 2024-10-31

## 2024-10-31 RX ORDER — MAGNESIUM SULFATE 4 G/50ML
4 INJECTION INTRAVENOUS ONCE
Status: DISCONTINUED | OUTPATIENT
Start: 2024-10-31 | End: 2024-10-31 | Stop reason: HOSPADM

## 2024-10-31 RX ORDER — DEXAMETHASONE SODIUM PHOSPHATE 10 MG/ML
INJECTION, SOLUTION INTRAMUSCULAR; INTRAVENOUS PRN
Status: DISCONTINUED | OUTPATIENT
Start: 2024-10-31 | End: 2024-10-31

## 2024-10-31 RX ORDER — LIDOCAINE 40 MG/G
CREAM TOPICAL
Status: DISCONTINUED | OUTPATIENT
Start: 2024-10-31 | End: 2024-11-01 | Stop reason: HOSPADM

## 2024-10-31 RX ORDER — SODIUM CHLORIDE, SODIUM LACTATE, POTASSIUM CHLORIDE, CALCIUM CHLORIDE 600; 310; 30; 20 MG/100ML; MG/100ML; MG/100ML; MG/100ML
INJECTION, SOLUTION INTRAVENOUS CONTINUOUS
Status: DISCONTINUED | OUTPATIENT
Start: 2024-10-31 | End: 2024-10-31 | Stop reason: HOSPADM

## 2024-10-31 RX ORDER — NALBUPHINE HYDROCHLORIDE 20 MG/ML
5 INJECTION, SOLUTION INTRAMUSCULAR; INTRAVENOUS; SUBCUTANEOUS EVERY 4 HOURS PRN
Status: DISCONTINUED | OUTPATIENT
Start: 2024-10-31 | End: 2024-11-01 | Stop reason: HOSPADM

## 2024-10-31 RX ORDER — FENTANYL CITRATE 50 UG/ML
INJECTION, SOLUTION INTRAMUSCULAR; INTRAVENOUS PRN
Status: DISCONTINUED | OUTPATIENT
Start: 2024-10-31 | End: 2024-10-31

## 2024-10-31 RX ORDER — HYDROMORPHONE HCL IN WATER/PF 6 MG/30 ML
0.4 PATIENT CONTROLLED ANALGESIA SYRINGE INTRAVENOUS EVERY 5 MIN PRN
Status: CANCELLED | OUTPATIENT
Start: 2024-10-31

## 2024-10-31 RX ORDER — ONDANSETRON 2 MG/ML
INJECTION INTRAMUSCULAR; INTRAVENOUS PRN
Status: DISCONTINUED | OUTPATIENT
Start: 2024-10-31 | End: 2024-10-31

## 2024-10-31 RX ORDER — PROCHLORPERAZINE 25 MG
25 SUPPOSITORY, RECTAL RECTAL EVERY 12 HOURS PRN
Status: DISCONTINUED | OUTPATIENT
Start: 2024-10-31 | End: 2024-11-01 | Stop reason: HOSPADM

## 2024-10-31 RX ORDER — ONDANSETRON 2 MG/ML
4 INJECTION INTRAMUSCULAR; INTRAVENOUS EVERY 6 HOURS PRN
Status: DISCONTINUED | OUTPATIENT
Start: 2024-10-31 | End: 2024-11-01 | Stop reason: HOSPADM

## 2024-10-31 RX ORDER — ENALAPRILAT 1.25 MG/ML
1.25 INJECTION INTRAVENOUS EVERY 6 HOURS PRN
Status: DISCONTINUED | OUTPATIENT
Start: 2024-10-31 | End: 2024-11-01 | Stop reason: HOSPADM

## 2024-10-31 RX ORDER — ACETAMINOPHEN 325 MG/1
975 TABLET ORAL ONCE
Status: DISCONTINUED | OUTPATIENT
Start: 2024-10-31 | End: 2024-10-31 | Stop reason: HOSPADM

## 2024-10-31 RX ORDER — PROPOFOL 10 MG/ML
INJECTION, EMULSION INTRAVENOUS CONTINUOUS PRN
Status: DISCONTINUED | OUTPATIENT
Start: 2024-10-31 | End: 2024-10-31

## 2024-10-31 RX ORDER — ACETAMINOPHEN 325 MG/10.15ML
975 LIQUID ORAL EVERY 6 HOURS
Status: DISCONTINUED | OUTPATIENT
Start: 2024-10-31 | End: 2024-11-01 | Stop reason: HOSPADM

## 2024-10-31 RX ORDER — CEFAZOLIN SODIUM/WATER 3 G/30 ML
3 SYRINGE (ML) INTRAVENOUS
Status: COMPLETED | OUTPATIENT
Start: 2024-10-31 | End: 2024-10-31

## 2024-10-31 RX ORDER — NALOXONE HYDROCHLORIDE 0.4 MG/ML
0.1 INJECTION, SOLUTION INTRAMUSCULAR; INTRAVENOUS; SUBCUTANEOUS
Status: DISCONTINUED | OUTPATIENT
Start: 2024-10-31 | End: 2024-10-31 | Stop reason: HOSPADM

## 2024-10-31 RX ORDER — ACETAMINOPHEN 325 MG/1
975 TABLET ORAL EVERY 6 HOURS
Status: DISCONTINUED | OUTPATIENT
Start: 2024-10-31 | End: 2024-11-01 | Stop reason: HOSPADM

## 2024-10-31 RX ORDER — ACETAMINOPHEN 10 MG/ML
1000 INJECTION, SOLUTION INTRAVENOUS EVERY 6 HOURS
Status: DISCONTINUED | OUTPATIENT
Start: 2024-10-31 | End: 2024-11-01 | Stop reason: HOSPADM

## 2024-10-31 RX ORDER — ONDANSETRON 2 MG/ML
4 INJECTION INTRAMUSCULAR; INTRAVENOUS
Status: COMPLETED | OUTPATIENT
Start: 2024-10-31 | End: 2024-10-31

## 2024-10-31 RX ORDER — DEXMEDETOMIDINE HYDROCHLORIDE 4 UG/ML
INJECTION, SOLUTION INTRAVENOUS CONTINUOUS PRN
Status: DISCONTINUED | OUTPATIENT
Start: 2024-10-31 | End: 2024-10-31

## 2024-10-31 RX ORDER — CEFAZOLIN SODIUM/WATER 3 G/30 ML
3 SYRINGE (ML) INTRAVENOUS SEE ADMIN INSTRUCTIONS
Status: DISCONTINUED | OUTPATIENT
Start: 2024-10-31 | End: 2024-10-31 | Stop reason: HOSPADM

## 2024-10-31 RX ORDER — DEXTROSE MONOHYDRATE, SODIUM CHLORIDE, AND POTASSIUM CHLORIDE 50; 1.49; 4.5 G/1000ML; G/1000ML; G/1000ML
INJECTION, SOLUTION INTRAVENOUS CONTINUOUS
Status: DISCONTINUED | OUTPATIENT
Start: 2024-10-31 | End: 2024-11-01 | Stop reason: HOSPADM

## 2024-10-31 RX ORDER — SCOLOPAMINE TRANSDERMAL SYSTEM 1 MG/1
1 PATCH, EXTENDED RELEASE TRANSDERMAL
Status: DISCONTINUED | OUTPATIENT
Start: 2024-10-31 | End: 2024-10-31

## 2024-10-31 RX ORDER — CELECOXIB 200 MG/1
400 CAPSULE ORAL
Status: COMPLETED | OUTPATIENT
Start: 2024-10-31 | End: 2024-10-31

## 2024-10-31 RX ORDER — ENOXAPARIN SODIUM 100 MG/ML
40 INJECTION SUBCUTANEOUS EVERY 24 HOURS
Status: DISCONTINUED | OUTPATIENT
Start: 2024-10-31 | End: 2024-11-01 | Stop reason: HOSPADM

## 2024-10-31 RX ORDER — KETAMINE HYDROCHLORIDE 10 MG/ML
INJECTION INTRAMUSCULAR; INTRAVENOUS PRN
Status: DISCONTINUED | OUTPATIENT
Start: 2024-10-31 | End: 2024-10-31

## 2024-10-31 RX ORDER — ACETAMINOPHEN 325 MG/1
975 TABLET ORAL ONCE
Status: COMPLETED | OUTPATIENT
Start: 2024-10-31 | End: 2024-10-31

## 2024-10-31 RX ORDER — ONDANSETRON 2 MG/ML
4 INJECTION INTRAMUSCULAR; INTRAVENOUS EVERY 30 MIN PRN
Status: DISCONTINUED | OUTPATIENT
Start: 2024-10-31 | End: 2024-10-31 | Stop reason: HOSPADM

## 2024-10-31 RX ORDER — KETOROLAC TROMETHAMINE 15 MG/ML
15 INJECTION, SOLUTION INTRAMUSCULAR; INTRAVENOUS EVERY 6 HOURS
Status: DISCONTINUED | OUTPATIENT
Start: 2024-10-31 | End: 2024-11-01 | Stop reason: HOSPADM

## 2024-10-31 RX ORDER — MORPHINE SULFATE 1 MG/ML
150 INJECTION, SOLUTION EPIDURAL; INTRATHECAL; INTRAVENOUS ONCE
Status: DISCONTINUED | OUTPATIENT
Start: 2024-10-31 | End: 2024-10-31 | Stop reason: HOSPADM

## 2024-10-31 RX ORDER — LORAZEPAM 2 MG/ML
.5-1 INJECTION INTRAMUSCULAR EVERY 6 HOURS PRN
Status: DISCONTINUED | OUTPATIENT
Start: 2024-10-31 | End: 2024-11-01 | Stop reason: HOSPADM

## 2024-10-31 RX ORDER — ONDANSETRON 4 MG/1
4 TABLET, ORALLY DISINTEGRATING ORAL EVERY 30 MIN PRN
Status: DISCONTINUED | OUTPATIENT
Start: 2024-10-31 | End: 2024-10-31 | Stop reason: HOSPADM

## 2024-10-31 RX ORDER — LIDOCAINE 40 MG/G
CREAM TOPICAL
Status: DISCONTINUED | OUTPATIENT
Start: 2024-10-31 | End: 2024-10-31 | Stop reason: HOSPADM

## 2024-10-31 RX ORDER — LIDOCAINE HYDROCHLORIDE 10 MG/ML
INJECTION, SOLUTION INFILTRATION; PERINEURAL PRN
Status: DISCONTINUED | OUTPATIENT
Start: 2024-10-31 | End: 2024-10-31

## 2024-10-31 RX ORDER — HEPARIN SODIUM 5000 [USP'U]/.5ML
5000 INJECTION, SOLUTION INTRAVENOUS; SUBCUTANEOUS ONCE
Status: COMPLETED | OUTPATIENT
Start: 2024-10-31 | End: 2024-10-31

## 2024-10-31 RX ORDER — FENTANYL CITRATE 50 UG/ML
25 INJECTION, SOLUTION INTRAMUSCULAR; INTRAVENOUS EVERY 5 MIN PRN
Status: DISCONTINUED | OUTPATIENT
Start: 2024-10-31 | End: 2024-10-31 | Stop reason: HOSPADM

## 2024-10-31 RX ORDER — DEXAMETHASONE SODIUM PHOSPHATE 4 MG/ML
4 INJECTION, SOLUTION INTRA-ARTICULAR; INTRALESIONAL; INTRAMUSCULAR; INTRAVENOUS; SOFT TISSUE
Status: DISCONTINUED | OUTPATIENT
Start: 2024-10-31 | End: 2024-10-31 | Stop reason: HOSPADM

## 2024-10-31 RX ORDER — PROCHLORPERAZINE MALEATE 10 MG
10 TABLET ORAL EVERY 6 HOURS PRN
Status: DISCONTINUED | OUTPATIENT
Start: 2024-10-31 | End: 2024-11-01 | Stop reason: HOSPADM

## 2024-10-31 RX ORDER — MORPHINE SULFATE 1 MG/ML
INJECTION, SOLUTION EPIDURAL; INTRATHECAL; INTRAVENOUS
Status: COMPLETED | OUTPATIENT
Start: 2024-10-31 | End: 2024-10-31

## 2024-10-31 RX ORDER — FENTANYL CITRATE 50 UG/ML
50 INJECTION, SOLUTION INTRAMUSCULAR; INTRAVENOUS EVERY 5 MIN PRN
Status: DISCONTINUED | OUTPATIENT
Start: 2024-10-31 | End: 2024-10-31 | Stop reason: HOSPADM

## 2024-10-31 RX ORDER — BUPIVACAINE HYDROCHLORIDE 2.5 MG/ML
INJECTION, SOLUTION EPIDURAL; INFILTRATION; INTRACAUDAL PRN
Status: DISCONTINUED | OUTPATIENT
Start: 2024-10-31 | End: 2024-10-31 | Stop reason: HOSPADM

## 2024-10-31 RX ORDER — NALBUPHINE HYDROCHLORIDE 20 MG/ML
2.5-5 INJECTION, SOLUTION INTRAMUSCULAR; INTRAVENOUS; SUBCUTANEOUS EVERY 6 HOURS PRN
Status: DISCONTINUED | OUTPATIENT
Start: 2024-10-31 | End: 2024-10-31

## 2024-10-31 RX ORDER — ATENOLOL 25 MG/1
50 TABLET ORAL DAILY
Status: DISCONTINUED | OUTPATIENT
Start: 2024-11-01 | End: 2024-11-01 | Stop reason: HOSPADM

## 2024-10-31 RX ORDER — MULTIVIT WITH IRON,MINERALS
1 TABLET,CHEWABLE ORAL DAILY
Status: DISCONTINUED | OUTPATIENT
Start: 2024-11-01 | End: 2024-11-01 | Stop reason: HOSPADM

## 2024-10-31 RX ORDER — ONDANSETRON 2 MG/ML
4 INJECTION INTRAMUSCULAR; INTRAVENOUS EVERY 4 HOURS PRN
Status: DISCONTINUED | OUTPATIENT
Start: 2024-10-31 | End: 2024-10-31

## 2024-10-31 RX ORDER — HYDROMORPHONE HCL IN WATER/PF 6 MG/30 ML
0.2 PATIENT CONTROLLED ANALGESIA SYRINGE INTRAVENOUS EVERY 5 MIN PRN
Status: CANCELLED | OUTPATIENT
Start: 2024-10-31

## 2024-10-31 RX ORDER — ONDANSETRON 4 MG/1
4 TABLET, ORALLY DISINTEGRATING ORAL EVERY 6 HOURS PRN
Status: DISCONTINUED | OUTPATIENT
Start: 2024-10-31 | End: 2024-11-01 | Stop reason: HOSPADM

## 2024-10-31 RX ORDER — TRAMADOL HYDROCHLORIDE 50 MG/1
100 TABLET ORAL EVERY 6 HOURS PRN
Status: DISCONTINUED | OUTPATIENT
Start: 2024-10-31 | End: 2024-11-01 | Stop reason: HOSPADM

## 2024-10-31 RX ADMIN — SODIUM CHLORIDE, POTASSIUM CHLORIDE, SODIUM LACTATE AND CALCIUM CHLORIDE: 600; 310; 30; 20 INJECTION, SOLUTION INTRAVENOUS at 07:35

## 2024-10-31 RX ADMIN — ONDANSETRON 4 MG: 2 INJECTION INTRAMUSCULAR; INTRAVENOUS at 10:11

## 2024-10-31 RX ADMIN — GABAPENTIN 250 MG: 250 SUSPENSION ORAL at 15:24

## 2024-10-31 RX ADMIN — CELECOXIB 400 MG: 200 CAPSULE ORAL at 06:23

## 2024-10-31 RX ADMIN — FENTANYL CITRATE 100 MCG: 50 INJECTION, SOLUTION INTRAMUSCULAR; INTRAVENOUS at 07:53

## 2024-10-31 RX ADMIN — HEPARIN SODIUM 5000 UNITS: 10000 INJECTION, SOLUTION INTRAVENOUS; SUBCUTANEOUS at 07:29

## 2024-10-31 RX ADMIN — PHENYLEPHRINE HYDROCHLORIDE 100 MCG: 10 INJECTION INTRAVENOUS at 09:44

## 2024-10-31 RX ADMIN — DEXMEDETOMIDINE HYDROCHLORIDE 0.2 MCG/KG/HR: 400 INJECTION INTRAVENOUS at 08:09

## 2024-10-31 RX ADMIN — DEXAMETHASONE SODIUM PHOSPHATE 10 MG: 10 INJECTION, SOLUTION INTRAMUSCULAR; INTRAVENOUS at 08:07

## 2024-10-31 RX ADMIN — ONDANSETRON 4 MG: 2 INJECTION INTRAMUSCULAR; INTRAVENOUS at 06:33

## 2024-10-31 RX ADMIN — FAMOTIDINE 20 MG: 10 INJECTION, SOLUTION INTRAVENOUS at 06:33

## 2024-10-31 RX ADMIN — KETAMINE HYDROCHLORIDE 50 MG: 10 INJECTION INTRAMUSCULAR; INTRAVENOUS at 07:53

## 2024-10-31 RX ADMIN — MIDAZOLAM HYDROCHLORIDE 2 MG: 1 INJECTION, SOLUTION INTRAMUSCULAR; INTRAVENOUS at 07:12

## 2024-10-31 RX ADMIN — FENTANYL CITRATE 100 MCG: 50 INJECTION, SOLUTION INTRAMUSCULAR; INTRAVENOUS at 07:12

## 2024-10-31 RX ADMIN — Medication 160 MG: at 08:05

## 2024-10-31 RX ADMIN — SUGAMMADEX 400 MG: 100 INJECTION, SOLUTION INTRAVENOUS at 10:20

## 2024-10-31 RX ADMIN — POTASSIUM CHLORIDE, DEXTROSE MONOHYDRATE AND SODIUM CHLORIDE: 150; 5; 450 INJECTION, SOLUTION INTRAVENOUS at 19:28

## 2024-10-31 RX ADMIN — ACETAMINOPHEN 1000 MG: 10 INJECTION, SOLUTION INTRAVENOUS at 20:56

## 2024-10-31 RX ADMIN — PHENYLEPHRINE HYDROCHLORIDE 100 MCG: 10 INJECTION INTRAVENOUS at 09:48

## 2024-10-31 RX ADMIN — ROCURONIUM BROMIDE 50 MG: 50 INJECTION, SOLUTION INTRAVENOUS at 08:20

## 2024-10-31 RX ADMIN — LIDOCAINE HYDROCHLORIDE 50 MG: 10 INJECTION, SOLUTION INFILTRATION; PERINEURAL at 07:59

## 2024-10-31 RX ADMIN — KETAMINE HYDROCHLORIDE 50 MG: 10 INJECTION INTRAMUSCULAR; INTRAVENOUS at 08:01

## 2024-10-31 RX ADMIN — HYOSCYAMINE SULFATE 125 MCG: 0.12 TABLET SUBLINGUAL at 15:24

## 2024-10-31 RX ADMIN — PHENYLEPHRINE HYDROCHLORIDE 100 MCG: 10 INJECTION INTRAVENOUS at 09:52

## 2024-10-31 RX ADMIN — GLYCOPYRROLATE 0.3 MG: 0.2 INJECTION INTRAMUSCULAR; INTRAVENOUS at 08:01

## 2024-10-31 RX ADMIN — ACETAMINOPHEN 1000 MG: 10 INJECTION, SOLUTION INTRAVENOUS at 15:26

## 2024-10-31 RX ADMIN — GABAPENTIN 600 MG: 300 CAPSULE ORAL at 05:59

## 2024-10-31 RX ADMIN — ROCURONIUM BROMIDE 50 MG: 50 INJECTION, SOLUTION INTRAVENOUS at 08:54

## 2024-10-31 RX ADMIN — Medication 0.15 MG: at 07:20

## 2024-10-31 RX ADMIN — PROPOFOL 25 MCG/KG/MIN: 10 INJECTION, EMULSION INTRAVENOUS at 08:08

## 2024-10-31 RX ADMIN — ONDANSETRON 4 MG: 2 INJECTION INTRAMUSCULAR; INTRAVENOUS at 21:50

## 2024-10-31 RX ADMIN — HYOSCYAMINE SULFATE 125 MCG: 0.12 TABLET SUBLINGUAL at 20:56

## 2024-10-31 RX ADMIN — ACETAMINOPHEN 975 MG: 325 TABLET ORAL at 05:58

## 2024-10-31 RX ADMIN — HYDROMORPHONE HYDROCHLORIDE 0.5 MG: 1 INJECTION, SOLUTION INTRAMUSCULAR; INTRAVENOUS; SUBCUTANEOUS at 09:56

## 2024-10-31 RX ADMIN — GABAPENTIN 250 MG: 250 SUSPENSION ORAL at 21:12

## 2024-10-31 RX ADMIN — KETOROLAC TROMETHAMINE 15 MG: 15 INJECTION, SOLUTION INTRAMUSCULAR; INTRAVENOUS at 20:56

## 2024-10-31 RX ADMIN — ENOXAPARIN SODIUM 40 MG: 40 INJECTION SUBCUTANEOUS at 21:12

## 2024-10-31 RX ADMIN — FENTANYL CITRATE 100 MCG: 50 INJECTION, SOLUTION INTRAMUSCULAR; INTRAVENOUS at 07:59

## 2024-10-31 RX ADMIN — Medication 3 G: at 08:07

## 2024-10-31 RX ADMIN — PROPOFOL 200 MG: 10 INJECTION, EMULSION INTRAVENOUS at 08:04

## 2024-10-31 RX ADMIN — ALBUMIN HUMAN: 0.05 INJECTION, SOLUTION INTRAVENOUS at 09:43

## 2024-10-31 RX ADMIN — POTASSIUM CHLORIDE, DEXTROSE MONOHYDRATE AND SODIUM CHLORIDE: 150; 5; 450 INJECTION, SOLUTION INTRAVENOUS at 11:37

## 2024-10-31 ASSESSMENT — ACTIVITIES OF DAILY LIVING (ADL)
ADLS_ACUITY_SCORE: 0

## 2024-10-31 NOTE — PROGRESS NOTES
Pt had 60 ml red bloody emesis, VSSPearl paged, orders to make pt NPO to call if pt became tachycardic. Pearl to see patient .  Will continue to monitor .   Pearl saw pt, pt to start liquids again in 2 hours at 2000, stop if pt becomes nauseated and try again in 2 hours.

## 2024-10-31 NOTE — PROGRESS NOTES
Care Plan Progress Note:    Name: Dk Akins  :   1985  MRN:   [id number]    Problem: Bariatric Procedure  Goal: Prevent post-op bariatric complications.  Appropriate oral intake.  Discharge needs are met.  Intervention:   Post Op Day 0/1 (progress as patient tolerates)   -Notify MD of excessive nausea   -NPO per MD orders; read exceptions to the order   -[place] with 1/2 inch warm water at bedside until able to take PO   -Do not give ice chips, straws, or carbonation    -Whenever drinking liquids, patient must be upright with both feet on the floor   -No more than 30mL per sip   -All liquids must be at approximately room temperature (no extreme temperatures).   -After 2 hours of 30mL PO q30min, you may take 30mL as tolerated and advance to a clear liquid diet    -No oral pills until after the patient tolerates the 2 hours of 30mL sips (exceptions: sublingual medications can be given anytime; liquid gabapentin can be given after 1 hours of sips)   -Strict intake and output   -Bladder scan every 4 hours until voiding freely   -If tolerating sips, start bariatric clear liquid diet   -If tolerating bariatric clears, advance to a bariatric full liquid diet  Discharge Planning   -Educate patient about pill size   -Patient should take no pill greater than 1/4 inch   -Send pill cutter home with patient   -Nurse to review home discharge instructions  Outcome:    Shift  Intake:  Output:straight cathed for 1100  Bladder Scan: 735 in PACU straight cathed for 1100 on arrival to floor   Pain:3/10  Incision: clean dry intact   Nausea:none  Activity:  Incentive Spirometry:2250  Abdominal Binder: off    Stacy Martino RN  10/31/2024  4:14 PM

## 2024-10-31 NOTE — OP NOTE
[facility]  Operative Note    Pre-operative diagnosis: Morbid obesity with BMI of 50.0-59.9, adult (H) [E66.01, Z68.43]   Post-operative diagnosis morbid obesity   Procedure: Procedure(s):  DIVERSION, BILIOPANCREATIC, ROBOT-ASSISTED, LAPAROSCOPIC, WITH SINGLE ANASTOMOSIS DUODENAL SWITCH, USING DA JACEK XI   Surgeon: Maxi Rolle MD   Assistants(s): The assistance of Pearl Buchanan CNP, was necessary for retraction and exposure during the course of the case.   Anesthesia: General with Block    Estimated blood loss: Less than 50 ml        Operative Indication:  Dk Akins has a Body mass index is 53.63 kg/m . with associated co morbidities including HTN, LATOYA requiring BIPAP. he has attempted weight loss through medical management, diet, and exercise alone without longterm success.  he is therefore pursuing bariatric surgery as a means of achieving long term weight loss and resolution of his bariatric comorbidities.       Drains: None   Specimens: Sleeve gastrectomy       Findings: None.   Complications: None.           Description of procedure:    The patient was brought to the operating room where after induction of general anesthesia with endotracheal intubation, they were positioned with both arms out.  After a procedural pause, we began by injecting quarter percent Marcaine and the skin 20 cm inferior to the xiphoid process and just to the left of midline.  This was then sharply incised and access to the abdomen gained with a  varies needle.  The abdomen was insufflated.  We then proceeded to place 2 8 and 112 mm trocar across the mid abdomen with the third 8 mm trocar placed at the insufflation site.    An incision was made in the epigastric region for placement of the Yusef liver retractor.  Bilateral TAP blocks were performed under visual guidance with 0.25% marcaine, 25 cc per side.  The da Jacek X Xi robot was then docked.    We began by identifying the cecum.  From here  we identified the terminal ileum, and measured this proximally 300 cm.  This was then sutured to the omentum just inferior to the pylorus with a loose 2-0 silk suture.  An additional marking stitch was placed on the proximal small bowel limb to indicate the proximal small bowel.    We next turned our attention to dissection of the stomach.  On initial survey of the stomach appeared normal with no evidence of a hiatal hernia.  We began our dissection by dividing the omentum from the greater curvature of the stomach with the Vessel sealer.  Proximally this dissection was carried up along the greater curvature of the stomach, dividing the short gastric vessels and posterior adhesions between the stomach and retroperitoneum until we reached the angle of His.  The left branch of the diaphragmatic bijan was identified.  Distally, we then proceeded to divide the omentum from the inferior edge of the antrum, pylorus, and first portion of the duodenum.  The duodenum was mobilized from the underlying pancreas until we were distal to the right gastric artery, approximately 2-3 cm distal to the pylorus.  Once this was fully mobilized, we then proceeded to divide the peritoneal lining over the superolateral border of the bulb of the duodenum.  Once this was complete, we were able to pass a grasper posterior to the duodenal bulb, creating a window posterior to the bulb and first portion of the duodenum.  Satisfied with our mobilization of the stomach, we then had our anesthesia colleagues advance a 32 Greenlandic bougie into the stomach where was then manipulated until it lay flush against the lesser curvature stomach, terminating distal to the pylorus.      We then proceeded to begin dividing the stomach, first with a 60 mm blue load stapler which was positioned adjacent to the red rubber catheter across the antrum, with approximately 1 cm of distance between the lateral edge of the catheter and medial edge of the stapler.  Adequate  space at the incisura was ensured.  After firing the staple load, we then proceeded to perform the remainder of the sleeve gastrectomy, dividing the stomach with 1 additional blue load and the remaining white load staples.  For each firing, we ensured there is approximately 1 cm of space between the lateral aspect of the catheter and medial aspect of the stapler.  No staple line reinforcement was utilized.  The staple line was not over sewn.  A total of 6 staple loads were utilized.  Sites of visible oozing were then cauterized with bipolar cautery.  With the stomach thus fully resected, we inspected our staple line which appeared hemostatic.    We next turned our attention to division of the duodenum.  A 60 mm white load stapler was brought in through the left upper quadrant port, and utilized to divide the duodenum approximately 3 cm distal to the pylorus.  Utilizing a 3-0 V-Loc suture, the duodenum was then affixed to the small bowel at the 300 cm emmanuel in running fashion, oversewing the staple line.  2 opposing enterotomies were then made with cautery and a stapled anastomosis created with a white load stapler, roughly 30 mm in length.  The common enterotomy was then closed with a running 2-0 vicryl suture.  Once the anastomosis was completed the proximal and distal limbs of the small bowel were clamped and ICG was instilled through the orogastric tube for a leak test; no leak was identified.    Proceeded to pexy the proximal limb of small bowel to the antrum of the stomach with a 2-0 silk suture.  At this point the instruments were then removed, and the da Jacek XI robot was undocked. The resected stomach was then removed from the abdominal cavity through the 15 mm port site.  This fascial defect was then closed with a interrupted 0 Vicryl suture.  The remainder of the local anesthetic was then injected into the skin and fascia surrounding the port sites.  The Yusef liver retractor was removed under direct  visualization and insufflation then released.  The ports were then removed and the skin closed with interrupted 4-0 Vicryl sutures.      Maxi Rolle MD, FACS  Office: [phone]  [facility] and Bariatric Surgery

## 2024-10-31 NOTE — INTERVAL H&P NOTE
"I have reviewed the surgical (or preoperative) H&P that is linked to this encounter, and examined the patient. There are no significant changes    Maxi Rolle MD, [facility]  Office: [phone]  [facility]   General and Bariatric Surgery      Clinical Conditions Present on Arrival:  Clinically Significant Risk Factors Present on Admission                       # Severe Obesity: Estimated body mass index is 53.63 kg/m  as calculated from the following:    Height as of 10/11/24: 1.844 m (6' 0.6\").    Weight as of this encounter: 182.3 kg (402 lb).       "

## 2024-10-31 NOTE — ANESTHESIA CARE TRANSFER NOTE
Patient: Dk Akins    Procedure: Procedure(s):  DIVERSION, BILIOPANCREATIC, ROBOT-ASSISTED, LAPAROSCOPIC, WITH SINGLE ANASTOMOSIS DUODENAL SWITCH, USING DA BRANDY XI       Diagnosis: Morbid obesity with BMI of 50.0-59.9, adult (H) [E66.01, Z68.43]  Diagnosis Additional Information: No value filed.    Anesthesia Type:   General     Note:    Oropharynx: oropharynx clear of all foreign objects  Level of Consciousness: drowsy  Oxygen Supplementation: face mask  Level of Supplemental Oxygen (L/min / FiO2): 8  Independent Airway: airway patency satisfactory and stable  Dentition: dentition unchanged  Vital Signs Stable: post-procedure vital signs reviewed and stable  Report to RN Given: handoff report given  Patient transferred to: PACU    Handoff Report: Identifed the Patient, Identified the Reponsible Provider, Reviewed the pertinent medical history, Discussed the surgical course, Reviewed Intra-OP anesthesia mangement and issues during anesthesia, Set expectations for post-procedure period and Allowed opportunity for questions and acknowledgement of understanding      Vitals:  Vitals Value Taken Time   /71 10/31/24 1100   Temp 36.0    Pulse 57 10/31/24 1104   Resp 19 10/31/24 1104   SpO2 100 % 10/31/24 1104   Vitals shown include unfiled device data.    Electronically Signed By: MART Alejo CRNA  October 31, 2024  11:06 AM

## 2024-10-31 NOTE — ANESTHESIA POSTPROCEDURE EVALUATION
Patient: Dk Akins    Procedure: Procedure(s):  DIVERSION, BILIOPANCREATIC, ROBOT-ASSISTED, LAPAROSCOPIC, WITH SINGLE ANASTOMOSIS DUODENAL SWITCH, USING DA BRANDY XI       Anesthesia Type:  General    Note:  Disposition: Admission   Postop Pain Control: Uneventful            Sign Out: Well controlled pain   PONV: No   Neuro/Psych: Uneventful            Sign Out: Acceptable/Baseline neuro status   Airway/Respiratory: Uneventful            Sign Out: Acceptable/Baseline resp. status (Pt awake, has been using BiPAP in PACU.)   CV/Hemodynamics: Uneventful            Sign Out: Acceptable CV status; No obvious hypovolemia; No obvious fluid overload   Other NRE: NONE   DID A NON-ROUTINE EVENT OCCUR? No         Last vitals:  Vitals Value Taken Time   /74 10/31/24 1331   Temp     Pulse 66 10/31/24 1411   Resp 22 10/31/24 1411   SpO2 94 % 10/31/24 1407   Vitals shown include unfiled device data.    Electronically Signed By: Francy Torres MD  October 31, 2024  3:36 PM

## 2024-10-31 NOTE — ANESTHESIA PREPROCEDURE EVALUATION
Anesthesia Pre-Procedure Evaluation    Patient: Dk Akins   MRN: [id number] : 1985        Procedure : Procedure(s):  DIVERSION, BILIOPANCREATIC, ROBOT-ASSISTED, LAPAROSCOPIC, WITH SINGLE ANASTOMOSIS DUODENAL SWITCH, USING DA BRANDY XI          Past Medical History:   Diagnosis Date     Anxiety      Bone cyst      Dyslipidemia      Femur fracture (H)     x4     History of alcohol use disorder     Quit 2023     History of tobacco use     [age]     Humerus fracture 2000     Hypertension      Knee pain      Metabolic syndrome      Morbid obesity (H)      LATOYA (obstructive sleep apnea)     Uses Bipap     Primary hypertension       Past Surgical History:   Procedure Laterality Date     HC OPEN TX BIMALLEOLAR ANKLE FX, INCL INTERNAL FIXATION      Description: Open Treatment Of Bimalleolar Ankle Fracture;  Recorded: 2012;     KNEE SURGERY        No Known Allergies   Social History     Tobacco Use     Smoking status: Former     Types: Cigars     Smokeless tobacco: Never     Tobacco comments:     marijuana, not tobacco   Substance Use Topics     Alcohol use: Not Currently     Comment: Sober since 2023      Wt Readings from Last 1 Encounters:   10/11/24 (!) 185 kg (407 lb 12.8 oz)        Anesthesia Evaluation   Pt has had prior anesthetic.     No history of anesthetic complications       ROS/MED HX  ENT/Pulmonary:     (+) sleep apnea (BiPAP),               tobacco use, Past use,                       Neurologic:  - neg neurologic ROS     Cardiovascular:     (+) Dyslipidemia hypertension- -   -  - -                                      METS/Exercise Tolerance: >4 METS    Hematologic:  - neg hematologic  ROS     Musculoskeletal:  - neg musculoskeletal ROS     GI/Hepatic:  - neg GI/hepatic ROS     Renal/Genitourinary:  - neg Renal ROS     Endo: Comment: Metabolic syndrome    (+)               Obesity (BMI 54),       Psychiatric/Substance Use:     (+) psychiatric history anxiety alcohol abuse  "(Quit 3/2023)      Infectious Disease:       Malignancy:       Other:            Physical Exam    Airway      Comment: Supermorbid obesity    Mallampati: III   TM distance: > 3 FB   Neck ROM: full   Mouth opening: > 3 cm    Respiratory Devices and Support         Dental     Comment: Good        Cardiovascular   cardiovascular exam normal          Pulmonary   pulmonary exam normal            OUTSIDE LABS:  CBC:   Lab Results   Component Value Date    WBC 6.3 10/11/2024    WBC 8.4 01/25/2024    HGB 14.0 10/11/2024    HGB 14.6 01/25/2024    HCT 40.8 10/11/2024    HCT 41.5 01/25/2024     10/11/2024     01/25/2024     BMP:   Lab Results   Component Value Date     10/11/2024     01/25/2024    POTASSIUM 3.9 10/11/2024    POTASSIUM 3.8 01/25/2024    CHLORIDE 106 10/11/2024    CHLORIDE 104 01/25/2024    CO2 22 10/11/2024    CO2 26 01/25/2024    BUN 11.6 10/11/2024    BUN 16.6 01/25/2024    CR 0.61 (L) 10/11/2024    CR 0.86 01/25/2024     (H) 10/11/2024     (H) 01/25/2024     COAGS:   Lab Results   Component Value Date    PTT 29 10/11/2024    INR 1.03 10/11/2024     POC: No results found for: \"BGM\", \"HCG\", \"HCGS\"  HEPATIC:   Lab Results   Component Value Date    ALBUMIN 4.0 10/11/2024    PROTTOTAL 7.0 10/11/2024    ALT 43 10/11/2024    AST 27 10/11/2024    ALKPHOS 58 10/11/2024    BILITOTAL 0.5 10/11/2024     OTHER:   Lab Results   Component Value Date    A1C 5.5 01/25/2024    KAROL 9.3 10/11/2024    TSH 1.51 05/10/2023    CRP 1.1 (H) 07/31/2020       Anesthesia Plan    ASA Status:  4    NPO Status:  NPO Appropriate    Anesthesia Type: General.     - Airway: ETT   Induction: Intravenous, Propofol.   Maintenance: Balanced.   Techniques and Equipment:     - Airway: Video-Laryngoscope     - Lines/Monitors: 2nd IV     Consents    Anesthesia Plan(s) and associated risks, benefits, and realistic alternatives discussed. Questions answered and patient/representative(s) expressed understanding.    " " - Discussed: Risks, Benefits and Alternatives for BOTH SEDATION and the PROCEDURE were discussed     - Discussed with:  Patient      - Extended Intubation/Ventilatory Support Discussed: No.      - Patient is DNR/DNI Status: No     Use of blood products discussed: Yes.     - Discussed with: Patient.     - Consented: consented to blood products     Postoperative Care    Pain management: Multi-modal analgesia, intrathecal morphine.     - Plan for long acting post-op opioid use   PONV prophylaxis: Ondansetron (or other 5HT-3), Dexamethasone or Solumedrol, Background Propofol Infusion     Comments:    Other Comments: Pt agrees to ITN for post op pain per surgeon's request    Glidescope  RSI    Magnesium  Background propofol  Precedex gtt  Acetaminophen    Reverse with Sugammadex         Francy Torres MD    I have reviewed the pertinent notes and labs in the chart from the past 30 days and (re)examined the patient.  Any updates or changes from those notes are reflected in this note.               # Hypertension: Noted on problem list         # Severe Obesity: Estimated body mass index is 54.4 kg/m  as calculated from the following:    Height as of 10/11/24: 1.844 m (6' 0.6\").    Weight as of 10/11/24: 185 kg (407 lb 12.8 oz).             "

## 2024-10-31 NOTE — PHARMACY-ADMISSION MEDICATION HISTORY
Pharmacist Admission Medication History    Admission medication history is complete. The information provided in this note is only as accurate as the sources available at the time of the update.    Information Source(s): Patient via in-person    Pertinent Information: Patient has post procedure medications.    Changes made to PTA medication list:  Added: None  Deleted: None  Changed: None    Allergies reviewed with patient and updates made in EHR: yes    Medication History Completed By: Alexandro Powers [facility] 10/31/2024 6:35 AM    PTA Med List   Medication Sig Last Dose/Taking    atenolol-chlorthalidone (TENORETIC) 50-25 MG tablet Take 1 tablet by mouth daily 10/30/2024 Morning    ibuprofen (ADVIL/MOTRIN) 200 MG tablet Take 800 mg by mouth every 6 hours as needed for pain. Past Week

## 2024-10-31 NOTE — ANESTHESIA PROCEDURE NOTES
"Intrathecal injection Procedure Note    Pre-Procedure   Staff -        Anesthesiologist:  Francy Torres MD       Performed By: anesthesiologist       Location: pre-op       Procedure Start/Stop Times: 10/31/2024 7:12 AM and 10/31/2024 7:20 AM       Pre-Anesthestic Checklist: patient identified, IV checked, risks and benefits discussed, informed consent, monitors and equipment checked, pre-op evaluation, at physician/surgeon's request and post-op pain management  Timeout:       Correct Patient: Yes        Correct Procedure: Yes        Correct Site: Yes        Correct Position: Yes   Procedure Documentation  Procedure: intrathecal injection       Patient Position: sitting       Patient Prep/Sterile Barriers: sterile gloves, mask, patient draped       Skin prep: Chloraprep       Insertion Site: L3-4. (midline approach).       Needle Gauge: 24.        Needle Length (Inches): 4        Introducer used       Introducer: 20 G       # of attempts: 2 and  # of redirects:  0    Assessment/Narrative         Paresthesias: No.       CSF fluid: clear.    Medication(s) Administered   Morphine PF 1 mg/mL (Intrathecal) - Intrathecal   0.15 mg - 10/31/2024 7:20:00 AM  Medication Administration Time: 10/31/2024 7:12 AM      FOR [facility] ONLY:   Pain Team Contact information: please page the Pain Team Via [employer]. Search \"Pain\". During daytime hours, please page the attending first. At night please page the resident first.      "
Airway         Procedure Start/Stop Times: 10/31/2024 8:06 AM  Staff -        CRNA: Meghana Arzola APRN CRNA       Performed By: CRNA  Consent for Airway        Urgency: elective  Indications and Patient Condition       Indications for airway management: deondre-procedural       Induction type:RSI       Mask difficulty assessment: 0 - not attempted    Final Airway Details       Final airway type: endotracheal airway       Successful airway: ETT - single  Endotracheal Airway Details        ETT size (mm): 8.0       Cuffed: yes       Successful intubation technique: video laryngoscopy       VL Blade Size: Glidescope 4       Grade View of Cords: 1       Adjucts: stylet       Position: Right       Measured from: gums/teeth       Secured at (cm): 24       Bite block used: None    Post intubation assessment        Placement verified by: capnometry and equal breath sounds        Number of attempts at approach: 1       Number of other approaches attempted: 0       Secured with: tape       Ease of procedure: easy       Dentition: Intact    Medication(s) Administered   Medication Administration Time: 10/31/2024 8:06 AM        
no

## 2024-10-31 NOTE — PROGRESS NOTES
PT arrived to PACU and placed on hospital bipap, as PT didn't bring his from home. Initial settings are: S/T 16 RR, 14/6, 30% Fi02. PT tolerating well. RT will continue to follow.    Zion Garcia, RT on 10/31/2024 at 11:21 AM

## 2024-11-01 VITALS
HEART RATE: 55 BPM | WEIGHT: 315 LBS | BODY MASS INDEX: 42.66 KG/M2 | DIASTOLIC BLOOD PRESSURE: 76 MMHG | RESPIRATION RATE: 20 BRPM | HEIGHT: 72 IN | OXYGEN SATURATION: 97 % | SYSTOLIC BLOOD PRESSURE: 122 MMHG | TEMPERATURE: 97.8 F

## 2024-11-01 LAB
PATH REPORT.COMMENTS IMP SPEC: NORMAL
PATH REPORT.COMMENTS IMP SPEC: NORMAL
PATH REPORT.FINAL DX SPEC: NORMAL
PATH REPORT.GROSS SPEC: NORMAL
PATH REPORT.MICROSCOPIC SPEC OTHER STN: NORMAL
PATH REPORT.RELEVANT HX SPEC: NORMAL
PHOTO IMAGE: NORMAL

## 2024-11-01 PROCEDURE — 250N000011 HC RX IP 250 OP 636: Performed by: NURSE PRACTITIONER

## 2024-11-01 PROCEDURE — 258N000003 HC RX IP 258 OP 636: Performed by: NURSE PRACTITIONER

## 2024-11-01 PROCEDURE — 250N000013 HC RX MED GY IP 250 OP 250 PS 637: Performed by: NURSE PRACTITIONER

## 2024-11-01 RX ORDER — ATENOLOL 50 MG/1
50 TABLET ORAL DAILY
Qty: 14 TABLET | Refills: 0 | Status: SHIPPED | OUTPATIENT
Start: 2024-11-02 | End: 2024-11-16

## 2024-11-01 RX ORDER — GABAPENTIN 250 MG/5ML
250 SOLUTION ORAL EVERY 8 HOURS
Qty: 60 ML | Refills: 0 | Status: CANCELLED | OUTPATIENT
Start: 2024-11-01

## 2024-11-01 RX ORDER — TRAMADOL HYDROCHLORIDE 100 MG/1
100 TABLET, COATED ORAL EVERY 6 HOURS PRN
Qty: 10 TABLET | Refills: 0 | Status: SHIPPED | OUTPATIENT
Start: 2024-11-01 | End: 2024-11-12

## 2024-11-01 RX ORDER — ATENOLOL AND CHLORTHALIDONE TABLET 50; 25 MG/1; MG/1
1 TABLET ORAL DAILY
COMMUNITY
Start: 2024-11-01

## 2024-11-01 RX ADMIN — KETOROLAC TROMETHAMINE 15 MG: 15 INJECTION, SOLUTION INTRAMUSCULAR; INTRAVENOUS at 06:23

## 2024-11-01 RX ADMIN — ACETAMINOPHEN 1000 MG: 10 INJECTION, SOLUTION INTRAVENOUS at 03:32

## 2024-11-01 RX ADMIN — HYOSCYAMINE SULFATE 125 MCG: 0.12 TABLET SUBLINGUAL at 03:32

## 2024-11-01 RX ADMIN — Medication 1 TABLET: at 09:34

## 2024-11-01 RX ADMIN — OMEPRAZOLE 20 MG: 20 CAPSULE, DELAYED RELEASE ORAL at 09:33

## 2024-11-01 RX ADMIN — Medication 1000 MCG: at 07:57

## 2024-11-01 RX ADMIN — HYOSCYAMINE SULFATE 125 MCG: 0.12 TABLET SUBLINGUAL at 12:09

## 2024-11-01 RX ADMIN — HYOSCYAMINE SULFATE 125 MCG: 0.12 TABLET SUBLINGUAL at 07:57

## 2024-11-01 RX ADMIN — ACETAMINOPHEN 975 MG: 325 SOLUTION ORAL at 11:12

## 2024-11-01 RX ADMIN — KETOROLAC TROMETHAMINE 15 MG: 15 INJECTION, SOLUTION INTRAMUSCULAR; INTRAVENOUS at 12:09

## 2024-11-01 RX ADMIN — POTASSIUM CHLORIDE, DEXTROSE MONOHYDRATE AND SODIUM CHLORIDE: 150; 5; 450 INJECTION, SOLUTION INTRAVENOUS at 03:18

## 2024-11-01 RX ADMIN — ATENOLOL 50 MG: 25 TABLET ORAL at 09:33

## 2024-11-01 RX ADMIN — HYOSCYAMINE SULFATE 125 MCG: 0.12 TABLET SUBLINGUAL at 00:19

## 2024-11-01 RX ADMIN — KETOROLAC TROMETHAMINE 15 MG: 15 INJECTION, SOLUTION INTRAMUSCULAR; INTRAVENOUS at 00:19

## 2024-11-01 ASSESSMENT — ACTIVITIES OF DAILY LIVING (ADL)
ADLS_ACUITY_SCORE: 0

## 2024-11-01 NOTE — PROGRESS NOTES
Bariatric Surgery Progress Note    1 Day Post-Op    Procedure(s):  DIVERSION, BILIOPANCREATIC, ROBOT-ASSISTED, LAPAROSCOPIC, WITH SINGLE ANASTOMOSIS DUODENAL SWITCH, USING DA BRANDY XI    Subjective:   Patient reports pain is controlled. Patient has attempted to start his initial sips twice yesterday and had emesis after 3 sips each time. Patient reports this also correlated with administration of gabapentin. He states that he immediately felt nauseous after taken the gabapentin and vomited within 30 minutes of it's administration both times. Patient denies fever, chills, dizziness, blurred vision, headache, nausea, vomiting, SOB, CP, and leg pain. No tachycardia, diaphoresis or hypotension noted    Patient Vitals for the past 24 hrs:   BP Temp Temp src Pulse Resp SpO2 Height Weight   11/01/24 0724 122/76 97.8  F (36.6  C) Axillary 55 20 97 % -- --   11/01/24 0412 (!) 141/90 98.1  F (36.7  C) Oral 83 20 -- -- --   11/01/24 0026 (!) 147/68 97.5  F (36.4  C) Oral 59 18 95 % -- --   10/31/24 2007 (!) 158/81 98.4  F (36.9  C) Oral 59 20 96 % -- --   10/31/24 1722 (!) 149/69 -- Oral 90 22 93 % -- --   10/31/24 1540 133/82 97.7  F (36.5  C) Oral 70 20 94 % -- --   10/31/24 1510 (!) 156/93 -- Oral 86 20 93 % -- --   10/31/24 1440 136/86 -- -- 70 20 91 % -- --   10/31/24 1427 (!) 163/91 98.1  F (36.7  C) Oral 70 18 95 % -- --   10/31/24 1423 -- -- -- -- -- -- 1.829 m (6') (!) 182.4 kg (402 lb 3.2 oz)   10/31/24 1330 132/74 -- -- 65 17 94 % -- --   10/31/24 1300 (!) 143/74 -- -- 61 17 94 % -- --   10/31/24 1245 (!) 145/86 -- -- 59 17 97 % -- --   10/31/24 1230 (!) 140/86 -- -- 57 17 97 % -- --   10/31/24 1215 135/82 -- -- 58 15 98 % -- --   10/31/24 1200 (!) 145/87 -- -- 56 16 98 % -- --   10/31/24 1145 (!) 140/85 -- -- 62 17 99 % -- --   10/31/24 1130 (!) 141/88 -- -- 58 14 98 % -- --   10/31/24 1115 139/75 -- -- 63 15 94 % -- --   10/31/24 1100 138/71 -- -- 69 20 97 % -- --       Physical Exam:  General: A/O, NAD  Ab:        Soft, + BS, expected ttp POD 1; The wound/ dressings are clean, dry, and intact  :      Patient is voiding adequate amount    Admission on 10/31/2024   Component Date Value    GLUCOSE BY METER POCT 10/31/2024 111 (H)        Assessment/Plan:   Patient is progressing slowly after surgery. He unfortunately has not made it through his initial 2 hour sip trials without vomiting and has not been able to progress to a bariatric clear liquid diet. Given the correlation of vomiting after gabapentin, we will discontinue the gabapentin and restart his sip trial. If he tolerates and is able to advance his diet, we will educate him and discharge him later this afternoon.    Pearl Buchanan, CNP  [phone]  [facility] General and Bariatric Surgery     Addendum:  Patient seen and doing better this afternoon. He is meeting all discharge goals.    Discussed discharge instructions with the patient along with signs and symptoms to watch for post-op.  Patient verbalized understanding of the plan, including:    - Use of incentive spirometer and walking as tolerated   - Use of abdominal binder if needed   - Importance of getting 48-64 ounces of water in daily for hydration    - Use of medication cups for measuring out sips for the next 3-4 days.   - Bariatric full liquid diet for the next week.   - Drink a protein shake providing 20-30 grams of protein in addition to meals daily; aim for a total of 40 grams of protein daily   - Attend the post-op dietary class next week   - Take omeprazole daily for the next 3 months and avoid/eliminate NSAID usage   - Take chewable MVI with 18mg iron twice a day and SL B12 1,000-2,500 mcg daily.    - Call Bariatric clinic with any questions or concerns   - If patient needs to be seen in the emergency department for any reason, he needs to return to [facility].    Pt is scheduled to follow up at Bariatric Clinic next week.  Patient verbalized understanding of the plan. Bariatric nurse  clinician will call him in a couple days when he gets home to check in with him.    Greater than 45 minutes were spent with this patient with more than 50% of that time spent on education.    Pearl Buchanan, CNP  [phone]  [facility]

## 2024-11-01 NOTE — PROVIDER NOTIFICATION
Nurse updated Pearl Buchanan NP, pt tolerated 3 sips then had 100cc brown emesis. PRN zofran effective. Pt currently not nauseous.     -Per Pearl, keep NPO until morning then restart sips.

## 2024-11-01 NOTE — DISCHARGE SUMMARY
Bariatric Surgery Discharge Summary    Primary Care Physician:  Bridgette Peace    Discharge Provider: MART Crawford CNP  Admission Date: 10/31/2024  Discharge Date: November 1, 2024     Primary Diagnosis at Discharge:   Patient Active Problem List   Diagnosis    Closed Fracture Of The Patella    Bone cyst    Primary hypertension    Morbid obesity (H)    Obstructive sleep apnea syndrome    Alcohol consumption binge drinking    Alcohol dependence with unspecified alcohol-induced disorder (H)    Dyslipidemia    Metabolic syndrome    History of alcohol use disorder    Knee pain    Morbid (severe) obesity due to excess calories (H)      Disposition: Home   Condition at Discharge: Stable     Surgery: Robotic Assisted Sleeve Gastrectomy with Single Anastomosis Duodenal Switch     Hospital Summary:   Dk Akins was admitted for morbid obesity.  he underwent an uncomplicated laparoscopic sleeve gastrectomy with single anastomosis duodenal switch.  Following a brief recovery in the PACU, he was transferred to the Med/Surg floor for the remainder of his stay.  his post operative course was uneventful.  On POD # 1 he was discharged home tolerating a full liquid diet, ambulating without assistance, and pain controlled with oral analgesics.        Discharge Medications:      Medication List        Started      atenolol 50 MG tablet  Commonly known as: TENORMIN  50 mg, Oral, DAILY, Then restart your atenolol-chlorthalidone combination  Start taking on: November 2, 2024     hyoscyamine 0.125 MG sublingual tablet  Commonly known as: LEVSIN/SL  125 mcg, Sublingual, EVERY 4 HOURS PRN     traMADol HCl 100 MG Tabs  100 mg, Oral, EVERY 6 HOURS PRN     Tylenol Dissolve Packs 500 MG Pack  Generic drug: Acetaminophen  1,000 mg, Oral, EVERY 6 HOURS            Modified      atenolol-chlorthalidone 50-25 MG tablet  Commonly known as: TENORETIC  What changed: additional instructions            Discontinued      ibuprofen 200 MG  tablet  Commonly known as: ADVIL/MOTRIN              Discharge Instructions:  Follow up appointment with Primary Care Physician: Bridgette Peace  Follow up appointment with Dr. Rolle in 2 weeks  Attend the post-op dietary class as scheduled    Post operative instructions:   Diet:     For one week following your surgery or until you meet with the dietician, you will be on a full liquid diet.  It is extremely important to follow the liquid diet to allow for optimal healing and to prevent food from becoming lodged at the gastric outlet.    Protein is an important part of the diet after surgery; therefore, it is necessary to drink fluids that are high in protein.  Proteins are building blocks that help you heal after surgery and help preserve your muscle mass while you are losing weight.      Including carbohydrates in the diet is also important after surgery to prevent your body from burning fat for energy (Ketosis).  These carbohydrates include: unsweetened applesauce, blended canned fruit (in its own juice), Stage I baby food fruit, thin cream of wheat, light yogurt (no fruit chunks), or 100% fruit juice diluted (50% juice/50% water).     Remember to take 1 Multivitamin with Iron 2 times daily and 1000 mcg of Sublingual B-12 daily.       Aim for 40-50 grams of protein daily during this week.    Sip 48-64 ounces of liquid per day in addition to full liquid meals/supplements.    After 2 weeks of the full liquid diet, you will advance to the pureed diet, as instructed.    Activity: You should continue to be active at home including ambulating frequently.  If possible try to limit the amount of time spent in bed.    Restrictions: you should avoid lifting anything more than 20 pounds or strenuous physical activity for a total of 2 weeks.        MART Harrington [facility]  [phone]  [facility] General and Bariatric Surgery

## 2024-11-01 NOTE — CARE PLAN
Care Plan Progress Note:    Name: Dk Akins  :   1985  MRN:   [id number]    Problem: Bariatric Procedure  Goal: Prevent post-op bariatric complications.  Appropriate oral intake.  Discharge needs are met.  Intervention:   Post Op Day 0/1 (progress as patient tolerates)   -Notify MD of excessive nausea   -NPO per MD orders; read exceptions to the order   -Toothette with 1/2 inch warm water at bedside until able to take PO   -Do not give ice chips, straws, or carbonation    -Whenever drinking liquids, patient must be upright with both feet on the floor   -No more than 30mL per sip   -All liquids must be at approximately room temperature (no extreme temperatures).   -After 2 hours of 30mL PO q30min, you may take 30mL as tolerated and advance to a clear liquid diet    -No oral pills until after the patient tolerates the 2 hours of 30mL sips (exceptions: sublingual medications can be given anytime; liquid gabapentin can be given after 1 hours of sips)   -Strict intake and output   -Bladder scan every 4 hours until voiding freely   -If tolerating sips, start bariatric clear liquid diet   -If tolerating bariatric clears, advance to a bariatric full liquid diet  Discharge Planning   -Educate patient about pill size   -Patient should take no pill greater than 1/4 inch   -Send pill cutter home with patient   -Nurse to review home discharge instructions  Outcome:    Shift  Intake:0  Output:1050  Bladder Scan: voiding freely  Pain:denies pain  Incision:CDI  Nausea:denies  Activity:SBA/IND  Incentive Spirometry:2200  Abdominal Binder:off    Mario Morejon RN  2024  6:48 AM      Pt AxO x4, VSS on RA, denies pain/nausea outside of attempting sips. Up IND/SBA. IV infusing. Will reattempt sips this AM

## 2024-11-01 NOTE — PLAN OF CARE
Patient advanced to Full Liquid diet after achieving 500ml clear liquids. Tolerating protein shake. Voiding freely.    Discharged home via family transport with home medications.

## 2024-11-01 NOTE — DISCHARGE INSTRUCTIONS
If you are worried about whether or not you need to be seen or if something is wrong, please call your bariatric nurse line at [phone] or the bariatric clinic at [phone]. If you need to be seen in the emergency department for any reason in the next 30 days, please return to [facility]. The bariatric team should be involved in your care/diet even if the problem is unrelated to your surgery.

## 2024-11-04 ENCOUNTER — TELEPHONE (OUTPATIENT)
Dept: SURGERY | Facility: CLINIC | Age: 39
End: 2024-11-04
Payer: COMMERCIAL

## 2024-11-04 ENCOUNTER — PATIENT OUTREACH (OUTPATIENT)
Dept: CARE COORDINATION | Facility: CLINIC | Age: 39
End: 2024-11-04
Payer: COMMERCIAL

## 2024-11-04 NOTE — TELEPHONE ENCOUNTER
Post-Op Phone Call  [facility] Bariatric Care    Surgeon: Maxi Rolle MD.  Date of Surgery: 10/31/2024  Discharge Date: 11/1/2024    Date/Time Called:   Date: 11/4/2024 Time: 2:47 PM   Attempt: First    Pain Control:  Intensity: Mild (1 - 3)  Duration/Location/Explain: general incisions and just feeling overall fullness.  What makes it better/worse? Not using the abdominal binder but the Tylenol and tramadol at bedtime    Medications:  Narcotic Use - Yes - Tramadol  Drug type: Gabapentin - not taking, made vomit in hospital    Non-prescription pain control: Tylenol    Other medications currently taking: See medication list for details - medication list reviewed    Complete Multivitamin + Iron BID? Yes    Vitamin B12? sublingual daily    Incisions:  Drainage? clean and dry  Comment: still has band-aids and will remove those today.    Intake/Output:  Fluid Intake(oz/day)? 60-80 oz  Fluid type? 2 protein drinks and 2-3 bottles of water    Heartburn? No  Counseling: Call if reflux, or pain with eating and drinking start  Nausea? No  Vomiting? No  BM? Yes  Gas? Yes    Voiding Frequency? 4 or more/day     Are you using your incentive spirometer? If yes, how often? 3+/day    Any fever type symptoms? No    Walking activity? Yes  Frequency/Type: walking    In Preparation for Surgery:    Is there anything you would have changed about your preparation for surgery from our clinic? If yes, what? Everything was covered    On a scale of 1-5, with 5 being the highest, how was the service while you were in the hospital? 5    Is/was there anyone in particular; nurse, aide, hospital staff, that did a great job and you would like us to recognize? If yes, whom. Nurses and Pearl were great!    Reminder of plan and expectations for follow-up visits given to the patient.    Thank you for your time. Please do not hesitate to call us with any questions or concerns.    Call completed by: Cindi Jamison RN

## 2024-11-04 NOTE — PROGRESS NOTES
"  [facility]: Transitions of Care Outreach  Chief Complaint   Patient presents with    Clinic Care Coordination - Post Hospital       Most Recent Admission Date: 10/31/2024   Most Recent Admission Diagnosis: Morbid obesity with BMI of 50.0-59.9, adult (H) - E66.01, Z68.43     Most Recent Discharge Date: 11/1/2024   Most Recent Discharge Diagnosis: Primary hypertension - I10  Hypertensive urgency - I16.0  Morbid (severe) obesity due to excess calories (H) - E66.01  S/P biliopancreatic diversion with duodenal switch - Z98.84     Transitions of Care Assessment    Discharge Assessment  How are you doing now that you are home?: \" I am doing pretty good \"  How are your symptoms? (Red Flag symptoms escalate to triage hotline per guidelines): Improved  Do you know how to contact your clinic care team if you have future questions or changes to your health status? : Yes  Does the patient have their discharge instructions? : Yes  Does the patient have questions regarding their discharge instructions? : No  Were you started on any new medications or were there changes to any of your previous medications? : Yes  Does the patient have all of their medications?: Yes  Do you have questions regarding any of your medications? : No  Do you have all of your needed medical supplies or equipment (DME)?  (i.e. oxygen tank, CPAP, cane, etc.): Yes    Post-op ([facility] CTA Only)  If the patient had a surgery or procedure, do they have any questions for a nurse?: No         [facility] Explained and offered Care Coordination support to eligible patients: Yes    Patient accepted? No    Follow up Plan     Discharge Follow-Up  Discharge follow up appointment scheduled in alignment with recommended follow up timeframe or Transitions of Risk Category? (Low = within 30 days; Moderate= within 14 days; High= within 7 days): Yes  Discharge Follow Up Appointment Date: 11/12/24  Discharge Follow Up Appointment Scheduled with?: Specialty Care " Provider    Future Appointments   Date Time Provider Department Center   11/5/2024  1:00 PM TANISHA POST-OP CLASS [facility]   11/12/2024  1:15 PM [facility]le [facility]   12/20/2024  4:00 PM Sus[facility] Lemus RD [facility]   1/30/2025  8:30 AM Susana Lemus RD [facility]   1/30/2025  9:00 AM Inocencio Campa, PhD [facility]   4/30/2025  8:00 AM Laurie Moreno MD [facility]   7/30/2025  8:00 AM Sus[facility] Lemus RD [facility]   10/30/2025  8:00 AM [facility]       Outpatient Plan as outlined on AVS reviewed with patient.    For any urgent concerns, please contact our 24 hour nurse triage line: [phone] ([phone])       BRIAN Watkins

## 2024-11-05 ENCOUNTER — TELEPHONE (OUTPATIENT)
Dept: SURGERY | Facility: CLINIC | Age: 39
End: 2024-11-05

## 2024-11-06 ENCOUNTER — VIRTUAL VISIT (OUTPATIENT)
Dept: SURGERY | Facility: CLINIC | Age: 39
End: 2024-11-06
Payer: COMMERCIAL

## 2024-11-06 ENCOUNTER — PATIENT OUTREACH (OUTPATIENT)
Dept: CARE COORDINATION | Facility: CLINIC | Age: 39
End: 2024-11-06

## 2024-11-06 DIAGNOSIS — Z98.84 BARIATRIC SURGERY STATUS: Primary | ICD-10-CM

## 2024-11-06 NOTE — LETTER
11/6/2024      Dk Akins  [address] [place] [id number]      Dear Colleague,    Thank you for referring your patient, Dk Akins, to the [facility] AND BAR[facility]ATRICS CARE [place]. Please see a copy of my visit note below.    Time in: 1:00 pm  /Time out: 1:30 pm    Pt presents for 1 week post op dietitian follow up. Educated pt on pureed and soft to bariatric regular diets. Provided grocery list and sample meal plan for each diet stage.  Pt will begin pureed diet on 11/8/2024 and advance as tolerated to softs/bariatric regular on 11/22/2024. Instructed pt to begin 500 mg calcium citrate BID and 5000IU Vitamin D3 3 weeks post op. Pt to follow up with RD at 3 months post op.     Tanika Miner RD       Again, thank you for allowing me to participate in the care of your patient.        Sincerely,        Tanika Miner RD

## 2024-11-06 NOTE — PROGRESS NOTES
Time in: 1:00 pm  /Time out: 1:30 pm    Pt presents for 1 week post op dietitian follow up. Educated pt on pureed and soft to bariatric regular diets. Provided grocery list and sample meal plan for each diet stage.  Pt will begin pureed diet on 11/8/2024 and advance as tolerated to softs/bariatric regular on 11/22/2024. Instructed pt to begin 500 mg calcium citrate BID and 5000IU Vitamin D3 3 weeks post op. Pt to follow up with RD at 3 months post op.     Tanika Miner RD

## 2024-11-12 ENCOUNTER — VIRTUAL VISIT (OUTPATIENT)
Dept: SURGERY | Facility: CLINIC | Age: 39
End: 2024-11-12
Payer: COMMERCIAL

## 2024-11-12 VITALS — HEIGHT: 72 IN | WEIGHT: 315 LBS | BODY MASS INDEX: 42.66 KG/M2

## 2024-11-12 DIAGNOSIS — Z98.890 POST-OPERATIVE STATE: Primary | ICD-10-CM

## 2024-11-12 PROCEDURE — 99024 POSTOP FOLLOW-UP VISIT: CPT | Mod: 95 | Performed by: NURSE PRACTITIONER

## 2024-11-12 NOTE — LETTER
11/12/2024      Dk Akins  [address] [place] [id number]      Dear Colleague,    Thank you for referring your patient, Dk Akins, to the [facility]ERY [facility] AND BARIATRICS CARE [place]. Please see a copy of my visit note below.    HPI: Pt is here for follow up of a laparoscopic sleeve gastrectomy with single anastomosis duodenal switch . He is doing well. Taking po well, estimating he is getting in 60+ oz of fluid. No vomiting.  No pain with drinking or eating purees. No fevers or chills. Ambulating without problems.     Current Outpatient Medications   Medication Sig Dispense Refill     atenolol (TENORMIN) 50 MG tablet Take 1 tablet (50 mg) by mouth daily for 14 days. Then restart your atenolol-chlorthalidone combination 14 tablet 0     atenolol-chlorthalidone (TENORETIC) 50-25 MG tablet Take 1 tablet by mouth daily. Restart 2 weeks after surgery       childrens multivitamin chewable tablet Take 1 tablet by mouth daily. Multivitamin must contain Vitamin A, Zinc and at least 18 mg of iron.       cyanocobalamin (VITAMIN B-12) 1000 MCG sublingual tablet Place 1 tablet (1,000 mcg) under the tongue daily. 90 tablet 3     hyoscyamine (LEVSIN/SL) 0.125 MG sublingual tablet Place 1 tablet (125 mcg) under the tongue every 4 hours as needed (esophageal spasm/upper abdominal cramping). 20 tablet 0     omeprazole (PRILOSEC) 20 MG DR capsule Take 1 capsule (20 mg) by mouth daily. Start day after surgery, open contents and sprinkle on food for first 6 weeks. Take daily for 3 months after surgery. 90 capsule 0     traMADol 100 MG TABS Take 100 mg by mouth every 6 hours as needed for severe pain. 10 tablet 0     [START ON 11/14/2024] ursodiol (ACTIGALL) 300 MG capsule Take 1 capsule (300 mg) by mouth 2 times daily. Begin two weeks after surgery. Take with warm water and swallow whole, do NOT open capsules.  Take for 6 months post-op 180 capsule 1         There were no vitals taken for this  visit.  Wt Readings from Last 4 Encounters:   10/31/24 (!) 182.4 kg (402 lb 3.2 oz)   10/11/24 (!) 185 kg (407 lb 12.8 oz)   10/09/24 (!) 183.7 kg (405 lb)   09/03/24 (!) 181.5 kg (400 lb 3.2 oz)         There is no height or weight on file to calculate BMI.    EXAM:  Deferred; virtual visit      Assessment/Plan: Pt s/p laparoscopic sleeve gastrectomy with single anastomosis duodenal switch . Doing well. Diet and activity discussed. He will f/u with us at the Bariatric Center in 1 month to meet with our dietician, and again at 3 months for additional follow up.    MART Harrington CNP  [phone]  [facility] General and Bariatric Surgery     Dk Akins is 39 year old  male who presents for a billable video visit today.    How would you like to obtain your AVS? MyChart  If dropped from the video visit, the video invitation should be resent by: Text to cell phone: [phone]  Will anyone else be joining your video visit? No      Video Start Time:  1323      Video-Visit Details    Type of service:  Video Visit    Platform used for Video Visit: [employer]    Video End Time (time video stopped):  1332    Originating Location (pt. Location): Home      Distant Location (provider location):  On-site    Distant Location (provider location):  [facility] SURGERY CLINIC AND BARIATRICS CARE [place]      HPI: Pt is here for follow up of a laparoscopic sleeve gastrectomy with single anastomosis duodenal switch . He is doing well. Taking po well, estimating he is getting in 60+ oz of fluid. No vomiting.  No pain with drinking or eating purees. No fevers or chills. Ambulating without problems.     Current Outpatient Medications   Medication Sig Dispense Refill     atenolol (TENORMIN) 50 MG tablet Take 1 tablet (50 mg) by mouth daily for 14 days. Then restart your atenolol-chlorthalidone combination 14 tablet 0     atenolol-chlorthalidone (TENORETIC) 50-25 MG tablet Take 1 tablet by mouth daily. Restart 2  weeks after surgery       childrens multivitamin chewable tablet Take 1 tablet by mouth daily. Multivitamin must contain Vitamin A, Zinc and at least 18 mg of iron.       cyanocobalamin (VITAMIN B-12) 1000 MCG sublingual tablet Place 1 tablet (1,000 mcg) under the tongue daily. 90 tablet 3     hyoscyamine (LEVSIN/SL) 0.125 MG sublingual tablet Place 1 tablet (125 mcg) under the tongue every 4 hours as needed (esophageal spasm/upper abdominal cramping). 20 tablet 0     omeprazole (PRILOSEC) 20 MG DR capsule Take 1 capsule (20 mg) by mouth daily. Start day after surgery, open contents and sprinkle on food for first 6 weeks. Take daily for 3 months after surgery. 90 capsule 0     traMADol 100 MG TABS Take 100 mg by mouth every 6 hours as needed for severe pain. 10 tablet 0     [START ON 11/14/2024] ursodiol (ACTIGALL) 300 MG capsule Take 1 capsule (300 mg) by mouth 2 times daily. Begin two weeks after surgery. Take with warm water and swallow whole, do NOT open capsules.  Take for 6 months post-op 180 capsule 1         There were no vitals taken for this visit.  Wt Readings from Last 4 Encounters:   10/31/24 (!) 182.4 kg (402 lb 3.2 oz)   10/11/24 (!) 185 kg (407 lb 12.8 oz)   10/09/24 (!) 183.7 kg (405 lb)   09/03/24 (!) 181.5 kg (400 lb 3.2 oz)         There is no height or weight on file to calculate BMI.    EXAM:  Deferred; virtual visit      Assessment/Plan: Pt s/p laparoscopic sleeve gastrectomy with single anastomosis duodenal switch . Doing well. Diet and activity discussed. He will f/u with us at the Bariatric Center in 1 month to meet with our dietician, and again at 3 months for additional follow up.    MART Harrington CNP  [phone]  [facility] General and Bariatric Surgery       Again, thank you for allowing me to participate in the care of your patient.        Sincerely,        MART Crawford CNP

## 2024-11-12 NOTE — PROGRESS NOTES
HPI: Pt is here for follow up of a laparoscopic sleeve gastrectomy with single anastomosis duodenal switch . He is doing well. Taking po well, estimating he is getting in 60+ oz of fluid. No vomiting.  No pain with drinking or eating purees. No fevers or chills. Ambulating without problems.     Current Outpatient Medications   Medication Sig Dispense Refill    atenolol (TENORMIN) 50 MG tablet Take 1 tablet (50 mg) by mouth daily for 14 days. Then restart your atenolol-chlorthalidone combination 14 tablet 0    atenolol-chlorthalidone (TENORETIC) 50-25 MG tablet Take 1 tablet by mouth daily. Restart 2 weeks after surgery      childrens multivitamin chewable tablet Take 1 tablet by mouth daily. Multivitamin must contain Vitamin A, Zinc and at least 18 mg of iron.      cyanocobalamin (VITAMIN B-12) 1000 MCG sublingual tablet Place 1 tablet (1,000 mcg) under the tongue daily. 90 tablet 3    hyoscyamine (LEVSIN/SL) 0.125 MG sublingual tablet Place 1 tablet (125 mcg) under the tongue every 4 hours as needed (esophageal spasm/upper abdominal cramping). 20 tablet 0    omeprazole (PRILOSEC) 20 MG DR capsule Take 1 capsule (20 mg) by mouth daily. Start day after surgery, open contents and sprinkle on food for first 6 weeks. Take daily for 3 months after surgery. 90 capsule 0    traMADol 100 MG TABS Take 100 mg by mouth every 6 hours as needed for severe pain. 10 tablet 0    [START ON 11/14/2024] ursodiol (ACTIGALL) 300 MG capsule Take 1 capsule (300 mg) by mouth 2 times daily. Begin two weeks after surgery. Take with warm water and swallow whole, do NOT open capsules.  Take for 6 months post-op 180 capsule 1         There were no vitals taken for this visit.  Wt Readings from Last 4 Encounters:   10/31/24 (!) 182.4 kg (402 lb 3.2 oz)   10/11/24 (!) 185 kg (407 lb 12.8 oz)   10/09/24 (!) 183.7 kg (405 lb)   09/03/24 (!) 181.5 kg (400 lb 3.2 oz)         There is no height or weight on file to calculate BMI.    EXAM:  Deferred;  virtual visit      Assessment/Plan: Pt s/p laparoscopic sleeve gastrectomy with single anastomosis duodenal switch . Doing well. Diet and activity discussed. He will f/u with us at the Bariatric Center in 1 month to meet with our dietician, and again at 3 months for additional follow up.    MART Harrington CNP  [phone]  [facility] General and Bariatric Surgery

## 2024-11-12 NOTE — PROGRESS NOTES
Dk Akins is 39 year old  male who presents for a billable video visit today.    How would you like to obtain your AVS? MyChart  If dropped from the video visit, the video invitation should be resent by: Text to cell phone: [phone]  Will anyone else be joining your video visit? No      Video Start Time:  1323      Video-Visit Details    Type of service:  Video Visit    Platform used for Video Visit: [employer]    Video End Time (time video stopped):  1332    Originating Location (pt. Location): Home      Distant Location (provider location):  On-site    Distant Location (provider location):  [facility] SURGERY CLINIC AND BARIATRICS CARE [place]      HPI: Pt is here for follow up of a laparoscopic sleeve gastrectomy with single anastomosis duodenal switch . He is doing well. Taking po well, estimating he is getting in 60+ oz of fluid. No vomiting.  No pain with drinking or eating purees. No fevers or chills. Ambulating without problems.     Current Outpatient Medications   Medication Sig Dispense Refill    atenolol (TENORMIN) 50 MG tablet Take 1 tablet (50 mg) by mouth daily for 14 days. Then restart your atenolol-chlorthalidone combination 14 tablet 0    atenolol-chlorthalidone (TENORETIC) 50-25 MG tablet Take 1 tablet by mouth daily. Restart 2 weeks after surgery      childrens multivitamin chewable tablet Take 1 tablet by mouth daily. Multivitamin must contain Vitamin A, Zinc and at least 18 mg of iron.      cyanocobalamin (VITAMIN B-12) 1000 MCG sublingual tablet Place 1 tablet (1,000 mcg) under the tongue daily. 90 tablet 3    hyoscyamine (LEVSIN/SL) 0.125 MG sublingual tablet Place 1 tablet (125 mcg) under the tongue every 4 hours as needed (esophageal spasm/upper abdominal cramping). 20 tablet 0    omeprazole (PRILOSEC) 20 MG DR capsule Take 1 capsule (20 mg) by mouth daily. Start day after surgery, open contents and sprinkle on food for first 6 weeks. Take daily for 3 months after surgery.  90 capsule 0    traMADol 100 MG TABS Take 100 mg by mouth every 6 hours as needed for severe pain. 10 tablet 0    [START ON 11/14/2024] ursodiol (ACTIGALL) 300 MG capsule Take 1 capsule (300 mg) by mouth 2 times daily. Begin two weeks after surgery. Take with warm water and swallow whole, do NOT open capsules.  Take for 6 months post-op 180 capsule 1         There were no vitals taken for this visit.  Wt Readings from Last 4 Encounters:   10/31/24 (!) 182.4 kg (402 lb 3.2 oz)   10/11/24 (!) 185 kg (407 lb 12.8 oz)   10/09/24 (!) 183.7 kg (405 lb)   09/03/24 (!) 181.5 kg (400 lb 3.2 oz)         There is no height or weight on file to calculate BMI.    EXAM:  Deferred; virtual visit      Assessment/Plan: Pt s/p laparoscopic sleeve gastrectomy with single anastomosis duodenal switch . Doing well. Diet and activity discussed. He will f/u with us at the Bariatric Center in 1 month to meet with our dietician, and again at 3 months for additional follow up.    MART Harrington CNP  [phone]  [facility]ric Surgery

## 2025-01-30 ENCOUNTER — VIRTUAL VISIT (OUTPATIENT)
Dept: SURGERY | Facility: CLINIC | Age: 40
End: 2025-01-30
Payer: COMMERCIAL

## 2025-01-30 DIAGNOSIS — E66.01 MORBID OBESITY (H): Primary | ICD-10-CM

## 2025-01-30 NOTE — PROGRESS NOTES
Virtual Visit Details    Type of service:  Video Visit     Originating Location (pt. Location): Other work    Distant Location (provider location):  Off-site  Platform used for Video Visit: Zoom (Telehealth)    Start Time: 9 AM  End Time: 9:25 AM    Nadir is 3 months s/p DOMINIQUE with Dr. Rolle. He has had a positive outcome up to this point, although has not been happy with his new work location. He was encouraged to come back to support group and meet with this clinician as needed. He has not struggled with his sobriety as well. F50.9; alcohol use disorder in full sustained remission; E66.01

## 2025-03-31 ENCOUNTER — MYC MEDICAL ADVICE (OUTPATIENT)
Dept: SURGERY | Facility: CLINIC | Age: 40
End: 2025-03-31
Payer: COMMERCIAL

## 2025-03-31 DIAGNOSIS — E78.5 DYSLIPIDEMIA: ICD-10-CM

## 2025-03-31 DIAGNOSIS — K90.9 INTESTINAL MALABSORPTION: ICD-10-CM

## 2025-03-31 DIAGNOSIS — Z98.84 S/P BILIOPANCREATIC DIVERSION WITH DUODENAL SWITCH: Primary | ICD-10-CM

## 2025-03-31 NOTE — TELEPHONE ENCOUNTER
6 months post-op Single Anastomosis Duodenal Switch lab orders needed and will be done at a [facility] lab  in preparation for appointment with Laurie Moreno MD on 4/30/2025.     Cindi Jamison RN, CBN   [facility]

## 2025-04-30 ENCOUNTER — VIRTUAL VISIT (OUTPATIENT)
Dept: SURGERY | Facility: CLINIC | Age: 40
End: 2025-04-30
Payer: COMMERCIAL

## 2025-04-30 VITALS — WEIGHT: 298 LBS | HEIGHT: 72 IN | BODY MASS INDEX: 40.36 KG/M2

## 2025-04-30 DIAGNOSIS — Z88.6 NSAID SENSITIVITY: ICD-10-CM

## 2025-04-30 DIAGNOSIS — K91.2 POSTOPERATIVE MALABSORPTION: Primary | ICD-10-CM

## 2025-04-30 DIAGNOSIS — E66.01 MORBID (SEVERE) OBESITY DUE TO EXCESS CALORIES (H): ICD-10-CM

## 2025-04-30 DIAGNOSIS — I10 PRIMARY HYPERTENSION: ICD-10-CM

## 2025-04-30 DIAGNOSIS — Z98.84 S/P BILIOPANCREATIC DIVERSION WITH DUODENAL SWITCH: ICD-10-CM

## 2025-04-30 DIAGNOSIS — K21.9 GASTROESOPHAGEAL REFLUX DISEASE WITHOUT ESOPHAGITIS: ICD-10-CM

## 2025-04-30 PROCEDURE — 98006 SYNCH AUDIO-VIDEO EST MOD 30: CPT | Performed by: FAMILY MEDICINE

## 2025-04-30 RX ORDER — OMEPRAZOLE 20 MG/1
20 CAPSULE, DELAYED RELEASE ORAL DAILY
Qty: 90 CAPSULE | Refills: 3 | Status: SHIPPED | OUTPATIENT
Start: 2025-04-30

## 2025-04-30 RX ORDER — ATENOLOL 50 MG/1
50 TABLET ORAL DAILY
Qty: 90 TABLET | Refills: 3 | Status: SHIPPED | OUTPATIENT
Start: 2025-04-30

## 2025-04-30 RX ORDER — CHOLECALCIFEROL (VITAMIN D3) 125 MCG
1 CAPSULE ORAL DAILY
Qty: 90 CAPSULE | Refills: 3 | Status: SHIPPED | OUTPATIENT
Start: 2025-04-30

## 2025-04-30 NOTE — PROGRESS NOTES
Virtual Visit Details    Type of service:  Video Visit     Originating Location (pt. Location): Home    Distant Location (provider location):  On-site  Platform used for Video Visit: Shona

## 2025-04-30 NOTE — PROGRESS NOTES
Bariatric Follow Up Visit with a History of Previous Bariatric Surgery     Date of visit: 4/30/2025  Physician: Laurie Moreno MD, MD  Primary Care Provider:  Bridgette Peace   39 year old  male    Date of Surgery: 10/31/2023  Initial Weight: 415#  Initial BMI: 56.28  Today's Weight:   Wt Readings from Last 1 Encounters:   04/30/25 135.2 kg (298 lb)     Body mass index is 40.42 kg/m .      Assessment and Plan     Assessment: Dk is a 39 year old year old male who is 18 months s/p   DOMINIQUE  with Dr. Aquilino Akins feels as if he has achieved the goals he hoped to accomplish through bariatric surgery and weight loss.    Encounter Diagnoses   Name Primary?    Postoperative malabsorption Yes    Primary hypertension     Morbid (severe) obesity due to excess calories (H)     S/P biliopancreatic diversion with duodenal switch     NSAID sensitivity     Gastroesophageal reflux disease without esophagitis          Current Outpatient Medications:     atenolol (TENORMIN) 50 MG tablet, Take 1 tablet (50 mg) by mouth daily., Disp: 90 tablet, Rfl: 3    childrens multivitamin chewable tablet, Take 1 tablet by mouth daily. Multivitamin must contain Vitamin A, Zinc and at least 18 mg of iron., Disp: , Rfl:     cholecalciferol (VITAMIN D3 ULTRA STRENGTH) 125 MCG (5000 UT) CAPS, Take 1 capsule (5,000 Units) by mouth daily., Disp: 90 capsule, Rfl: 3    cyanocobalamin (VITAMIN B-12) 1000 MCG sublingual tablet, Place 1 tablet (1,000 mcg) under the tongue daily., Disp: 90 tablet, Rfl: 3    omeprazole (PRILOSEC) 20 MG DR capsule, Take 1 capsule (20 mg) by mouth daily., Disp: 90 capsule, Rfl: 3     Plan: Refill atenolol. Have your wife check your BP once in a while. If over 140/90 let your primary or us know. OK to stop ursodiol when out. Continue 2 MVIs with iron, B-12 and vitamin D. Agree with BiPap adjustment prn.   Annual labs ordered.    3 mo RD    Bariatric Surgery Review     Interim  "History/LifeChanges: Maintaining a 117# weight loss. Best shape of his life. Feels great. No GERD. Taking B-12 and MVI No D and one ursodiol.     Patient Concerns: Needs refills. Having a hard time getting in to his primary and not fond of the partner he has seen  Appetite (1-10): OK  GERD: no    Medication changes: no    Vitamin Intake:   B-12   SL   MVI  2/d   Vitamin D  none   Calcium   none     Other  Ursodiol one daily              LABS: ordered    Nausea no  Vomiting no  Constipation no  Diarrhea no  Rashes no  Hair Loss no  Calf tenderness no  Breathing difficulty no  Reactive Hypoglycemia no  Light Headedness no   Moods euthymic    12 point ROS as above and otherwise negative      Habits:  Alcohol: sober  Tobacco: no  Caffeine no  NSAIDS no  Exercise Routine: [id number] steps, physical job  3 meals/day yes with 50gm protein drink in the am, jerkey for lunch, cod or other fish,   Protein first yes  Estimates 100-120gm grams/day  Water Separate from meals yes  Calorie Containing Beverages I love apple juice but it gives me bowel problems  Restaurant eating/wk none  Sleeping with Bipap-\"like a baby\"  Stress low/mod  CPAP: Bipap  Contraception: NA  DEXA:NA    Social History     Social History     Socioeconomic History    Marital status:      Spouse name: Not on file    Number of children: Not on file    Years of education: Not on file    Highest education level: Not on file   Occupational History    Not on file   Tobacco Use    Smoking status: Former     Types: Cigars    Smokeless tobacco: Never    Tobacco comments:     marijuana, not tobacco   Substance and Sexual Activity    Alcohol use: Not Currently     Comment: Sober since 03/17/2023    Drug use: Not Currently     Types: Marijuana    Sexual activity: Not on file   Other Topics Concern    Not on file   Social History Narrative    . Lives in [place]. Works FT for [employer]. Son born December 2023     Social Drivers of Health     Financial " Resource Strain: Not on file   Food Insecurity: Not on file   Transportation Needs: Not on file   Physical Activity: Not on file   Stress: Not on file   Social Connections: Not on file   Interpersonal Safety: Low Risk  (10/31/2024)    Interpersonal Safety     Do you feel physically and emotionally safe where you currently live?: Yes     Within the past 12 months, have you been hit, slapped, kicked or otherwise physically hurt by someone?: No     Within the past 12 months, have you been humiliated or emotionally abused in other ways by your partner or ex-partner?: No   Housing Stability: Not on file       Past Medical History     Past Medical History:   Diagnosis Date    Anxiety     Bone cyst     Dyslipidemia     Femur fracture (H)     x4    History of alcohol use disorder     Quit March 2023    History of tobacco use     18-27    Humerus fracture 01/01/2000    Hypertension     Knee pain     Metabolic syndrome     Morbid obesity (H)     LATOYA (obstructive sleep apnea)     Uses Bipap    Primary hypertension      Problem List     Patient Active Problem List   Diagnosis    Closed Fracture Of The Patella    Bone cyst    Primary hypertension    Morbid obesity (H)    Obstructive sleep apnea syndrome    Alcohol consumption binge drinking    Alcohol dependence with unspecified alcohol-induced disorder (H)    Dyslipidemia    Metabolic syndrome    History of alcohol use disorder    Knee pain    Morbid (severe) obesity due to excess calories (H)     Medications       Current Outpatient Medications:     atenolol (TENORMIN) 50 MG tablet, Take 1 tablet (50 mg) by mouth daily., Disp: 90 tablet, Rfl: 3    childrens multivitamin chewable tablet, Take 1 tablet by mouth daily. Multivitamin must contain Vitamin A, Zinc and at least 18 mg of iron., Disp: , Rfl:     cholecalciferol (VITAMIN D3 ULTRA STRENGTH) 125 MCG (5000 UT) CAPS, Take 1 capsule (5,000 Units) by mouth daily., Disp: 90 capsule, Rfl: 3    cyanocobalamin (VITAMIN B-12) 1000  MCG sublingual tablet, Place 1 tablet (1,000 mcg) under the tongue daily., Disp: 90 tablet, Rfl: 3    omeprazole (PRILOSEC) 20 MG DR capsule, Take 1 capsule (20 mg) by mouth daily., Disp: 90 capsule, Rfl: 3   Surgical History     Past Surgical History  He has a past surgical history that includes OPEN RX BIMALLEOLAR FX; knee surgery; and Diversion, Biliopancreatic, Robot-Assisted, Laparoscopic, With Duodenal Switch, Using Da Jacek Xi (N/A, 10/31/2024).    Objective-Exam     Constitutional:  Ht 1.829 m (6')   Wt 135.2 kg (298 lb)   BMI 40.42 kg/m      General:  Pleasant and in no acute distress   Neck:  Supple,   Respiratory: Normal respiratory effort, no cough, wheezes or crackles  Musculoskeletal: muscle mass WNL  Skin: color pink hair baseline, incisions nicely healed  Neurological: No tremor, normal gait  Psychiatric: alert and oriented X3, mood and affect normal    Counseling     We reviewed the important post op bariatric recommendations:  -eating 3 meals daily  -eating protein first, getting >60gm protein daily  -eating slowly, chewing food well  -avoiding/limiting calorie containing beverages  -drinking water 15-30 minutes before or after meals  -choosing wheat, not white with breads, crackers, pastas, moni, bagels, tortillas, rice  -limiting restaurant or cafeteria eating to twice a week or less    We discussed the importance of restorative sleep and stress management in maintaining a healthy weight.  We discussed the National Weight Control Registry healthy weight maintenance strategies and ways to optimize metabolism.  We discussed the importance of physical activity including cardiovascular and strength training in maintaining a healthier weight.    We discussed the importance of life-long vitamin supplementation and life-long  follow-up.    Dk was reminded that, to avoid marginal ulcers he should avoid tobacco at all, alcohol in excess, caffeine in excess, and NSAIDS (unless indicated for  cardioprotection or othewise and opposed by a PPI).    Laurie Moreno MD, MD, [facility].  4/30/2025  8:05 AM      No images are attached to the encounter.  Total time spent on the date of this encounter doing: chart review, review of test results, patient visit, physical exam, education, counseling, developing plan of care, and documenting = 30 minutes.

## 2025-04-30 NOTE — PATIENT INSTRUCTIONS
[facility]  Nutritional Guidelines  Duodenal Switch 6 Months Post Op    General Guidelines and Helpful Hints:  Eat 3 meals per day + protein supplement(s). No snacks between meals.  Do not skip meals.  This can cause overeating at the next meal and will prevent adequate protein and nutritional intake.  Aim for  grams of protein per day.   Always eat your protein first. This assists with optimal nutrition and helps you stay full longer.  To achieve daily protein goals, it is recommended to drink approved protein supplement between meals.  Follow appropriate portion size: Use measuring cups to be accurate.    Months Post Op Portion Size per Meal   6 months 3/4 cup   6-9 months 3/4 cup - 1 cup   9-12 months and beyond 1 cup maximum     Continue to use saucer/salad plates, infant/toddler silverware to keep portion sizes small and take small bites.  Eat S-L-O-W-L-Y to make each meal last 20-30 minutes. Always stop eating when satisfied.  Continue to use caution with foods containing skins, peels or membranes. Chew well!  Aim for 64 oz. of calorie-free fluids daily.  Continue to avoid caffeine, carbonation and alcohol.  Remember to avoid drinking during meals, 15-30 minutes before and 30 minutes after.  Aim for 30-60 minutes of physical activity most days of the week.  If having trouble tolerating meat, try using a crock-pot, tinfoil tent, steamer or other moist cooking method to create tender meats. Add broth or low-fat gravy to help meat stay moist.   Avoid high sugar and high fat foods to prevent high calorie intake. This will reduce your rate of weight loss. High fat items can also cause upset stomach and diarrhea.   Check nutrition labels for less than 10 grams of sugar and less than 10 grams of fat per serving.  Continue Taking Vitamins/Minerals:  3886-7788 mcg of Sublingual B-12 daily  1 Multivitamin with Iron twice daily (chewable or swallow tabs)  500-600 mg Calcium Citrate twice daily  (chewable or swallow tabs)  5000 IU Vitamin D3 daily  ADEK vitamins, three daily    Sample Grocery List    Protein:  Fat free Greek or light yogurt (less than 10 grams sugar)  Fat free or low-fat cottage cheese  String cheese or reduced fat cheese slices  Tuna, salmon, crab, egg, or chicken salad made with light or fat free mayonnaise  Egg or Egg Substitute  Lean/extra lean turkey, beef, bison, venison (ground, sirloin, round, flank)  Pork loin or tenderloin (grilled, baked, broiled)  Fish such as salmon, tuna, trout, tilapia, etc. (grilled, baked, broiled)  Tender cuts of lean (skinless) turkey or chicken  Lean deli meats: turkey, lean ham, chicken, lean roast beef  Beans such as kidney, garbanzo, black, price, or low-fat/fat free refried beans  Peanut butter (natural preferred). Limit to 1 Tbsp. per day.  Low-fat meatloaf (made with lean ground beef or turkey)  Sloppy Joes made with low-sugar ketchup and lean ground beef or turkey  Soy or vegetable protein (i.e. vegan crumbles, soy/veggie burger, tofu)  Hummus    Vegetables:  Fresh: cooked or raw (as tolerated)  Frozen vegetables  Canned vegetables (low sodium or no salt added, rinse before cooking/eating)  (Ok to have skins/peels/membranes/seeds - just chew well)    Fruits:  Fresh fruit  Frozen fruit (no sugar added)  Canned fruit (packed in its own juice, NOT syrup)  (Ok to have skins/peels/membranes/seeds - just chew well)    Starch:  Unsweetened whole-grain hot cereal (or high fiber cold cereal, dry)  Toasted whole wheat bread or [place] Thins  Whole grain crackers  Baked/boiled/mashed potato/sweet potato  Cooked whole grain pasta, brown rice, or other cooked whole grains  Starchy vegetables: corn, peas, winter squash    Protein Supplement:   Ready to drink protein shake with:  15-30 grams protein per serving  Less than 10 grams total carbohydrate per serving   Protein powder mixed with:   Skim or 1% milk  Low fat or fat free Lactaid milk, plain or no sugar  added soymilk  Water     Fats: (use sparingly)  1 teaspoon of soft tub margarine  1 teaspoon olive oil, canola oil, or peanut oil  1 tablespoon of low-fat anguiano or salad dressing     Sample Menu for 6 months after Duodenal Switch  Menu portions total   cup (12 Tbsp.) per meal.  You may gradually increase to 1 cup (16 Tbsp.) per meal by 9 months post op.    You do NOT need to eat/drink the full portion sizes listed below  Always stop when you are satisfied    Breakfast   cup 1% cottage cheese     cup peaches    Lunch 2-3 oz. lean hamburger or veggie burger  1-2 tsp. salsa   Supplement Approved Protein Shake   (Have between meals throughout the day)   Dinner 2 oz chicken breast    cup green beans       Breakfast 3 eggs or   cup egg substitute    Lunch   cup low-fat Sloppy Femi mixture    Supplement Approved Protein Shake   (Have between meals throughout the day)   Dinner 2 oz grilled, broiled, or baked lemon pepper salmon    cup asparagus       Breakfast 6 Tbsp. light Greek yogurt with 2 Tbsp. Grape Nuts or high fiber cereal    cup blueberries    Lunch   cup of chili made with extra lean ground beef and kidney beans    cup salad with 1 tsp. fat free salad dressing   Dinner 2 oz pork loin made in a crock pot  2 Tbsp. steamed broccoli  2 Tbsp. baked potato with 1-2 spritzes of spray margarine    Supplement Approved Protein Shake   (Have between meals throughout the day)     Breakfast 2 oz. lean ham    cup seedless melon   Lunch 3 oz diced turkey with 1 tsp. low fat gravy   Dinner   cup extra lean ground beef mixed with 1 Tbsp. spaghetti sauce  3 Tbsp. whole wheat pasta   Supplement Approved Protein Shake   (Have between meals throughout the day)     Breakfast 3 oz turkey or soy based sausage jaleel    Lunch   cup low-fat cottage cheese    cup pineapple   Supplement Approved Protein Shake   (Have between meals throughout the day)   Dinner 2 oz beef tenderloin    cup asparagus     Breakfast 2 string cheese    of a whole  wheat English Muffin (toasted)  1 Tbsp. natural peanut butter   Lunch   cup of black bean soup    Dinner 2 oz low fat turkey meat loaf     cup cooked carrots   Supplement Approved Protein Shake   (Have between meals throughout the day)     Breakfast   cup scrambled egg or egg substitute  1 Tbsp. finely chopped bell pepper  3 Tbsp. low fat shredded cheese   Lunch 3 oz. diced lean ham   Supplement Approved Protein Shake   (Have between meals throughout the day)   Dinner 2 oz  broiled fish     cup mashed sweet potato

## 2025-04-30 NOTE — LETTER
4/30/2025      Dk Akins  [address] [place] [id number]      Dear Colleague,    Thank you for referring your patient, Dk Akins, to the [facility] [place]. Please see a copy of my visit note below.    Bariatric Follow Up Visit with a History of Previous Bariatric Surgery     Date of visit: 4/30/2025  Physician: Laurie Moreno MD, MD  Primary Care Provider:  Bridgette Peace  Dk Akins   39 year old  male    Date of Surgery: 10/31/2023  Initial Weight: 415#  Initial BMI: 56.28  Today's Weight:   Wt Readings from Last 1 Encounters:   04/30/25 135.2 kg (298 lb)     Body mass index is 40.42 kg/m .      Assessment and Plan     Assessment: Dk is a 39 year old year old male who is 18 months s/p   DOMINIQUE  with Dr. Rolle  Dk Akins feels as if he has achieved the goals he hoped to accomplish through bariatric surgery and weight loss.    Encounter Diagnoses   Name Primary?     Postoperative malabsorption Yes     Primary hypertension      Morbid (severe) obesity due to excess calories (H)      S/P biliopancreatic diversion with duodenal switch      NSAID sensitivity      Gastroesophageal reflux disease without esophagitis          Current Outpatient Medications:      atenolol (TENORMIN) 50 MG tablet, Take 1 tablet (50 mg) by mouth daily., Disp: 90 tablet, Rfl: 3     childrens multivitamin chewable tablet, Take 1 tablet by mouth daily. Multivitamin must contain Vitamin A, Zinc and at least 18 mg of iron., Disp: , Rfl:      cholecalciferol (VITAMIN D3 ULTRA STRENGTH) 125 MCG (5000 UT) CAPS, Take 1 capsule (5,000 Units) by mouth daily., Disp: 90 capsule, Rfl: 3     cyanocobalamin (VITAMIN B-12) 1000 MCG sublingual tablet, Place 1 tablet (1,000 mcg) under the tongue daily., Disp: 90 tablet, Rfl: 3     omeprazole (PRILOSEC) 20 MG DR capsule, Take 1 capsule (20 mg) by mouth daily., Disp: 90 capsule, Rfl: 3     Plan: Refill atenolol. Have  "your wife check your BP once in a while. If over 140/90 let your primary or us know. OK to stop ursodiol when out. Continue 2 MVIs with iron, B-12 and vitamin D. Agree with BiPap adjustment prn.   Annual labs ordered.    3 mo RD    Bariatric Surgery Review     Interim History/LifeChanges: Maintaining a 117# weight loss. Best shape of his life. Feels great. No GERD. Taking B-12 and MVI No D and one ursodiol.     Patient Concerns: Needs refills. Having a hard time getting in to his primary and not fond of the partner he has seen  Appetite (1-10): OK  GERD: no    Medication changes: no    Vitamin Intake:   B-12   SL   MVI  2/d   Vitamin D  none   Calcium   none     Other  Ursodiol one daily              LABS: ordered    Nausea no  Vomiting no  Constipation no  Diarrhea no  Rashes no  Hair Loss no  Calf tenderness no  Breathing difficulty no  Reactive Hypoglycemia no  Light Headedness no   Moods euthymic    12 point ROS as above and otherwise negative      Habits:  Alcohol: sober  Tobacco: no  Caffeine no  NSAIDS no  Exercise Routine: [id number] steps, physical job  3 meals/day yes with 50gm protein drink in the am, jerkey for lunch, cod or other fish,   Protein first yes  Estimates 100-120gm grams/day  Water Separate from meals yes  Calorie Containing Beverages I love apple juice but it gives me bowel problems  Restaurant eating/wk none  Sleeping with Bipap-\"like a baby\"  Stress low/mod  CPAP: Bipap  Contraception: NA  DEXA:NA    Social History     Social History     Socioeconomic History     Marital status:      Spouse name: Not on file     Number of children: Not on file     Years of education: Not on file     Highest education level: Not on file   Occupational History     Not on file   Tobacco Use     Smoking status: Former     Types: Cigars     Smokeless tobacco: Never     Tobacco comments:     marijuana, not tobacco   Substance and Sexual Activity     Alcohol use: Not Currently     Comment: Sober since " 03/17/2023     Drug use: Not Currently     Types: Marijuana     Sexual activity: Not on file   Other Topics Concern     Not on file   Social History Narrative    . Lives in [place]. Works FT for [employer]. Son born December 2023     Social Drivers of Health     Financial Resource Strain: Not on file   Food Insecurity: Not on file   Transportation Needs: Not on file   Physical Activity: Not on file   Stress: Not on file   Social Connections: Not on file   Interpersonal Safety: Low Risk  (10/31/2024)    Interpersonal Safety      Do you feel physically and emotionally safe where you currently live?: Yes      Within the past 12 months, have you been hit, slapped, kicked or otherwise physically hurt by someone?: No      Within the past 12 months, have you been humiliated or emotionally abused in other ways by your partner or ex-partner?: No   Housing Stability: Not on file       Past Medical History     Past Medical History:   Diagnosis Date     Anxiety      Bone cyst      Dyslipidemia      Femur fracture (H)     x4     History of alcohol use disorder     Quit March 2023     History of tobacco use     18-26     Humerus fracture 01/01/2000     Hypertension      Knee pain      Metabolic syndrome      Morbid obesity (H)      LATOYA (obstructive sleep apnea)     Uses Bipap     Primary hypertension      Problem List     Patient Active Problem List   Diagnosis     Closed Fracture Of The Patella     Bone cyst     Primary hypertension     Morbid obesity (H)     Obstructive sleep apnea syndrome     Alcohol consumption binge drinking     Alcohol dependence with unspecified alcohol-induced disorder (H)     Dyslipidemia     Metabolic syndrome     History of alcohol use disorder     Knee pain     Morbid (severe) obesity due to excess calories (H)     Medications       Current Outpatient Medications:      atenolol (TENORMIN) 50 MG tablet, Take 1 tablet (50 mg) by mouth daily., Disp: 90 tablet, Rfl: 3     childrens multivitamin  chewable tablet, Take 1 tablet by mouth daily. Multivitamin must contain Vitamin A, Zinc and at least 18 mg of iron., Disp: , Rfl:      cholecalciferol (VITAMIN D3 ULTRA STRENGTH) 125 MCG (5000 UT) CAPS, Take 1 capsule (5,000 Units) by mouth daily., Disp: 90 capsule, Rfl: 3     cyanocobalamin (VITAMIN B-12) 1000 MCG sublingual tablet, Place 1 tablet (1,000 mcg) under the tongue daily., Disp: 90 tablet, Rfl: 3     omeprazole (PRILOSEC) 20 MG DR capsule, Take 1 capsule (20 mg) by mouth daily., Disp: 90 capsule, Rfl: 3   Surgical History     Past Surgical History  He has a past surgical history that includes OPEN RX BIMALLEOLAR FX; knee surgery; and Diversion, Biliopancreatic, Robot-Assisted, Laparoscopic, With Duodenal Switch, Using Da Jacek Xi (N/A, 10/31/2024).    Objective-Exam     Constitutional:  Ht 1.829 m (6')   Wt 135.2 kg (298 lb)   BMI 40.42 kg/m      General:  Pleasant and in no acute distress   Neck:  Supple,   Respiratory: Normal respiratory effort, no cough, wheezes or crackles  Musculoskeletal: muscle mass WNL  Skin: color pink hair baseline, incisions nicely healed  Neurological: No tremor, normal gait  Psychiatric: alert and oriented X3, mood and affect normal    Counseling     We reviewed the important post op bariatric recommendations:  -eating 3 meals daily  -eating protein first, getting >60gm protein daily  -eating slowly, chewing food well  -avoiding/limiting calorie containing beverages  -drinking water 15-30 minutes before or after meals  -choosing wheat, not white with breads, crackers, pastas, moni, bagels, tortillas, rice  -limiting restaurant or cafeteria eating to twice a week or less    We discussed the importance of restorative sleep and stress management in maintaining a healthy weight.  We discussed the National Weight Control Registry healthy weight maintenance strategies and ways to optimize metabolism.  We discussed the importance of physical activity including cardiovascular and  strength training in maintaining a healthier weight.    We discussed the importance of life-long vitamin supplementation and life-long  follow-up.    Dk was reminded that, to avoid marginal ulcers he should avoid tobacco at all, alcohol in excess, caffeine in excess, and NSAIDS (unless indicated for cardioprotection or othewise and opposed by a PPI).    Laurie Moreno MD, MD, [facility].  4/30/2025  8:05 AM      No images are attached to the encounter.  Total time spent on the date of this encounter doing: chart review, review of test results, patient visit, physical exam, education, counseling, developing plan of care, and documenting = 30 minutes.    Virtual Visit Details    Type of service:  Video Visit     Originating Location (pt. Location): Home    Distant Location (provider location):  On-site  Platform used for Video Visit: Shona       Again, thank you for allowing me to participate in the care of your patient.        Sincerely,        Laurie Moreno MD    Electronically signed

## 2025-06-30 NOTE — PROGRESS NOTES
Dk Akins is status post excisional debridement and partial wound closure of a traumatic right buttock wound last week. He is doing well with mild pain at rest.  He is tolerating a regular diet, ambulating with a moderate amount of pain.  They have been faithfully doing sits baths and dressing changes, and think the wound is looking good.    EXAM:  There were no vitals taken for this visit.  GENERAL: Well developed male, No acute distress, pleasant and conversant   Wound: The superficial layer of sutures have pulled through, with the deep layer of sutures remaining intact.  Granulation tissue covers approximately 95% of the wound, with no purulence.  No surrounding erythema or induration of tissue.      ASSESSMENT AND PLAN:  Dk Akins is doing well postoperatively.  I would like them to continue with daily sits baths, as well as moist to dry dressing changes to help aid in healing by secondary intention.  He does note that his job, he is on his feet and walking frequently causes moderate degree of discomfort.  I think it would be worthwhile for him to remain off work for 1 more week to facilitate healing of this wound.  Next follow-up in 2 weeks if necessary.      Maxi Rolle MD  [phone]  [facility] Department of Surgery   SW/CM Discharge Plan  Informed patient is ready for discharge.  Patient to be picked up by family . Patient/interested person has been counseled for post hospitalization care.  Patient agrees and understands goals and plan. Initial implementation of the patient’s discharge plan has been arranged, including any devices/equipment needed for discharge. Discharge plan communicated to MD and RN.    Patient’s discharge disposition is Home or Self Care.    Selected Continued Care - Admitted Since 6/28/2025    No services have been selected for the patient.          .

## 2025-08-10 ENCOUNTER — HEALTH MAINTENANCE LETTER (OUTPATIENT)
Age: 40
End: 2025-08-10

## (undated) DEVICE — PREP CHLORAPREP 26ML TINTED HI-LITE ORANGE 930815

## (undated) DEVICE — GLOVE BIOGEL PI SZ 8.0 40880

## (undated) DEVICE — SUCTION MANIFOLD NEPTUNE 2 SYS 1 PORT 702-025-000

## (undated) DEVICE — SYR 50ML SLIP TIP W/O NDL 309654

## (undated) DEVICE — SU SILK 2-0 SH 30" K833H

## (undated) DEVICE — SYR 50ML CATH TIP W/O NDL 309620

## (undated) DEVICE — STPL DAVINCI SUREFORM 60MM STR 480460

## (undated) DEVICE — SUTURE VICRYL+ 2-0 27 VIO VCP317H

## (undated) DEVICE — DAVINCI XI HANDPIECE ESU VESSEL SEALER 8MM EXT 480422

## (undated) DEVICE — DECANTER VIAL 2006S

## (undated) DEVICE — Device

## (undated) DEVICE — DAVINCI XI SEAL UNIVERSAL 5-12MM 470500

## (undated) DEVICE — SU WND CLOSURE VLOC 180 ABS 3-0 6" V-20 VLOCL0604

## (undated) DEVICE — SUTURE VICRYL+ 2-0 27IN SH UND VCP417H

## (undated) DEVICE — STPL DAVINCI SUREFORM 60MM RELOAD WHITE 48360W

## (undated) DEVICE — SYR 30ML LL W/O NDL 302832

## (undated) DEVICE — TUBING SMOKE EVAC PNEUMOCLEAR HIGH FLOW 0620050250

## (undated) DEVICE — GLOVE BIOGEL PI ULTRATOUCH G SZ 6.5 42165

## (undated) DEVICE — SU SILK 0 SH 30" K834H

## (undated) DEVICE — DAVINCI XI OBTURATOR BLADELESS 8MM 470359

## (undated) DEVICE — DAVINCI XI DRAPE COLUMN 470341

## (undated) DEVICE — GOWN IMPERVIOUS BREATHABLE 2XL/XLONG

## (undated) DEVICE — SU VICRYL+ 4-0 UNDYED PS-2 VCP496ZH

## (undated) DEVICE — NDL INSUFFLATION 13GA 120MM C2201

## (undated) DEVICE — SU VICRYL+ 0 27 UR6 VLT VCP603H

## (undated) DEVICE — ANTIFOG SOLUTION SEE SHARP 150M TROCAR SWABS 30978 (COI)

## (undated) DEVICE — DAVINCI XI DRAPE ARM 470015

## (undated) DEVICE — TUBE RECTAL 32FR 30IN NS DC742

## (undated) DEVICE — DRAPE SHEET TABLE COVER KC 42301*

## (undated) DEVICE — STPL DAVINCI SUREFORM 60MM RELOAD BLUE 48360B

## (undated) DEVICE — DAVINCI XI REDUCER 8-12MM 470381

## (undated) DEVICE — LUBRICANT INST ELECTROLUBE EL101

## (undated) RX ORDER — PROPOFOL 10 MG/ML
INJECTION, EMULSION INTRAVENOUS
Status: DISPENSED
Start: 2024-10-31

## (undated) RX ORDER — BUPIVACAINE HYDROCHLORIDE 2.5 MG/ML
INJECTION, SOLUTION INFILTRATION; PERINEURAL
Status: DISPENSED
Start: 2024-10-31

## (undated) RX ORDER — DEXAMETHASONE SODIUM PHOSPHATE 10 MG/ML
INJECTION, SOLUTION INTRAMUSCULAR; INTRAVENOUS
Status: DISPENSED
Start: 2024-10-31

## (undated) RX ORDER — BUPIVACAINE HYDROCHLORIDE 2.5 MG/ML
INJECTION, SOLUTION EPIDURAL; INFILTRATION; INTRACAUDAL
Status: DISPENSED
Start: 2024-10-31

## (undated) RX ORDER — ONDANSETRON 2 MG/ML
INJECTION INTRAMUSCULAR; INTRAVENOUS
Status: DISPENSED
Start: 2024-10-31

## (undated) RX ORDER — INDOCYANINE GREEN AND WATER 25 MG
KIT INJECTION
Status: DISPENSED
Start: 2024-10-31

## (undated) RX ORDER — LIDOCAINE HYDROCHLORIDE 10 MG/ML
INJECTION, SOLUTION EPIDURAL; INFILTRATION; INTRACAUDAL; PERINEURAL
Status: DISPENSED
Start: 2024-10-31

## (undated) RX ORDER — FENTANYL CITRATE 50 UG/ML
INJECTION, SOLUTION INTRAMUSCULAR; INTRAVENOUS
Status: DISPENSED
Start: 2024-10-31